# Patient Record
Sex: FEMALE | Race: WHITE | NOT HISPANIC OR LATINO
[De-identification: names, ages, dates, MRNs, and addresses within clinical notes are randomized per-mention and may not be internally consistent; named-entity substitution may affect disease eponyms.]

---

## 2017-01-04 ENCOUNTER — APPOINTMENT (OUTPATIENT)
Dept: PAIN MANAGEMENT | Facility: CLINIC | Age: 62
End: 2017-01-04

## 2017-01-04 VITALS
DIASTOLIC BLOOD PRESSURE: 77 MMHG | HEIGHT: 64 IN | HEART RATE: 78 BPM | SYSTOLIC BLOOD PRESSURE: 123 MMHG | BODY MASS INDEX: 16.9 KG/M2 | WEIGHT: 99 LBS

## 2017-01-04 DIAGNOSIS — Z12.4 ENCOUNTER FOR SCREENING FOR MALIGNANT NEOPLASM OF CERVIX: ICD-10-CM

## 2017-02-07 ENCOUNTER — APPOINTMENT (OUTPATIENT)
Dept: PAIN MANAGEMENT | Facility: CLINIC | Age: 62
End: 2017-02-07

## 2017-02-07 VITALS
HEART RATE: 92 BPM | WEIGHT: 98 LBS | SYSTOLIC BLOOD PRESSURE: 131 MMHG | DIASTOLIC BLOOD PRESSURE: 83 MMHG | HEIGHT: 64 IN | BODY MASS INDEX: 16.73 KG/M2

## 2017-02-15 ENCOUNTER — FORM ENCOUNTER (OUTPATIENT)
Age: 62
End: 2017-02-15

## 2017-02-16 ENCOUNTER — APPOINTMENT (OUTPATIENT)
Dept: MAMMOGRAPHY | Facility: IMAGING CENTER | Age: 62
End: 2017-02-16

## 2017-02-16 ENCOUNTER — APPOINTMENT (OUTPATIENT)
Dept: ULTRASOUND IMAGING | Facility: IMAGING CENTER | Age: 62
End: 2017-02-16

## 2017-02-16 ENCOUNTER — OUTPATIENT (OUTPATIENT)
Dept: OUTPATIENT SERVICES | Facility: HOSPITAL | Age: 62
LOS: 1 days | End: 2017-02-16
Payer: COMMERCIAL

## 2017-02-16 DIAGNOSIS — Z00.8 ENCOUNTER FOR OTHER GENERAL EXAMINATION: ICD-10-CM

## 2017-02-16 PROCEDURE — 76641 ULTRASOUND BREAST COMPLETE: CPT

## 2017-02-16 PROCEDURE — 77066 DX MAMMO INCL CAD BI: CPT

## 2017-02-16 PROCEDURE — G0279: CPT

## 2017-02-22 DIAGNOSIS — Z85.3 PERSONAL HISTORY OF MALIGNANT NEOPLASM OF BREAST: ICD-10-CM

## 2017-02-22 DIAGNOSIS — R92.1 MAMMOGRAPHIC CALCIFICATION FOUND ON DIAGNOSTIC IMAGING OF BREAST: ICD-10-CM

## 2017-02-22 DIAGNOSIS — R92.8 OTHER ABNORMAL AND INCONCLUSIVE FINDINGS ON DIAGNOSTIC IMAGING OF BREAST: ICD-10-CM

## 2017-02-22 DIAGNOSIS — R92.2 INCONCLUSIVE MAMMOGRAM: ICD-10-CM

## 2017-02-23 ENCOUNTER — APPOINTMENT (OUTPATIENT)
Dept: INTERNAL MEDICINE | Facility: CLINIC | Age: 62
End: 2017-02-23

## 2017-02-23 VITALS
DIASTOLIC BLOOD PRESSURE: 60 MMHG | WEIGHT: 95 LBS | TEMPERATURE: 98 F | SYSTOLIC BLOOD PRESSURE: 100 MMHG | HEART RATE: 73 BPM | HEIGHT: 64 IN | BODY MASS INDEX: 16.22 KG/M2 | OXYGEN SATURATION: 98 %

## 2017-02-24 LAB
CHOLEST SERPL-MCNC: 199 MG/DL
CHOLEST/HDLC SERPL: 3.4 RATIO
HDLC SERPL-MCNC: 58 MG/DL
LDLC SERPL CALC-MCNC: 122 MG/DL
TRIGL SERPL-MCNC: 94 MG/DL

## 2017-03-03 ENCOUNTER — NON-APPOINTMENT (OUTPATIENT)
Age: 62
End: 2017-03-03

## 2017-03-03 ENCOUNTER — APPOINTMENT (OUTPATIENT)
Dept: CARDIOLOGY | Facility: CLINIC | Age: 62
End: 2017-03-03

## 2017-03-03 ENCOUNTER — OUTPATIENT (OUTPATIENT)
Dept: OUTPATIENT SERVICES | Facility: HOSPITAL | Age: 62
LOS: 1 days | Discharge: HOME | End: 2017-03-03

## 2017-03-03 ENCOUNTER — APPOINTMENT (OUTPATIENT)
Dept: CV DIAGNOSITCS | Facility: HOSPITAL | Age: 62
End: 2017-03-03

## 2017-03-03 ENCOUNTER — OUTPATIENT (OUTPATIENT)
Dept: OUTPATIENT SERVICES | Facility: HOSPITAL | Age: 62
LOS: 1 days | End: 2017-03-03
Payer: MEDICARE

## 2017-03-03 VITALS — SYSTOLIC BLOOD PRESSURE: 118 MMHG | DIASTOLIC BLOOD PRESSURE: 81 MMHG | HEART RATE: 97 BPM | OXYGEN SATURATION: 98 %

## 2017-03-03 VITALS
BODY MASS INDEX: 16.05 KG/M2 | RESPIRATION RATE: 12 BRPM | DIASTOLIC BLOOD PRESSURE: 72 MMHG | SYSTOLIC BLOOD PRESSURE: 118 MMHG | OXYGEN SATURATION: 97 % | WEIGHT: 94 LBS | HEIGHT: 64 IN | HEART RATE: 86 BPM

## 2017-03-03 VITALS — HEART RATE: 88 BPM | OXYGEN SATURATION: 99 % | DIASTOLIC BLOOD PRESSURE: 74 MMHG | SYSTOLIC BLOOD PRESSURE: 120 MMHG

## 2017-03-03 VITALS — SYSTOLIC BLOOD PRESSURE: 123 MMHG | DIASTOLIC BLOOD PRESSURE: 72 MMHG | OXYGEN SATURATION: 97 % | HEART RATE: 88 BPM

## 2017-03-03 DIAGNOSIS — Z01.810 ENCOUNTER FOR PREPROCEDURAL CARDIOVASCULAR EXAMINATION: ICD-10-CM

## 2017-03-03 DIAGNOSIS — I42.9 CARDIOMYOPATHY, UNSPECIFIED: ICD-10-CM

## 2017-03-03 DIAGNOSIS — I34.0 NONRHEUMATIC MITRAL (VALVE) INSUFFICIENCY: ICD-10-CM

## 2017-03-03 DIAGNOSIS — D64.9 ANEMIA, UNSPECIFIED: ICD-10-CM

## 2017-03-03 PROCEDURE — 93306 TTE W/DOPPLER COMPLETE: CPT | Mod: 26

## 2017-03-07 ENCOUNTER — OUTPATIENT (OUTPATIENT)
Dept: OUTPATIENT SERVICES | Facility: HOSPITAL | Age: 62
LOS: 1 days | End: 2017-03-07

## 2017-03-07 ENCOUNTER — APPOINTMENT (OUTPATIENT)
Dept: OBGYN | Facility: CLINIC | Age: 62
End: 2017-03-07

## 2017-03-07 ENCOUNTER — RESULT REVIEW (OUTPATIENT)
Age: 62
End: 2017-03-07

## 2017-03-07 ENCOUNTER — APPOINTMENT (OUTPATIENT)
Dept: CV DIAGNOSTICS | Facility: HOSPITAL | Age: 62
End: 2017-03-07

## 2017-03-07 DIAGNOSIS — R07.89 OTHER CHEST PAIN: ICD-10-CM

## 2017-03-10 ENCOUNTER — APPOINTMENT (OUTPATIENT)
Dept: PAIN MANAGEMENT | Facility: CLINIC | Age: 62
End: 2017-03-10

## 2017-03-10 ENCOUNTER — APPOINTMENT (OUTPATIENT)
Dept: NEUROLOGY | Facility: CLINIC | Age: 62
End: 2017-03-10

## 2017-03-10 VITALS
DIASTOLIC BLOOD PRESSURE: 70 MMHG | HEIGHT: 64 IN | WEIGHT: 94 LBS | HEART RATE: 80 BPM | SYSTOLIC BLOOD PRESSURE: 104 MMHG | BODY MASS INDEX: 16.05 KG/M2

## 2017-03-10 RX ORDER — MORPHINE SULFATE 15 MG/1
15 TABLET, FILM COATED, EXTENDED RELEASE ORAL
Qty: 30 | Refills: 0 | Status: DISCONTINUED | COMMUNITY
Start: 2017-02-07 | End: 2017-03-10

## 2017-03-16 ENCOUNTER — RESULT REVIEW (OUTPATIENT)
Age: 62
End: 2017-03-16

## 2017-03-16 ENCOUNTER — FORM ENCOUNTER (OUTPATIENT)
Age: 62
End: 2017-03-16

## 2017-03-17 ENCOUNTER — OUTPATIENT (OUTPATIENT)
Dept: OUTPATIENT SERVICES | Facility: HOSPITAL | Age: 62
LOS: 1 days | End: 2017-03-17
Payer: COMMERCIAL

## 2017-03-17 ENCOUNTER — APPOINTMENT (OUTPATIENT)
Dept: ULTRASOUND IMAGING | Facility: IMAGING CENTER | Age: 62
End: 2017-03-17

## 2017-03-17 DIAGNOSIS — Z00.8 ENCOUNTER FOR OTHER GENERAL EXAMINATION: ICD-10-CM

## 2017-03-17 PROCEDURE — 88341 IMHCHEM/IMCYTCHM EA ADD ANTB: CPT

## 2017-03-17 PROCEDURE — A4648: CPT

## 2017-03-17 PROCEDURE — 77065 DX MAMMO INCL CAD UNI: CPT

## 2017-03-17 PROCEDURE — 19083 BX BREAST 1ST LESION US IMAG: CPT

## 2017-03-17 PROCEDURE — 88342 IMHCHEM/IMCYTCHM 1ST ANTB: CPT

## 2017-03-17 PROCEDURE — 88305 TISSUE EXAM BY PATHOLOGIST: CPT

## 2017-03-17 PROCEDURE — 88360 TUMOR IMMUNOHISTOCHEM/MANUAL: CPT

## 2017-03-21 LAB — SURGICAL PATHOLOGY STUDY: SIGNIFICANT CHANGE UP

## 2017-03-28 ENCOUNTER — RESULT REVIEW (OUTPATIENT)
Age: 62
End: 2017-03-28

## 2017-03-29 ENCOUNTER — APPOINTMENT (OUTPATIENT)
Dept: HEMATOLOGY ONCOLOGY | Facility: CLINIC | Age: 62
End: 2017-03-29

## 2017-03-29 VITALS
SYSTOLIC BLOOD PRESSURE: 116 MMHG | WEIGHT: 97.44 LBS | RESPIRATION RATE: 16 BRPM | TEMPERATURE: 97.6 F | HEART RATE: 88 BPM | OXYGEN SATURATION: 97 % | BODY MASS INDEX: 16.73 KG/M2 | DIASTOLIC BLOOD PRESSURE: 80 MMHG

## 2017-03-29 LAB
BASOPHILS # BLD AUTO: 0 K/UL — SIGNIFICANT CHANGE UP (ref 0–0.2)
BASOPHILS NFR BLD AUTO: 0.4 % — SIGNIFICANT CHANGE UP (ref 0–2)
EOSINOPHIL # BLD AUTO: 0.1 K/UL — SIGNIFICANT CHANGE UP (ref 0–0.5)
EOSINOPHIL NFR BLD AUTO: 1.4 % — SIGNIFICANT CHANGE UP (ref 0–6)
HCT VFR BLD CALC: 36.8 % — SIGNIFICANT CHANGE UP (ref 34.5–45)
HGB BLD-MCNC: 12.8 G/DL — SIGNIFICANT CHANGE UP (ref 11.5–15.5)
LYMPHOCYTES # BLD AUTO: 2 K/UL — SIGNIFICANT CHANGE UP (ref 1–3.3)
LYMPHOCYTES # BLD AUTO: 23.6 % — SIGNIFICANT CHANGE UP (ref 13–44)
MCHC RBC-ENTMCNC: 34.7 G/DL — SIGNIFICANT CHANGE UP (ref 32–36)
MCHC RBC-ENTMCNC: 34.8 PG — HIGH (ref 27–34)
MCV RBC AUTO: 100 FL — SIGNIFICANT CHANGE UP (ref 80–100)
MONOCYTES # BLD AUTO: 0.6 K/UL — SIGNIFICANT CHANGE UP (ref 0–0.9)
MONOCYTES NFR BLD AUTO: 7.5 % — SIGNIFICANT CHANGE UP (ref 2–14)
NEUTROPHILS # BLD AUTO: 5.6 K/UL — SIGNIFICANT CHANGE UP (ref 1.8–7.4)
NEUTROPHILS NFR BLD AUTO: 67.1 % — SIGNIFICANT CHANGE UP (ref 43–77)
PLATELET # BLD AUTO: 239 K/UL — SIGNIFICANT CHANGE UP (ref 150–400)
RBC # BLD: 3.67 M/UL — LOW (ref 3.8–5.2)
RBC # FLD: 11.5 % — SIGNIFICANT CHANGE UP (ref 10.3–14.5)
WBC # BLD: 8.3 K/UL — SIGNIFICANT CHANGE UP (ref 3.8–10.5)
WBC # FLD AUTO: 8.3 K/UL — SIGNIFICANT CHANGE UP (ref 3.8–10.5)

## 2017-04-11 ENCOUNTER — APPOINTMENT (OUTPATIENT)
Dept: PAIN MANAGEMENT | Facility: CLINIC | Age: 62
End: 2017-04-11

## 2017-04-11 VITALS
HEIGHT: 64 IN | SYSTOLIC BLOOD PRESSURE: 122 MMHG | WEIGHT: 97 LBS | HEART RATE: 86 BPM | BODY MASS INDEX: 16.56 KG/M2 | DIASTOLIC BLOOD PRESSURE: 77 MMHG

## 2017-04-20 ENCOUNTER — RX RENEWAL (OUTPATIENT)
Age: 62
End: 2017-04-20

## 2017-05-10 ENCOUNTER — APPOINTMENT (OUTPATIENT)
Dept: PAIN MANAGEMENT | Facility: CLINIC | Age: 62
End: 2017-05-10

## 2017-05-10 VITALS
WEIGHT: 97 LBS | SYSTOLIC BLOOD PRESSURE: 124 MMHG | HEART RATE: 79 BPM | HEIGHT: 64 IN | DIASTOLIC BLOOD PRESSURE: 82 MMHG | BODY MASS INDEX: 16.56 KG/M2

## 2017-05-30 ENCOUNTER — APPOINTMENT (OUTPATIENT)
Dept: NEUROLOGY | Facility: CLINIC | Age: 62
End: 2017-05-30

## 2017-06-01 ENCOUNTER — RESULT REVIEW (OUTPATIENT)
Age: 62
End: 2017-06-01

## 2017-06-01 LAB
ALBUMIN SERPL ELPH-MCNC: 4.3 G/DL
ALP BLD-CCNC: 87 U/L
ALT SERPL-CCNC: 11 U/L
ANION GAP SERPL CALC-SCNC: 22 MMOL/L
AST SERPL-CCNC: 15 U/L
BILIRUB SERPL-MCNC: 0.2 MG/DL
BUN SERPL-MCNC: 10 MG/DL
CALCIUM SERPL-MCNC: 9.5 MG/DL
CANCER AG27-29 SERPL-ACNC: 46.4 U/ML
CHLORIDE SERPL-SCNC: 102 MMOL/L
CO2 SERPL-SCNC: 20 MMOL/L
CREAT SERPL-MCNC: 0.64 MG/DL
GLUCOSE SERPL-MCNC: 94 MG/DL
LDH SERPL-CCNC: 211 U/L
POTASSIUM SERPL-SCNC: 4.7 MMOL/L
PROT SERPL-MCNC: 7.3 G/DL
SODIUM SERPL-SCNC: 144 MMOL/L

## 2017-06-05 ENCOUNTER — APPOINTMENT (OUTPATIENT)
Dept: PAIN MANAGEMENT | Facility: CLINIC | Age: 62
End: 2017-06-05

## 2017-06-05 VITALS
DIASTOLIC BLOOD PRESSURE: 94 MMHG | WEIGHT: 97 LBS | HEART RATE: 84 BPM | BODY MASS INDEX: 16.56 KG/M2 | HEIGHT: 64 IN | SYSTOLIC BLOOD PRESSURE: 143 MMHG

## 2017-06-05 RX ORDER — HYDROCODONE BITARTRATE 10 MG/1
10 CAPSULE, EXTENDED RELEASE ORAL
Qty: 30 | Refills: 0 | Status: DISCONTINUED | COMMUNITY
Start: 2017-03-10 | End: 2017-06-05

## 2017-06-12 ENCOUNTER — MEDICATION RENEWAL (OUTPATIENT)
Age: 62
End: 2017-06-12

## 2017-06-23 ENCOUNTER — APPOINTMENT (OUTPATIENT)
Dept: INTERNAL MEDICINE | Facility: CLINIC | Age: 62
End: 2017-06-23

## 2017-06-23 VITALS
HEIGHT: 64 IN | HEART RATE: 82 BPM | BODY MASS INDEX: 15.88 KG/M2 | SYSTOLIC BLOOD PRESSURE: 140 MMHG | WEIGHT: 93 LBS | DIASTOLIC BLOOD PRESSURE: 80 MMHG

## 2017-06-23 DIAGNOSIS — Z86.69 PERSONAL HISTORY OF OTHER DISEASES OF THE NERVOUS SYSTEM AND SENSE ORGANS: ICD-10-CM

## 2017-06-24 LAB — TSH SERPL-ACNC: 0.85 UIU/ML

## 2017-06-27 ENCOUNTER — APPOINTMENT (OUTPATIENT)
Dept: OBGYN | Facility: CLINIC | Age: 62
End: 2017-06-27

## 2017-06-27 VITALS
WEIGHT: 95 LBS | HEIGHT: 64 IN | SYSTOLIC BLOOD PRESSURE: 158 MMHG | DIASTOLIC BLOOD PRESSURE: 90 MMHG | BODY MASS INDEX: 16.22 KG/M2 | HEART RATE: 85 BPM

## 2017-06-27 DIAGNOSIS — Z01.419 ENCOUNTER FOR GYNECOLOGICAL EXAMINATION (GENERAL) (ROUTINE) W/OUT ABNORMAL FINDINGS: ICD-10-CM

## 2017-06-28 LAB — HPV HIGH+LOW RISK DNA PNL CVX: NEGATIVE

## 2017-07-02 LAB — CYTOLOGY CVX/VAG DOC THIN PREP: NORMAL

## 2017-07-05 ENCOUNTER — APPOINTMENT (OUTPATIENT)
Dept: PAIN MANAGEMENT | Facility: CLINIC | Age: 62
End: 2017-07-05

## 2017-07-05 VITALS
DIASTOLIC BLOOD PRESSURE: 76 MMHG | HEIGHT: 64 IN | WEIGHT: 95 LBS | HEART RATE: 82 BPM | BODY MASS INDEX: 16.22 KG/M2 | SYSTOLIC BLOOD PRESSURE: 130 MMHG

## 2017-07-07 ENCOUNTER — NON-APPOINTMENT (OUTPATIENT)
Age: 62
End: 2017-07-07

## 2017-07-07 ENCOUNTER — APPOINTMENT (OUTPATIENT)
Dept: CARDIOLOGY | Facility: CLINIC | Age: 62
End: 2017-07-07

## 2017-07-07 VITALS — OXYGEN SATURATION: 98 % | DIASTOLIC BLOOD PRESSURE: 69 MMHG | HEART RATE: 77 BPM | SYSTOLIC BLOOD PRESSURE: 121 MMHG

## 2017-07-07 VITALS — HEART RATE: 79 BPM | DIASTOLIC BLOOD PRESSURE: 74 MMHG | SYSTOLIC BLOOD PRESSURE: 124 MMHG | OXYGEN SATURATION: 100 %

## 2017-07-07 VITALS — OXYGEN SATURATION: 98 % | DIASTOLIC BLOOD PRESSURE: 76 MMHG | HEART RATE: 80 BPM | SYSTOLIC BLOOD PRESSURE: 152 MMHG

## 2017-07-07 VITALS
DIASTOLIC BLOOD PRESSURE: 66 MMHG | RESPIRATION RATE: 12 BRPM | OXYGEN SATURATION: 100 % | BODY MASS INDEX: 15.71 KG/M2 | HEIGHT: 64 IN | HEART RATE: 82 BPM | SYSTOLIC BLOOD PRESSURE: 108 MMHG | WEIGHT: 92 LBS

## 2017-07-07 VITALS — SYSTOLIC BLOOD PRESSURE: 128 MMHG | OXYGEN SATURATION: 96 % | DIASTOLIC BLOOD PRESSURE: 82 MMHG | HEART RATE: 86 BPM

## 2017-07-07 DIAGNOSIS — Z86.79 PERSONAL HISTORY OF OTHER DISEASES OF THE CIRCULATORY SYSTEM: ICD-10-CM

## 2017-07-10 ENCOUNTER — MEDICATION RENEWAL (OUTPATIENT)
Age: 62
End: 2017-07-10

## 2017-08-02 ENCOUNTER — APPOINTMENT (OUTPATIENT)
Dept: PAIN MANAGEMENT | Facility: CLINIC | Age: 62
End: 2017-08-02
Payer: COMMERCIAL

## 2017-08-02 VITALS
BODY MASS INDEX: 15.88 KG/M2 | SYSTOLIC BLOOD PRESSURE: 127 MMHG | HEART RATE: 75 BPM | HEIGHT: 64 IN | DIASTOLIC BLOOD PRESSURE: 81 MMHG | WEIGHT: 93 LBS

## 2017-08-02 PROCEDURE — 99213 OFFICE O/P EST LOW 20 MIN: CPT

## 2017-08-17 ENCOUNTER — APPOINTMENT (OUTPATIENT)
Dept: INTERNAL MEDICINE | Facility: CLINIC | Age: 62
End: 2017-08-17
Payer: MEDICARE

## 2017-08-17 VITALS
HEIGHT: 64 IN | TEMPERATURE: 97.8 F | OXYGEN SATURATION: 96 % | SYSTOLIC BLOOD PRESSURE: 90 MMHG | WEIGHT: 92 LBS | DIASTOLIC BLOOD PRESSURE: 60 MMHG | HEART RATE: 78 BPM | BODY MASS INDEX: 15.71 KG/M2

## 2017-08-17 DIAGNOSIS — Z80.0 FAMILY HISTORY OF MALIGNANT NEOPLASM OF DIGESTIVE ORGANS: ICD-10-CM

## 2017-08-17 DIAGNOSIS — Z84.89 FAMILY HISTORY OF OTHER SPECIFIED CONDITIONS: ICD-10-CM

## 2017-08-17 DIAGNOSIS — Z82.5 FAMILY HISTORY OF ASTHMA AND OTHER CHRONIC LOWER RESPIRATORY DISEASES: ICD-10-CM

## 2017-08-17 LAB
ALBUMIN SERPL ELPH-MCNC: 4 G/DL
ALP BLD-CCNC: 80 U/L
ALT SERPL-CCNC: 17 U/L
ANION GAP SERPL CALC-SCNC: 17 MMOL/L
AST SERPL-CCNC: 17 U/L
BILIRUB SERPL-MCNC: 0.4 MG/DL
BUN SERPL-MCNC: 11 MG/DL
CALCIUM SERPL-MCNC: 9.7 MG/DL
CHLORIDE SERPL-SCNC: 104 MMOL/L
CHOLEST SERPL-MCNC: 201 MG/DL
CHOLEST/HDLC SERPL: 3.4 RATIO
CO2 SERPL-SCNC: 23 MMOL/L
CREAT SERPL-MCNC: 0.75 MG/DL
GLUCOSE SERPL-MCNC: 84 MG/DL
HDLC SERPL-MCNC: 60 MG/DL
LDLC SERPL CALC-MCNC: 122 MG/DL
POTASSIUM SERPL-SCNC: 5.4 MMOL/L
PROT SERPL-MCNC: 7.4 G/DL
SODIUM SERPL-SCNC: 144 MMOL/L
TRIGL SERPL-MCNC: 95 MG/DL

## 2017-08-17 PROCEDURE — 36415 COLL VENOUS BLD VENIPUNCTURE: CPT

## 2017-08-17 PROCEDURE — 90471 IMMUNIZATION ADMIN: CPT

## 2017-08-17 PROCEDURE — 99396 PREV VISIT EST AGE 40-64: CPT | Mod: 25

## 2017-08-17 PROCEDURE — 90736 HZV VACCINE LIVE SUBQ: CPT

## 2017-08-18 LAB
25(OH)D3 SERPL-MCNC: 64.3 NG/ML
BASOPHILS # BLD AUTO: 0.04 K/UL
BASOPHILS NFR BLD AUTO: 0.5 %
EOSINOPHIL # BLD AUTO: 0.14 K/UL
EOSINOPHIL NFR BLD AUTO: 1.7 %
HBA1C MFR BLD HPLC: 5 %
HCT VFR BLD CALC: 39.8 %
HGB BLD-MCNC: 12.5 G/DL
IMM GRANULOCYTES NFR BLD AUTO: 0.1 %
LYMPHOCYTES # BLD AUTO: 2.44 K/UL
LYMPHOCYTES NFR BLD AUTO: 30 %
MAN DIFF?: NORMAL
MCHC RBC-ENTMCNC: 31.4 GM/DL
MCHC RBC-ENTMCNC: 31.6 PG
MCV RBC AUTO: 100.5 FL
MONOCYTES # BLD AUTO: 0.5 K/UL
MONOCYTES NFR BLD AUTO: 6.2 %
NEUTROPHILS # BLD AUTO: 5 K/UL
NEUTROPHILS NFR BLD AUTO: 61.5 %
PLATELET # BLD AUTO: 242 K/UL
RBC # BLD: 3.96 M/UL
RBC # FLD: 13.5 %
WBC # FLD AUTO: 8.13 K/UL

## 2017-08-29 ENCOUNTER — APPOINTMENT (OUTPATIENT)
Dept: NEUROLOGY | Facility: CLINIC | Age: 62
End: 2017-08-29
Payer: MEDICARE

## 2017-08-29 VITALS
BODY MASS INDEX: 15.71 KG/M2 | HEIGHT: 64 IN | HEART RATE: 70 BPM | SYSTOLIC BLOOD PRESSURE: 142 MMHG | DIASTOLIC BLOOD PRESSURE: 83 MMHG | WEIGHT: 92 LBS

## 2017-08-29 PROCEDURE — 99215 OFFICE O/P EST HI 40 MIN: CPT

## 2017-09-05 ENCOUNTER — APPOINTMENT (OUTPATIENT)
Dept: PAIN MANAGEMENT | Facility: CLINIC | Age: 62
End: 2017-09-05
Payer: MEDICARE

## 2017-09-05 PROCEDURE — 99213 OFFICE O/P EST LOW 20 MIN: CPT

## 2017-09-26 ENCOUNTER — APPOINTMENT (OUTPATIENT)
Dept: GASTROENTEROLOGY | Facility: CLINIC | Age: 62
End: 2017-09-26
Payer: MEDICARE

## 2017-09-26 PROCEDURE — 99215 OFFICE O/P EST HI 40 MIN: CPT

## 2017-09-26 PROCEDURE — 82274 ASSAY TEST FOR BLOOD FECAL: CPT | Mod: QW

## 2017-09-28 ENCOUNTER — OTHER (OUTPATIENT)
Age: 62
End: 2017-09-28

## 2017-09-29 ENCOUNTER — OTHER (OUTPATIENT)
Age: 62
End: 2017-09-29

## 2017-10-03 ENCOUNTER — MEDICATION RENEWAL (OUTPATIENT)
Age: 62
End: 2017-10-03

## 2017-10-12 ENCOUNTER — OUTPATIENT (OUTPATIENT)
Dept: OUTPATIENT SERVICES | Facility: HOSPITAL | Age: 62
LOS: 1 days | Discharge: HOME | End: 2017-10-12

## 2017-10-12 DIAGNOSIS — D64.9 ANEMIA, UNSPECIFIED: ICD-10-CM

## 2017-10-16 ENCOUNTER — FORM ENCOUNTER (OUTPATIENT)
Age: 62
End: 2017-10-16

## 2017-10-17 ENCOUNTER — APPOINTMENT (OUTPATIENT)
Dept: ULTRASOUND IMAGING | Facility: IMAGING CENTER | Age: 62
End: 2017-10-17
Payer: MEDICARE

## 2017-10-17 ENCOUNTER — OUTPATIENT (OUTPATIENT)
Dept: OUTPATIENT SERVICES | Facility: HOSPITAL | Age: 62
LOS: 1 days | End: 2017-10-17
Payer: COMMERCIAL

## 2017-10-17 ENCOUNTER — APPOINTMENT (OUTPATIENT)
Dept: HEMATOLOGY ONCOLOGY | Facility: CLINIC | Age: 62
End: 2017-10-17
Payer: MEDICARE

## 2017-10-17 ENCOUNTER — APPOINTMENT (OUTPATIENT)
Dept: RADIOLOGY | Facility: IMAGING CENTER | Age: 62
End: 2017-10-17
Payer: MEDICARE

## 2017-10-17 VITALS
TEMPERATURE: 97.4 F | BODY MASS INDEX: 15.7 KG/M2 | RESPIRATION RATE: 12 BRPM | WEIGHT: 91.49 LBS | OXYGEN SATURATION: 97 % | SYSTOLIC BLOOD PRESSURE: 150 MMHG | HEART RATE: 76 BPM | DIASTOLIC BLOOD PRESSURE: 86 MMHG

## 2017-10-17 DIAGNOSIS — Z00.8 ENCOUNTER FOR OTHER GENERAL EXAMINATION: ICD-10-CM

## 2017-10-17 PROCEDURE — 76642 ULTRASOUND BREAST LIMITED: CPT | Mod: 26,LT

## 2017-10-17 PROCEDURE — 99213 OFFICE O/P EST LOW 20 MIN: CPT

## 2017-10-17 PROCEDURE — 77080 DXA BONE DENSITY AXIAL: CPT | Mod: 26

## 2017-10-17 PROCEDURE — 76642 ULTRASOUND BREAST LIMITED: CPT

## 2017-10-17 PROCEDURE — 77080 DXA BONE DENSITY AXIAL: CPT

## 2017-10-24 ENCOUNTER — LABORATORY RESULT (OUTPATIENT)
Age: 62
End: 2017-10-24

## 2017-10-24 ENCOUNTER — APPOINTMENT (OUTPATIENT)
Dept: ENDOCRINOLOGY | Facility: CLINIC | Age: 62
End: 2017-10-24
Payer: MEDICARE

## 2017-10-24 VITALS
SYSTOLIC BLOOD PRESSURE: 110 MMHG | OXYGEN SATURATION: 98 % | HEART RATE: 76 BPM | BODY MASS INDEX: 15.54 KG/M2 | HEIGHT: 64 IN | DIASTOLIC BLOOD PRESSURE: 70 MMHG | WEIGHT: 91 LBS

## 2017-10-24 PROCEDURE — 99202 OFFICE O/P NEW SF 15 MIN: CPT

## 2017-10-26 LAB
CALCIUM SERPL-MCNC: 9.3 MG/DL
CALCIUM SERPL-MCNC: 9.3 MG/DL
GLIADIN IGA SER QL: 6.4 UNITS
GLIADIN IGG SER QL: <5 UNITS
GLIADIN PEPTIDE IGA SER-ACNC: NEGATIVE
GLIADIN PEPTIDE IGG SER-ACNC: NEGATIVE
PARATHYROID HORMONE INTACT: 77 PG/ML
PHOSPHATE SERPL-MCNC: 4.3 MG/DL
T3FREE SERPL-MCNC: 2.81 PG/ML
TSH SERPL-ACNC: 0.81 UIU/ML
TTG IGA SER IA-ACNC: <5 UNITS
TTG IGA SER-ACNC: NEGATIVE
TTG IGG SER IA-ACNC: <5 UNITS
TTG IGG SER IA-ACNC: NEGATIVE

## 2017-10-30 ENCOUNTER — RX RENEWAL (OUTPATIENT)
Age: 62
End: 2017-10-30

## 2017-11-07 ENCOUNTER — APPOINTMENT (OUTPATIENT)
Dept: PAIN MANAGEMENT | Facility: CLINIC | Age: 62
End: 2017-11-07
Payer: MEDICARE

## 2017-11-07 VITALS
HEART RATE: 79 BPM | SYSTOLIC BLOOD PRESSURE: 117 MMHG | BODY MASS INDEX: 15.71 KG/M2 | DIASTOLIC BLOOD PRESSURE: 77 MMHG | WEIGHT: 92 LBS | HEIGHT: 64 IN

## 2017-11-07 PROCEDURE — 99213 OFFICE O/P EST LOW 20 MIN: CPT

## 2017-11-14 ENCOUNTER — APPOINTMENT (OUTPATIENT)
Dept: SURGICAL ONCOLOGY | Facility: CLINIC | Age: 62
End: 2017-11-14
Payer: MEDICARE

## 2017-11-14 VITALS
RESPIRATION RATE: 16 BRPM | DIASTOLIC BLOOD PRESSURE: 71 MMHG | BODY MASS INDEX: 15.71 KG/M2 | SYSTOLIC BLOOD PRESSURE: 109 MMHG | HEIGHT: 64 IN | WEIGHT: 92 LBS | HEART RATE: 74 BPM | OXYGEN SATURATION: 99 %

## 2017-11-14 PROCEDURE — 99215 OFFICE O/P EST HI 40 MIN: CPT

## 2017-11-15 ENCOUNTER — APPOINTMENT (OUTPATIENT)
Dept: ENDOCRINOLOGY | Facility: CLINIC | Age: 62
End: 2017-11-15

## 2017-11-17 ENCOUNTER — APPOINTMENT (OUTPATIENT)
Dept: INTERNAL MEDICINE | Facility: CLINIC | Age: 62
End: 2017-11-17
Payer: MEDICARE

## 2017-11-17 VITALS
DIASTOLIC BLOOD PRESSURE: 70 MMHG | TEMPERATURE: 98.1 F | OXYGEN SATURATION: 98 % | SYSTOLIC BLOOD PRESSURE: 100 MMHG | HEART RATE: 88 BPM | HEIGHT: 64 IN | WEIGHT: 92 LBS | BODY MASS INDEX: 15.71 KG/M2

## 2017-11-17 PROCEDURE — G0008: CPT

## 2017-11-17 PROCEDURE — 90688 IIV4 VACCINE SPLT 0.5 ML IM: CPT

## 2017-11-17 PROCEDURE — 99213 OFFICE O/P EST LOW 20 MIN: CPT | Mod: 25

## 2017-11-27 ENCOUNTER — APPOINTMENT (OUTPATIENT)
Dept: GASTROENTEROLOGY | Facility: CLINIC | Age: 62
End: 2017-11-27
Payer: MEDICARE

## 2017-11-27 PROCEDURE — 43239 EGD BIOPSY SINGLE/MULTIPLE: CPT | Mod: 59

## 2017-11-27 PROCEDURE — 45380 COLONOSCOPY AND BIOPSY: CPT

## 2017-11-28 ENCOUNTER — APPOINTMENT (OUTPATIENT)
Dept: NEUROLOGY | Facility: CLINIC | Age: 62
End: 2017-11-28
Payer: MEDICARE

## 2017-11-28 VITALS
SYSTOLIC BLOOD PRESSURE: 124 MMHG | HEART RATE: 92 BPM | BODY MASS INDEX: 15.71 KG/M2 | DIASTOLIC BLOOD PRESSURE: 86 MMHG | WEIGHT: 92 LBS | HEIGHT: 64 IN

## 2017-11-28 PROCEDURE — 99215 OFFICE O/P EST HI 40 MIN: CPT

## 2017-12-01 ENCOUNTER — APPOINTMENT (OUTPATIENT)
Dept: PAIN MANAGEMENT | Facility: CLINIC | Age: 62
End: 2017-12-01
Payer: MEDICARE

## 2017-12-01 VITALS
WEIGHT: 92 LBS | HEART RATE: 80 BPM | BODY MASS INDEX: 15.71 KG/M2 | HEIGHT: 64 IN | DIASTOLIC BLOOD PRESSURE: 73 MMHG | SYSTOLIC BLOOD PRESSURE: 125 MMHG

## 2017-12-01 PROCEDURE — 99213 OFFICE O/P EST LOW 20 MIN: CPT

## 2017-12-07 DIAGNOSIS — Z83.79 FAMILY HISTORY OF OTHER DISEASES OF THE DIGESTIVE SYSTEM: ICD-10-CM

## 2017-12-07 DIAGNOSIS — Z92.21 PERSONAL HISTORY OF ANTINEOPLASTIC CHEMOTHERAPY: ICD-10-CM

## 2017-12-07 DIAGNOSIS — Z80.0 FAMILY HISTORY OF MALIGNANT NEOPLASM OF DIGESTIVE ORGANS: ICD-10-CM

## 2017-12-13 PROBLEM — Z92.21 HISTORY OF CHEMOTHERAPY: Status: RESOLVED | Noted: 2017-12-13 | Resolved: 2017-12-13

## 2017-12-13 PROBLEM — Z83.79 FAMILY HISTORY OF INFLAMMATORY BOWEL DISEASE: Status: ACTIVE | Noted: 2017-12-13

## 2017-12-13 PROBLEM — Z83.79 FAMILY HISTORY OF COLITIS: Status: ACTIVE | Noted: 2017-12-13

## 2017-12-13 PROBLEM — Z80.0 FAMILY HISTORY OF STOMACH CANCER: Status: ACTIVE | Noted: 2017-12-13

## 2017-12-13 PROBLEM — Z80.0 FAMILY HISTORY OF MALIGNANT NEOPLASM OF SMALL INTESTINE: Status: ACTIVE | Noted: 2017-12-13

## 2017-12-29 ENCOUNTER — RX RENEWAL (OUTPATIENT)
Age: 62
End: 2017-12-29

## 2018-01-06 ENCOUNTER — MEDICATION RENEWAL (OUTPATIENT)
Age: 63
End: 2018-01-06

## 2018-01-09 ENCOUNTER — APPOINTMENT (OUTPATIENT)
Dept: CARDIOLOGY | Facility: CLINIC | Age: 63
End: 2018-01-09
Payer: MEDICARE

## 2018-01-09 VITALS — DIASTOLIC BLOOD PRESSURE: 65 MMHG | SYSTOLIC BLOOD PRESSURE: 101 MMHG | HEART RATE: 88 BPM | OXYGEN SATURATION: 95 %

## 2018-01-09 VITALS — DIASTOLIC BLOOD PRESSURE: 73 MMHG | HEART RATE: 81 BPM | OXYGEN SATURATION: 97 % | SYSTOLIC BLOOD PRESSURE: 105 MMHG

## 2018-01-09 VITALS — DIASTOLIC BLOOD PRESSURE: 69 MMHG | SYSTOLIC BLOOD PRESSURE: 104 MMHG | OXYGEN SATURATION: 96 % | HEART RATE: 83 BPM

## 2018-01-09 VITALS
BODY MASS INDEX: 14.51 KG/M2 | DIASTOLIC BLOOD PRESSURE: 65 MMHG | WEIGHT: 85 LBS | SYSTOLIC BLOOD PRESSURE: 105 MMHG | HEART RATE: 80 BPM | OXYGEN SATURATION: 97 % | HEIGHT: 64 IN | RESPIRATION RATE: 12 BRPM

## 2018-01-09 VITALS — OXYGEN SATURATION: 95 % | DIASTOLIC BLOOD PRESSURE: 64 MMHG | HEART RATE: 90 BPM | SYSTOLIC BLOOD PRESSURE: 89 MMHG

## 2018-01-09 VITALS — OXYGEN SATURATION: 95 % | DIASTOLIC BLOOD PRESSURE: 65 MMHG | HEART RATE: 88 BPM | SYSTOLIC BLOOD PRESSURE: 96 MMHG

## 2018-01-09 VITALS — HEART RATE: 88 BPM | OXYGEN SATURATION: 95 % | DIASTOLIC BLOOD PRESSURE: 64 MMHG | SYSTOLIC BLOOD PRESSURE: 90 MMHG

## 2018-01-09 PROCEDURE — 93040 RHYTHM ECG WITH REPORT: CPT | Mod: 59

## 2018-01-09 PROCEDURE — 99215 OFFICE O/P EST HI 40 MIN: CPT

## 2018-01-09 PROCEDURE — 93000 ELECTROCARDIOGRAM COMPLETE: CPT

## 2018-01-11 ENCOUNTER — MEDICATION RENEWAL (OUTPATIENT)
Age: 63
End: 2018-01-11

## 2018-01-12 ENCOUNTER — APPOINTMENT (OUTPATIENT)
Dept: PAIN MANAGEMENT | Facility: CLINIC | Age: 63
End: 2018-01-12
Payer: MEDICARE

## 2018-01-12 VITALS
DIASTOLIC BLOOD PRESSURE: 78 MMHG | WEIGHT: 85 LBS | BODY MASS INDEX: 14.51 KG/M2 | HEIGHT: 64 IN | HEART RATE: 89 BPM | SYSTOLIC BLOOD PRESSURE: 126 MMHG

## 2018-01-12 PROCEDURE — 99213 OFFICE O/P EST LOW 20 MIN: CPT

## 2018-01-26 ENCOUNTER — APPOINTMENT (OUTPATIENT)
Dept: GASTROENTEROLOGY | Facility: CLINIC | Age: 63
End: 2018-01-26
Payer: MEDICARE

## 2018-01-26 VITALS
HEART RATE: 86 BPM | WEIGHT: 85 LBS | BODY MASS INDEX: 14.51 KG/M2 | DIASTOLIC BLOOD PRESSURE: 86 MMHG | SYSTOLIC BLOOD PRESSURE: 149 MMHG | HEIGHT: 64 IN

## 2018-01-26 PROCEDURE — 99203 OFFICE O/P NEW LOW 30 MIN: CPT

## 2018-02-05 ENCOUNTER — APPOINTMENT (OUTPATIENT)
Dept: PAIN MANAGEMENT | Facility: CLINIC | Age: 63
End: 2018-02-05

## 2018-02-07 ENCOUNTER — APPOINTMENT (OUTPATIENT)
Dept: PAIN MANAGEMENT | Facility: CLINIC | Age: 63
End: 2018-02-07
Payer: MEDICARE

## 2018-02-07 VITALS
SYSTOLIC BLOOD PRESSURE: 135 MMHG | DIASTOLIC BLOOD PRESSURE: 84 MMHG | HEIGHT: 64 IN | HEART RATE: 102 BPM | BODY MASS INDEX: 15.19 KG/M2 | WEIGHT: 89 LBS

## 2018-02-07 PROCEDURE — 99213 OFFICE O/P EST LOW 20 MIN: CPT

## 2018-02-09 ENCOUNTER — OTHER (OUTPATIENT)
Age: 63
End: 2018-02-09

## 2018-02-12 ENCOUNTER — FORM ENCOUNTER (OUTPATIENT)
Age: 63
End: 2018-02-12

## 2018-02-13 ENCOUNTER — APPOINTMENT (OUTPATIENT)
Dept: CT IMAGING | Facility: IMAGING CENTER | Age: 63
End: 2018-02-13
Payer: MEDICARE

## 2018-02-13 ENCOUNTER — OUTPATIENT (OUTPATIENT)
Dept: OUTPATIENT SERVICES | Facility: HOSPITAL | Age: 63
LOS: 1 days | End: 2018-02-13
Payer: COMMERCIAL

## 2018-02-13 DIAGNOSIS — R63.4 ABNORMAL WEIGHT LOSS: ICD-10-CM

## 2018-02-13 DIAGNOSIS — C50.919 MALIGNANT NEOPLASM OF UNSPECIFIED SITE OF UNSPECIFIED FEMALE BREAST: ICD-10-CM

## 2018-02-13 DIAGNOSIS — R19.7 DIARRHEA, UNSPECIFIED: ICD-10-CM

## 2018-02-13 DIAGNOSIS — R63.6 UNDERWEIGHT: ICD-10-CM

## 2018-02-13 PROCEDURE — 74177 CT ABD & PELVIS W/CONTRAST: CPT

## 2018-02-13 PROCEDURE — 82565 ASSAY OF CREATININE: CPT

## 2018-02-13 PROCEDURE — 74177 CT ABD & PELVIS W/CONTRAST: CPT | Mod: 26

## 2018-02-14 ENCOUNTER — MEDICATION RENEWAL (OUTPATIENT)
Age: 63
End: 2018-02-14

## 2018-02-27 ENCOUNTER — APPOINTMENT (OUTPATIENT)
Dept: NEUROLOGY | Facility: CLINIC | Age: 63
End: 2018-02-27
Payer: MEDICARE

## 2018-02-27 VITALS
SYSTOLIC BLOOD PRESSURE: 137 MMHG | HEIGHT: 64 IN | HEART RATE: 70 BPM | BODY MASS INDEX: 14.85 KG/M2 | WEIGHT: 87 LBS | DIASTOLIC BLOOD PRESSURE: 78 MMHG

## 2018-02-27 PROCEDURE — 99215 OFFICE O/P EST HI 40 MIN: CPT

## 2018-03-03 LAB — ALDOSTERONE SERUM: 3.4 NG/DL

## 2018-03-06 ENCOUNTER — MEDICATION RENEWAL (OUTPATIENT)
Age: 63
End: 2018-03-06

## 2018-03-09 ENCOUNTER — APPOINTMENT (OUTPATIENT)
Dept: INTERNAL MEDICINE | Facility: CLINIC | Age: 63
End: 2018-03-09
Payer: MEDICARE

## 2018-03-09 VITALS
SYSTOLIC BLOOD PRESSURE: 120 MMHG | BODY MASS INDEX: 14.51 KG/M2 | DIASTOLIC BLOOD PRESSURE: 60 MMHG | WEIGHT: 85 LBS | TEMPERATURE: 97.5 F | HEIGHT: 64 IN | OXYGEN SATURATION: 98 % | HEART RATE: 102 BPM

## 2018-03-09 PROCEDURE — 99213 OFFICE O/P EST LOW 20 MIN: CPT

## 2018-03-10 LAB
ALBUMIN SERPL ELPH-MCNC: 4.5 G/DL
ALP BLD-CCNC: 91 U/L
ALT SERPL-CCNC: 12 U/L
ANION GAP SERPL CALC-SCNC: 17 MMOL/L
APTT BLD: 28.3 SEC
AST SERPL-CCNC: 19 U/L
BASOPHILS # BLD AUTO: 0.04 K/UL
BASOPHILS NFR BLD AUTO: 0.4 %
BILIRUB SERPL-MCNC: 0.2 MG/DL
BUN SERPL-MCNC: 9 MG/DL
CALCIUM SERPL-MCNC: 10 MG/DL
CHLORIDE SERPL-SCNC: 103 MMOL/L
CO2 SERPL-SCNC: 24 MMOL/L
CREAT SERPL-MCNC: 0.6 MG/DL
EOSINOPHIL # BLD AUTO: 0.03 K/UL
EOSINOPHIL NFR BLD AUTO: 0.3 %
GLUCOSE SERPL-MCNC: 102 MG/DL
HCT VFR BLD CALC: 39.4 %
HGB BLD-MCNC: 13.3 G/DL
IMM GRANULOCYTES NFR BLD AUTO: 0.1 %
INR PPP: 1.03 RATIO
LYMPHOCYTES # BLD AUTO: 1.95 K/UL
LYMPHOCYTES NFR BLD AUTO: 20.5 %
MAN DIFF?: NORMAL
MCHC RBC-ENTMCNC: 33.4 PG
MCHC RBC-ENTMCNC: 33.8 GM/DL
MCV RBC AUTO: 99 FL
MONOCYTES # BLD AUTO: 0.51 K/UL
MONOCYTES NFR BLD AUTO: 5.4 %
NEUTROPHILS # BLD AUTO: 6.95 K/UL
NEUTROPHILS NFR BLD AUTO: 73.3 %
PLATELET # BLD AUTO: 234 K/UL
POTASSIUM SERPL-SCNC: 4.5 MMOL/L
PROT SERPL-MCNC: 7.6 G/DL
PT BLD: 11.6 SEC
RBC # BLD: 3.98 M/UL
RBC # FLD: 13.7 %
SODIUM SERPL-SCNC: 144 MMOL/L
WBC # FLD AUTO: 9.49 K/UL

## 2018-03-14 ENCOUNTER — APPOINTMENT (OUTPATIENT)
Dept: GASTROENTEROLOGY | Facility: CLINIC | Age: 63
End: 2018-03-14
Payer: MEDICARE

## 2018-03-14 VITALS
BODY MASS INDEX: 14.51 KG/M2 | WEIGHT: 85 LBS | HEIGHT: 64 IN | HEART RATE: 78 BPM | SYSTOLIC BLOOD PRESSURE: 150 MMHG | DIASTOLIC BLOOD PRESSURE: 93 MMHG

## 2018-03-14 DIAGNOSIS — K25.9 GASTRIC ULCER, UNSPECIFIED AS ACUTE OR CHRONIC, W/OUT HEMORRHAGE OR PERFORATION: ICD-10-CM

## 2018-03-14 DIAGNOSIS — Z86.010 PERSONAL HISTORY OF COLONIC POLYPS: ICD-10-CM

## 2018-03-14 PROCEDURE — 99214 OFFICE O/P EST MOD 30 MIN: CPT

## 2018-03-22 ENCOUNTER — APPOINTMENT (OUTPATIENT)
Dept: INTERNAL MEDICINE | Facility: CLINIC | Age: 63
End: 2018-03-22

## 2018-03-23 ENCOUNTER — MEDICATION RENEWAL (OUTPATIENT)
Age: 63
End: 2018-03-23

## 2018-04-09 ENCOUNTER — MEDICATION RENEWAL (OUTPATIENT)
Age: 63
End: 2018-04-09

## 2018-04-10 ENCOUNTER — APPOINTMENT (OUTPATIENT)
Dept: PAIN MANAGEMENT | Facility: CLINIC | Age: 63
End: 2018-04-10
Payer: MEDICARE

## 2018-04-10 VITALS
BODY MASS INDEX: 14.68 KG/M2 | HEART RATE: 103 BPM | SYSTOLIC BLOOD PRESSURE: 144 MMHG | DIASTOLIC BLOOD PRESSURE: 93 MMHG | WEIGHT: 86 LBS | HEIGHT: 64 IN

## 2018-04-10 DIAGNOSIS — M54.6 PAIN IN THORACIC SPINE: ICD-10-CM

## 2018-04-10 PROCEDURE — 99213 OFFICE O/P EST LOW 20 MIN: CPT

## 2018-04-12 ENCOUNTER — OUTPATIENT (OUTPATIENT)
Dept: OUTPATIENT SERVICES | Facility: HOSPITAL | Age: 63
LOS: 1 days | Discharge: HOME | End: 2018-04-12

## 2018-04-12 ENCOUNTER — APPOINTMENT (OUTPATIENT)
Dept: MRI IMAGING | Facility: IMAGING CENTER | Age: 63
End: 2018-04-12

## 2018-04-12 DIAGNOSIS — D64.9 ANEMIA, UNSPECIFIED: ICD-10-CM

## 2018-04-12 PROBLEM — M54.6 THORACIC BACK PAIN: Status: ACTIVE | Noted: 2017-05-11

## 2018-04-17 ENCOUNTER — APPOINTMENT (OUTPATIENT)
Dept: HEMATOLOGY ONCOLOGY | Facility: CLINIC | Age: 63
End: 2018-04-17

## 2018-04-18 ENCOUNTER — APPOINTMENT (OUTPATIENT)
Dept: GASTROENTEROLOGY | Facility: CLINIC | Age: 63
End: 2018-04-18
Payer: MEDICARE

## 2018-04-18 PROCEDURE — 91110 GI TRC IMG INTRAL ESOPH-ILE: CPT

## 2018-04-23 ENCOUNTER — RX RENEWAL (OUTPATIENT)
Age: 63
End: 2018-04-23

## 2018-04-25 ENCOUNTER — APPOINTMENT (OUTPATIENT)
Dept: GASTROENTEROLOGY | Facility: CLINIC | Age: 63
End: 2018-04-25
Payer: MEDICARE

## 2018-04-25 ENCOUNTER — APPOINTMENT (OUTPATIENT)
Dept: INTERNAL MEDICINE | Facility: CLINIC | Age: 63
End: 2018-04-25
Payer: MEDICARE

## 2018-04-25 ENCOUNTER — RX RENEWAL (OUTPATIENT)
Age: 63
End: 2018-04-25

## 2018-04-25 VITALS
RESPIRATION RATE: 14 BRPM | WEIGHT: 89 LBS | SYSTOLIC BLOOD PRESSURE: 140 MMHG | HEIGHT: 64 IN | OXYGEN SATURATION: 97 % | BODY MASS INDEX: 15.19 KG/M2 | TEMPERATURE: 98.3 F | DIASTOLIC BLOOD PRESSURE: 82 MMHG | HEART RATE: 84 BPM

## 2018-04-25 DIAGNOSIS — R19.7 DIARRHEA, UNSPECIFIED: ICD-10-CM

## 2018-04-25 PROCEDURE — 99214 OFFICE O/P EST MOD 30 MIN: CPT

## 2018-05-07 ENCOUNTER — MEDICATION RENEWAL (OUTPATIENT)
Age: 63
End: 2018-05-07

## 2018-05-15 ENCOUNTER — APPOINTMENT (OUTPATIENT)
Dept: NEUROLOGY | Facility: CLINIC | Age: 63
End: 2018-05-15
Payer: MEDICARE

## 2018-05-15 VITALS
HEART RATE: 100 BPM | SYSTOLIC BLOOD PRESSURE: 135 MMHG | HEIGHT: 64 IN | WEIGHT: 82 LBS | BODY MASS INDEX: 14 KG/M2 | DIASTOLIC BLOOD PRESSURE: 90 MMHG

## 2018-05-15 PROCEDURE — 99215 OFFICE O/P EST HI 40 MIN: CPT

## 2018-05-23 ENCOUNTER — FORM ENCOUNTER (OUTPATIENT)
Age: 63
End: 2018-05-23

## 2018-05-24 ENCOUNTER — APPOINTMENT (OUTPATIENT)
Dept: MAMMOGRAPHY | Facility: IMAGING CENTER | Age: 63
End: 2018-05-24
Payer: MEDICARE

## 2018-05-24 ENCOUNTER — OUTPATIENT (OUTPATIENT)
Dept: OUTPATIENT SERVICES | Facility: HOSPITAL | Age: 63
LOS: 1 days | End: 2018-05-24
Payer: COMMERCIAL

## 2018-05-24 ENCOUNTER — APPOINTMENT (OUTPATIENT)
Dept: ULTRASOUND IMAGING | Facility: IMAGING CENTER | Age: 63
End: 2018-05-24
Payer: MEDICARE

## 2018-05-24 DIAGNOSIS — Z08 ENCOUNTER FOR FOLLOW-UP EXAMINATION AFTER COMPLETED TREATMENT FOR MALIGNANT NEOPLASM: ICD-10-CM

## 2018-05-24 DIAGNOSIS — Z85.3 PERSONAL HISTORY OF MALIGNANT NEOPLASM OF BREAST: ICD-10-CM

## 2018-05-24 PROCEDURE — 77063 BREAST TOMOSYNTHESIS BI: CPT

## 2018-05-24 PROCEDURE — 77063 BREAST TOMOSYNTHESIS BI: CPT | Mod: 26

## 2018-05-24 PROCEDURE — 76641 ULTRASOUND BREAST COMPLETE: CPT | Mod: 26,50

## 2018-05-24 PROCEDURE — 77067 SCR MAMMO BI INCL CAD: CPT

## 2018-05-24 PROCEDURE — 77067 SCR MAMMO BI INCL CAD: CPT | Mod: 26

## 2018-05-24 PROCEDURE — 76641 ULTRASOUND BREAST COMPLETE: CPT

## 2018-05-25 ENCOUNTER — MEDICATION RENEWAL (OUTPATIENT)
Age: 63
End: 2018-05-25

## 2018-06-01 ENCOUNTER — APPOINTMENT (OUTPATIENT)
Dept: ENDOCRINOLOGY | Facility: CLINIC | Age: 63
End: 2018-06-01

## 2018-06-01 ENCOUNTER — RX RENEWAL (OUTPATIENT)
Age: 63
End: 2018-06-01

## 2018-06-02 ENCOUNTER — APPOINTMENT (OUTPATIENT)
Dept: INTERNAL MEDICINE | Facility: CLINIC | Age: 63
End: 2018-06-02
Payer: MEDICARE

## 2018-06-02 VITALS
BODY MASS INDEX: 14.17 KG/M2 | HEART RATE: 112 BPM | TEMPERATURE: 98 F | HEIGHT: 64 IN | OXYGEN SATURATION: 98 % | DIASTOLIC BLOOD PRESSURE: 88 MMHG | WEIGHT: 83 LBS | SYSTOLIC BLOOD PRESSURE: 114 MMHG

## 2018-06-02 DIAGNOSIS — R91.1 SOLITARY PULMONARY NODULE: ICD-10-CM

## 2018-06-02 PROCEDURE — 99213 OFFICE O/P EST LOW 20 MIN: CPT

## 2018-06-02 NOTE — ASSESSMENT
[FreeTextEntry1] : adv to get the following w/u \par \par - f/u GI after stopping the PPI /pancreatic enzymes\par - CT scan chest \par - endo f/u for adrenal nodule \par \par f/u in 3 week \par - nutritionist

## 2018-06-02 NOTE — HISTORY OF PRESENT ILLNESS
[FreeTextEntry1] : f/u \par ct to lose weight / etiology unclear \par saw GI - has had colonoscopy / EGD / capsule endoscopy - incomplete - no cause found \par she is off of the PPI and pancreatic enzymes and goes back to GI to f/u in 2 weeks\par she has had adjustment of her seizure meds recently - control sub-optimal \par she also has an adrenal nodule - following with endo this month\par \par per notes , pt had PA for the CT scan  chest but she did not go for it bc she left [facility] for her son's graduation \par taking Prolia for the OP

## 2018-06-02 NOTE — PHYSICAL EXAM
[No Acute Distress] : no acute distress [Normal Outer Ear/Nose] : the outer ears and nose were normal in appearance [Normal Oropharynx] : the oropharynx was normal [No JVD] : no jugular venous distention [Supple] : supple [No Respiratory Distress] : no respiratory distress  [Clear to Auscultation] : lungs were clear to auscultation bilaterally [No Accessory Muscle Use] : no accessory muscle use [Normal Rate] : normal rate  [Regular Rhythm] : with a regular rhythm [Soft] : abdomen soft [Non Tender] : non-tender [No HSM] : no HSM [Normal Bowel Sounds] : normal bowel sounds

## 2018-06-05 ENCOUNTER — APPOINTMENT (OUTPATIENT)
Dept: PAIN MANAGEMENT | Facility: CLINIC | Age: 63
End: 2018-06-05
Payer: MEDICARE

## 2018-06-05 VITALS
BODY MASS INDEX: 14.17 KG/M2 | HEIGHT: 64 IN | SYSTOLIC BLOOD PRESSURE: 147 MMHG | WEIGHT: 83 LBS | DIASTOLIC BLOOD PRESSURE: 96 MMHG | HEART RATE: 89 BPM

## 2018-06-05 PROCEDURE — 99213 OFFICE O/P EST LOW 20 MIN: CPT

## 2018-06-11 ENCOUNTER — FORM ENCOUNTER (OUTPATIENT)
Age: 63
End: 2018-06-11

## 2018-06-12 ENCOUNTER — APPOINTMENT (OUTPATIENT)
Dept: CT IMAGING | Facility: IMAGING CENTER | Age: 63
End: 2018-06-12
Payer: MEDICARE

## 2018-06-12 ENCOUNTER — OUTPATIENT (OUTPATIENT)
Dept: OUTPATIENT SERVICES | Facility: HOSPITAL | Age: 63
LOS: 1 days | End: 2018-06-12
Payer: COMMERCIAL

## 2018-06-12 DIAGNOSIS — R91.1 SOLITARY PULMONARY NODULE: ICD-10-CM

## 2018-06-12 PROCEDURE — 71250 CT THORAX DX C-: CPT

## 2018-06-12 PROCEDURE — 71250 CT THORAX DX C-: CPT | Mod: 26

## 2018-06-13 ENCOUNTER — APPOINTMENT (OUTPATIENT)
Dept: ENDOCRINOLOGY | Facility: CLINIC | Age: 63
End: 2018-06-13

## 2018-06-19 ENCOUNTER — OUTPATIENT (OUTPATIENT)
Dept: OUTPATIENT SERVICES | Facility: HOSPITAL | Age: 63
LOS: 1 days | Discharge: HOME | End: 2018-06-19

## 2018-06-19 DIAGNOSIS — D64.9 ANEMIA, UNSPECIFIED: ICD-10-CM

## 2018-06-21 ENCOUNTER — APPOINTMENT (OUTPATIENT)
Dept: HEMATOLOGY ONCOLOGY | Facility: CLINIC | Age: 63
End: 2018-06-21
Payer: MEDICARE

## 2018-06-21 ENCOUNTER — RESULT REVIEW (OUTPATIENT)
Age: 63
End: 2018-06-21

## 2018-06-21 VITALS
DIASTOLIC BLOOD PRESSURE: 83 MMHG | TEMPERATURE: 99 F | HEART RATE: 98 BPM | RESPIRATION RATE: 16 BRPM | OXYGEN SATURATION: 99 % | BODY MASS INDEX: 14 KG/M2 | WEIGHT: 81.57 LBS | SYSTOLIC BLOOD PRESSURE: 129 MMHG

## 2018-06-21 LAB
BASOPHILS # BLD AUTO: 0.1 K/UL — SIGNIFICANT CHANGE UP (ref 0–0.2)
BASOPHILS NFR BLD AUTO: 0.7 % — SIGNIFICANT CHANGE UP (ref 0–2)
EOSINOPHIL # BLD AUTO: 0.2 K/UL — SIGNIFICANT CHANGE UP (ref 0–0.5)
EOSINOPHIL NFR BLD AUTO: 2.3 % — SIGNIFICANT CHANGE UP (ref 0–6)
HCT VFR BLD CALC: 38.2 % — SIGNIFICANT CHANGE UP (ref 34.5–45)
HGB BLD-MCNC: 13.3 G/DL — SIGNIFICANT CHANGE UP (ref 11.5–15.5)
LYMPHOCYTES # BLD AUTO: 2.1 K/UL — SIGNIFICANT CHANGE UP (ref 1–3.3)
LYMPHOCYTES # BLD AUTO: 27.5 % — SIGNIFICANT CHANGE UP (ref 13–44)
MCHC RBC-ENTMCNC: 34.8 G/DL — SIGNIFICANT CHANGE UP (ref 32–36)
MCHC RBC-ENTMCNC: 34.8 PG — HIGH (ref 27–34)
MCV RBC AUTO: 100 FL — SIGNIFICANT CHANGE UP (ref 80–100)
MONOCYTES # BLD AUTO: 0.4 K/UL — SIGNIFICANT CHANGE UP (ref 0–0.9)
MONOCYTES NFR BLD AUTO: 5.5 % — SIGNIFICANT CHANGE UP (ref 2–14)
NEUTROPHILS # BLD AUTO: 4.8 K/UL — SIGNIFICANT CHANGE UP (ref 1.8–7.4)
NEUTROPHILS NFR BLD AUTO: 64 % — SIGNIFICANT CHANGE UP (ref 43–77)
PLATELET # BLD AUTO: 213 K/UL — SIGNIFICANT CHANGE UP (ref 150–400)
RBC # BLD: 3.82 M/UL — SIGNIFICANT CHANGE UP (ref 3.8–5.2)
RBC # FLD: 12.9 % — SIGNIFICANT CHANGE UP (ref 10.3–14.5)
WBC # BLD: 7.5 K/UL — SIGNIFICANT CHANGE UP (ref 3.8–10.5)
WBC # FLD AUTO: 7.5 K/UL — SIGNIFICANT CHANGE UP (ref 3.8–10.5)

## 2018-06-21 PROCEDURE — 99213 OFFICE O/P EST LOW 20 MIN: CPT

## 2018-06-24 LAB
ALBUMIN SERPL ELPH-MCNC: 4.3 G/DL
ALP BLD-CCNC: 97 U/L
ALT SERPL-CCNC: 13 U/L
ANION GAP SERPL CALC-SCNC: 14 MMOL/L
AST SERPL-CCNC: 18 U/L
BILIRUB SERPL-MCNC: 0.3 MG/DL
BUN SERPL-MCNC: 14 MG/DL
CALCIUM SERPL-MCNC: 9.5 MG/DL
CHLORIDE SERPL-SCNC: 104 MMOL/L
CO2 SERPL-SCNC: 25 MMOL/L
CREAT SERPL-MCNC: 0.62 MG/DL
GLUCOSE SERPL-MCNC: 138 MG/DL
LDH SERPL-CCNC: 240 U/L
POTASSIUM SERPL-SCNC: 4.2 MMOL/L
PROT SERPL-MCNC: 7.5 G/DL
SODIUM SERPL-SCNC: 143 MMOL/L

## 2018-06-25 ENCOUNTER — MEDICATION RENEWAL (OUTPATIENT)
Age: 63
End: 2018-06-25

## 2018-06-29 ENCOUNTER — MEDICATION RENEWAL (OUTPATIENT)
Age: 63
End: 2018-06-29

## 2018-07-03 ENCOUNTER — APPOINTMENT (OUTPATIENT)
Dept: OBGYN | Facility: CLINIC | Age: 63
End: 2018-07-03

## 2018-07-11 ENCOUNTER — NON-APPOINTMENT (OUTPATIENT)
Age: 63
End: 2018-07-11

## 2018-07-11 ENCOUNTER — APPOINTMENT (OUTPATIENT)
Dept: CARDIOLOGY | Facility: CLINIC | Age: 63
End: 2018-07-11
Payer: MEDICARE

## 2018-07-11 VITALS — SYSTOLIC BLOOD PRESSURE: 110 MMHG | DIASTOLIC BLOOD PRESSURE: 65 MMHG | OXYGEN SATURATION: 100 % | HEART RATE: 71 BPM

## 2018-07-11 VITALS — DIASTOLIC BLOOD PRESSURE: 62 MMHG | HEART RATE: 76 BPM | OXYGEN SATURATION: 99 % | SYSTOLIC BLOOD PRESSURE: 114 MMHG

## 2018-07-11 VITALS
WEIGHT: 81 LBS | DIASTOLIC BLOOD PRESSURE: 75 MMHG | HEART RATE: 76 BPM | OXYGEN SATURATION: 99 % | HEIGHT: 64 IN | RESPIRATION RATE: 12 BRPM | SYSTOLIC BLOOD PRESSURE: 126 MMHG | BODY MASS INDEX: 13.83 KG/M2

## 2018-07-11 VITALS — OXYGEN SATURATION: 97 % | DIASTOLIC BLOOD PRESSURE: 74 MMHG | HEART RATE: 83 BPM | SYSTOLIC BLOOD PRESSURE: 126 MMHG

## 2018-07-11 VITALS — HEART RATE: 82 BPM | SYSTOLIC BLOOD PRESSURE: 120 MMHG | DIASTOLIC BLOOD PRESSURE: 76 MMHG | OXYGEN SATURATION: 97 %

## 2018-07-11 PROCEDURE — 93000 ELECTROCARDIOGRAM COMPLETE: CPT

## 2018-07-11 PROCEDURE — 99215 OFFICE O/P EST HI 40 MIN: CPT

## 2018-07-11 PROCEDURE — 93040 RHYTHM ECG WITH REPORT: CPT | Mod: 59

## 2018-07-19 ENCOUNTER — OTHER (OUTPATIENT)
Age: 63
End: 2018-07-19

## 2018-07-21 ENCOUNTER — OTHER (OUTPATIENT)
Age: 63
End: 2018-07-21

## 2018-07-26 ENCOUNTER — APPOINTMENT (OUTPATIENT)
Dept: ENDOCRINOLOGY | Facility: CLINIC | Age: 63
End: 2018-07-26
Payer: MEDICARE

## 2018-07-26 ENCOUNTER — APPOINTMENT (OUTPATIENT)
Dept: ENDOCRINOLOGY | Facility: CLINIC | Age: 63
End: 2018-07-26

## 2018-07-26 VITALS
HEIGHT: 64 IN | TEMPERATURE: 98.7 F | DIASTOLIC BLOOD PRESSURE: 86 MMHG | HEART RATE: 87 BPM | BODY MASS INDEX: 14.68 KG/M2 | SYSTOLIC BLOOD PRESSURE: 134 MMHG | WEIGHT: 86 LBS

## 2018-07-26 DIAGNOSIS — E73.9 LACTOSE INTOLERANCE, UNSPECIFIED: ICD-10-CM

## 2018-07-26 PROCEDURE — 99215 OFFICE O/P EST HI 40 MIN: CPT

## 2018-07-26 RX ORDER — OMEPRAZOLE 40 MG/1
40 CAPSULE, DELAYED RELEASE ORAL
Qty: 30 | Refills: 1 | Status: DISCONTINUED | COMMUNITY
Start: 2017-12-01 | End: 2018-07-26

## 2018-07-31 ENCOUNTER — APPOINTMENT (OUTPATIENT)
Dept: CV DIAGNOSITCS | Facility: HOSPITAL | Age: 63
End: 2018-07-31

## 2018-08-01 ENCOUNTER — MEDICATION RENEWAL (OUTPATIENT)
Age: 63
End: 2018-08-01

## 2018-08-09 ENCOUNTER — OUTPATIENT (OUTPATIENT)
Dept: OUTPATIENT SERVICES | Facility: HOSPITAL | Age: 63
LOS: 1 days | End: 2018-08-09

## 2018-08-09 ENCOUNTER — APPOINTMENT (OUTPATIENT)
Dept: CV DIAGNOSITCS | Facility: HOSPITAL | Age: 63
End: 2018-08-09
Payer: MEDICARE

## 2018-08-09 DIAGNOSIS — I34.0 NONRHEUMATIC MITRAL (VALVE) INSUFFICIENCY: ICD-10-CM

## 2018-08-09 PROCEDURE — 93306 TTE W/DOPPLER COMPLETE: CPT | Mod: 26

## 2018-08-10 ENCOUNTER — RESULT REVIEW (OUTPATIENT)
Age: 63
End: 2018-08-10

## 2018-08-10 ENCOUNTER — RESULT CHARGE (OUTPATIENT)
Age: 63
End: 2018-08-10

## 2018-08-13 ENCOUNTER — RESULT REVIEW (OUTPATIENT)
Age: 63
End: 2018-08-13

## 2018-08-13 ENCOUNTER — APPOINTMENT (OUTPATIENT)
Dept: PAIN MANAGEMENT | Facility: CLINIC | Age: 63
End: 2018-08-13
Payer: MEDICARE

## 2018-08-13 VITALS
DIASTOLIC BLOOD PRESSURE: 79 MMHG | WEIGHT: 87 LBS | SYSTOLIC BLOOD PRESSURE: 125 MMHG | HEART RATE: 79 BPM | BODY MASS INDEX: 14.85 KG/M2 | HEIGHT: 64 IN

## 2018-08-13 LAB
24R-OH-CALCIDIOL SERPL-MCNC: 55.6 PG/ML
25(OH)D3 SERPL-MCNC: 19.9 NG/ML
ALBUMIN SERPL ELPH-MCNC: 4.5 G/DL
ALDOSTERONE SERUM: 3.4 NG/DL
ALP BLD-CCNC: 110 U/L
ALT SERPL-CCNC: 10 U/L
ANION GAP SERPL CALC-SCNC: 16 MMOL/L
AST SERPL-CCNC: 19 U/L
BILIRUB SERPL-MCNC: 0.2 MG/DL
BUN SERPL-MCNC: 19 MG/DL
CALCIUM SERPL-MCNC: 9.7 MG/DL
CALCIUM SERPL-MCNC: 9.7 MG/DL
CHLORIDE SERPL-SCNC: 104 MMOL/L
CO2 SERPL-SCNC: 24 MMOL/L
CORTICOSTEROID BIND GLOBULIN: 3 MG/DL
CORTIS SERPL-MCNC: 15.1 UG/DL
CREAT SERPL-MCNC: 0.64 MG/DL
GLUCOSE SERPL-MCNC: 102 MG/DL
MAGNESIUM SERPL-MCNC: 2.1 MG/DL
METANEPHRINE, PL: <10 PG/ML
NORMETANEPHRINE, PL: 49 PG/ML
PARATHYROID HORMONE INTACT: 46 PG/ML
PHOSPHATE SERPL-MCNC: 3.2 MG/DL
POTASSIUM SERPL-SCNC: 4 MMOL/L
PROT SERPL-MCNC: 7.6 G/DL
SODIUM SERPL-SCNC: 144 MMOL/L
T4 FREE SERPL-MCNC: 0.9 NG/DL
THYROGLOB AB SERPL-ACNC: <20 IU/ML
THYROPEROXIDASE AB SERPL IA-ACNC: 23.6 IU/ML
TSH SERPL-ACNC: 1.22 UIU/ML

## 2018-08-13 PROCEDURE — 99213 OFFICE O/P EST LOW 20 MIN: CPT

## 2018-08-16 ENCOUNTER — MEDICATION RENEWAL (OUTPATIENT)
Age: 63
End: 2018-08-16

## 2018-08-20 ENCOUNTER — APPOINTMENT (OUTPATIENT)
Dept: NEUROLOGY | Facility: CLINIC | Age: 63
End: 2018-08-20
Payer: MEDICARE

## 2018-08-20 VITALS
DIASTOLIC BLOOD PRESSURE: 73 MMHG | HEIGHT: 64 IN | SYSTOLIC BLOOD PRESSURE: 115 MMHG | HEART RATE: 78 BPM | BODY MASS INDEX: 14.85 KG/M2 | WEIGHT: 87 LBS

## 2018-08-20 VITALS
HEIGHT: 64 IN | BODY MASS INDEX: 14.51 KG/M2 | SYSTOLIC BLOOD PRESSURE: 115 MMHG | DIASTOLIC BLOOD PRESSURE: 73 MMHG | WEIGHT: 85 LBS | HEART RATE: 74 BPM

## 2018-08-20 PROCEDURE — 99215 OFFICE O/P EST HI 40 MIN: CPT

## 2018-08-27 ENCOUNTER — OTHER (OUTPATIENT)
Age: 63
End: 2018-08-27

## 2018-09-11 ENCOUNTER — APPOINTMENT (OUTPATIENT)
Dept: ENDOCRINOLOGY | Facility: CLINIC | Age: 63
End: 2018-09-11

## 2018-09-18 ENCOUNTER — RX RENEWAL (OUTPATIENT)
Age: 63
End: 2018-09-18

## 2018-09-20 ENCOUNTER — APPOINTMENT (OUTPATIENT)
Dept: INTERNAL MEDICINE | Facility: CLINIC | Age: 63
End: 2018-09-20
Payer: MEDICARE

## 2018-09-20 VITALS
HEART RATE: 68 BPM | TEMPERATURE: 97.9 F | OXYGEN SATURATION: 99 % | SYSTOLIC BLOOD PRESSURE: 90 MMHG | HEIGHT: 63 IN | BODY MASS INDEX: 15.41 KG/M2 | DIASTOLIC BLOOD PRESSURE: 60 MMHG | WEIGHT: 87 LBS

## 2018-09-20 DIAGNOSIS — Z92.21 PERSONAL HISTORY OF ANTINEOPLASTIC CHEMOTHERAPY: ICD-10-CM

## 2018-09-20 DIAGNOSIS — R63.4 ABNORMAL WEIGHT LOSS: ICD-10-CM

## 2018-09-20 DIAGNOSIS — Z83.79 FAMILY HISTORY OF OTHER DISEASES OF THE DIGESTIVE SYSTEM: ICD-10-CM

## 2018-09-20 DIAGNOSIS — Z80.9 FAMILY HISTORY OF MALIGNANT NEOPLASM, UNSPECIFIED: ICD-10-CM

## 2018-09-20 PROCEDURE — 99396 PREV VISIT EST AGE 40-64: CPT | Mod: 25

## 2018-09-20 PROCEDURE — 90688 IIV4 VACCINE SPLT 0.5 ML IM: CPT

## 2018-09-20 PROCEDURE — 36415 COLL VENOUS BLD VENIPUNCTURE: CPT

## 2018-09-20 PROCEDURE — G0008: CPT

## 2018-09-20 NOTE — REVIEW OF SYSTEMS
[Recent Change In Weight] : ~T recent weight change [Joint Pain] : joint pain [Fainting] : fainting [Anxiety] : anxiety [Negative] : Integumentary

## 2018-09-20 NOTE — PHYSICAL EXAM
[No Acute Distress] : no acute distress [Well Nourished] : well nourished [Well Developed] : well developed [Well-Appearing] : well-appearing [EOMI] : extraocular movements intact [Normal Outer Ear/Nose] : the outer ears and nose were normal in appearance [Normal Oropharynx] : the oropharynx was normal [No JVD] : no jugular venous distention [Supple] : supple [No Lymphadenopathy] : no lymphadenopathy [Thyroid Normal, No Nodules] : the thyroid was normal and there were no nodules present [No Respiratory Distress] : no respiratory distress  [Clear to Auscultation] : lungs were clear to auscultation bilaterally [No Accessory Muscle Use] : no accessory muscle use [Normal Rate] : normal rate  [Regular Rhythm] : with a regular rhythm [Normal S1, S2] : normal S1 and S2 [No Varicosities] : no varicosities [No Edema] : there was no peripheral edema [No Extremity Clubbing/Cyanosis] : no extremity clubbing/cyanosis [Soft] : abdomen soft [Non Tender] : non-tender [Non-distended] : non-distended [No HSM] : no HSM [Normal Bowel Sounds] : normal bowel sounds [No CVA Tenderness] : no CVA  tenderness [No Spinal Tenderness] : no spinal tenderness [No Joint Swelling] : no joint swelling [Grossly Normal Strength/Tone] : grossly normal strength/tone [No Rash] : no rash [No Skin Lesions] : no skin lesions [Normal Gait] : normal gait [Coordination Grossly Intact] : coordination grossly intact [Speech Grossly Normal] : speech grossly normal [Memory Grossly Normal] : memory grossly normal [Normal Mood] : the mood was normal [Normal Insight/Judgement] : insight and judgment were intact

## 2018-09-20 NOTE — ASSESSMENT
[FreeTextEntry1] : # CPE \par \par  - ct to lose weight / adv to contact nutritionist at [facility]/ consider referral to marijuana clinic - will discuss with pain - management \par - check lipid and A1C \par - vit nl \par - UTD mammo / colonoscopy\par - OP- BMD last yr, has not started tx bc she is starting dental work \par - flu shot / UTD with other vaccine\par - not driving

## 2018-09-20 NOTE — HEALTH RISK ASSESSMENT
[Fair] :  ~his/her~ mood as fair [] : No [Two or more falls in past year] : Patient reported two or more falls in the past year [1] : 2) Feeling down, depressed, or hopeless for several days (1) [Patient reported mammogram was normal] : Patient reported mammogram was normal [Patient reported bone density results were abnormal] : Patient reported bone density results were abnormal [Patient reported colonoscopy was normal] : Patient reported colonoscopy was normal [Change in mental status noted] : No change in mental status noted [Financial] : financial [With Family] : lives with family [Unemployed] : unemployed [] :  [Sexually Active] : sexually active [Feels Safe at Home] : Feels safe at home [Fully functional (bathing, dressing, toileting, transferring, walking, feeding)] : Fully functional (bathing, dressing, toileting, transferring, walking, feeding) [Reports changes in hearing] : Reports changes in hearing [Reports changes in vision] : Reports changes in vision [Reports changes in dental health] : Reports changes in dental health [Smoke Detector] : smoke detector [Carbon Monoxide Detector] : carbon monoxide detector [Safety elements used in home] : safety elements used in home [Seat Belt] :  uses seat belt [MammogramDate] : 2017 [BoneDensityDate] : 2017 [BoneDensityComments] : OSTEOPOROSIS [ColonoscopyDate] : 2017 [de-identified] : Needs help with transport / laundry , cooking - b/c seizure

## 2018-09-21 LAB
CHOLEST SERPL-MCNC: 205 MG/DL
CHOLEST/HDLC SERPL: 2.6 RATIO
HBA1C MFR BLD HPLC: 5.1 %
HDLC SERPL-MCNC: 79 MG/DL
LDLC SERPL CALC-MCNC: 104 MG/DL
TRIGL SERPL-MCNC: 109 MG/DL

## 2018-09-24 ENCOUNTER — RX RENEWAL (OUTPATIENT)
Age: 63
End: 2018-09-24

## 2018-09-27 ENCOUNTER — MEDICATION RENEWAL (OUTPATIENT)
Age: 63
End: 2018-09-27

## 2018-10-09 ENCOUNTER — APPOINTMENT (OUTPATIENT)
Dept: PAIN MANAGEMENT | Facility: CLINIC | Age: 63
End: 2018-10-09
Payer: MEDICARE

## 2018-10-09 VITALS
HEIGHT: 63 IN | WEIGHT: 87 LBS | BODY MASS INDEX: 15.41 KG/M2 | HEART RATE: 80 BPM | DIASTOLIC BLOOD PRESSURE: 81 MMHG | SYSTOLIC BLOOD PRESSURE: 139 MMHG

## 2018-10-09 PROCEDURE — 99213 OFFICE O/P EST LOW 20 MIN: CPT

## 2018-10-11 ENCOUNTER — CHART COPY (OUTPATIENT)
Age: 63
End: 2018-10-11

## 2018-10-19 ENCOUNTER — MEDICATION RENEWAL (OUTPATIENT)
Age: 63
End: 2018-10-19

## 2018-10-23 ENCOUNTER — MEDICATION RENEWAL (OUTPATIENT)
Age: 63
End: 2018-10-23

## 2018-10-24 RX ORDER — DENOSUMAB 60 MG/ML
60 INJECTION SUBCUTANEOUS
Qty: 1 | Refills: 1 | Status: DISCONTINUED | COMMUNITY
Start: 2018-05-17 | End: 2018-10-24

## 2018-11-13 ENCOUNTER — APPOINTMENT (OUTPATIENT)
Dept: SURGICAL ONCOLOGY | Facility: CLINIC | Age: 63
End: 2018-11-13
Payer: MEDICARE

## 2018-11-13 VITALS
SYSTOLIC BLOOD PRESSURE: 120 MMHG | WEIGHT: 88 LBS | HEART RATE: 81 BPM | HEIGHT: 63 IN | RESPIRATION RATE: 14 BRPM | DIASTOLIC BLOOD PRESSURE: 68 MMHG | BODY MASS INDEX: 15.59 KG/M2

## 2018-11-13 PROCEDURE — 99214 OFFICE O/P EST MOD 30 MIN: CPT

## 2018-11-15 ENCOUNTER — MEDICATION RENEWAL (OUTPATIENT)
Age: 63
End: 2018-11-15

## 2018-12-03 ENCOUNTER — APPOINTMENT (OUTPATIENT)
Dept: PAIN MANAGEMENT | Facility: CLINIC | Age: 63
End: 2018-12-03

## 2018-12-10 ENCOUNTER — APPOINTMENT (OUTPATIENT)
Dept: PAIN MANAGEMENT | Facility: CLINIC | Age: 63
End: 2018-12-10
Payer: MEDICARE

## 2018-12-10 VITALS
DIASTOLIC BLOOD PRESSURE: 85 MMHG | WEIGHT: 89 LBS | BODY MASS INDEX: 15.77 KG/M2 | HEART RATE: 90 BPM | HEIGHT: 63 IN | SYSTOLIC BLOOD PRESSURE: 144 MMHG

## 2018-12-10 PROCEDURE — 99213 OFFICE O/P EST LOW 20 MIN: CPT

## 2018-12-11 ENCOUNTER — OUTPATIENT (OUTPATIENT)
Dept: OUTPATIENT SERVICES | Facility: HOSPITAL | Age: 63
LOS: 1 days | Discharge: HOME | End: 2018-12-11

## 2018-12-11 DIAGNOSIS — D64.9 ANEMIA, UNSPECIFIED: ICD-10-CM

## 2018-12-14 ENCOUNTER — APPOINTMENT (OUTPATIENT)
Dept: NEUROLOGY | Facility: CLINIC | Age: 63
End: 2018-12-14
Payer: MEDICARE

## 2018-12-14 VITALS
BODY MASS INDEX: 15.77 KG/M2 | SYSTOLIC BLOOD PRESSURE: 125 MMHG | HEIGHT: 63 IN | DIASTOLIC BLOOD PRESSURE: 77 MMHG | HEART RATE: 96 BPM | WEIGHT: 89 LBS

## 2018-12-14 PROCEDURE — 99215 OFFICE O/P EST HI 40 MIN: CPT

## 2018-12-17 ENCOUNTER — RESULT REVIEW (OUTPATIENT)
Age: 63
End: 2018-12-17

## 2018-12-17 ENCOUNTER — APPOINTMENT (OUTPATIENT)
Dept: HEMATOLOGY ONCOLOGY | Facility: CLINIC | Age: 63
End: 2018-12-17
Payer: MEDICARE

## 2018-12-17 VITALS
TEMPERATURE: 98.6 F | HEART RATE: 89 BPM | SYSTOLIC BLOOD PRESSURE: 133 MMHG | WEIGHT: 88.18 LBS | RESPIRATION RATE: 16 BRPM | DIASTOLIC BLOOD PRESSURE: 86 MMHG | BODY MASS INDEX: 15.62 KG/M2 | OXYGEN SATURATION: 93 %

## 2018-12-17 DIAGNOSIS — Z85.3 PERSONAL HISTORY OF MALIGNANT NEOPLASM OF BREAST: ICD-10-CM

## 2018-12-17 LAB
BASOPHILS # BLD AUTO: 0.1 K/UL — SIGNIFICANT CHANGE UP (ref 0–0.2)
BASOPHILS NFR BLD AUTO: 0.9 % — SIGNIFICANT CHANGE UP (ref 0–2)
EOSINOPHIL # BLD AUTO: 0.1 K/UL — SIGNIFICANT CHANGE UP (ref 0–0.5)
EOSINOPHIL NFR BLD AUTO: 1.7 % — SIGNIFICANT CHANGE UP (ref 0–6)
HCT VFR BLD CALC: 40 % — SIGNIFICANT CHANGE UP (ref 34.5–45)
HGB BLD-MCNC: 13.3 G/DL — SIGNIFICANT CHANGE UP (ref 11.5–15.5)
LYMPHOCYTES # BLD AUTO: 2.4 K/UL — SIGNIFICANT CHANGE UP (ref 1–3.3)
LYMPHOCYTES # BLD AUTO: 29.1 % — SIGNIFICANT CHANGE UP (ref 13–44)
MCHC RBC-ENTMCNC: 32.8 PG — SIGNIFICANT CHANGE UP (ref 27–34)
MCHC RBC-ENTMCNC: 33.3 G/DL — SIGNIFICANT CHANGE UP (ref 32–36)
MCV RBC AUTO: 98.5 FL — SIGNIFICANT CHANGE UP (ref 80–100)
MONOCYTES # BLD AUTO: 0.7 K/UL — SIGNIFICANT CHANGE UP (ref 0–0.9)
MONOCYTES NFR BLD AUTO: 8.5 % — SIGNIFICANT CHANGE UP (ref 2–14)
NEUTROPHILS # BLD AUTO: 4.9 K/UL — SIGNIFICANT CHANGE UP (ref 1.8–7.4)
NEUTROPHILS NFR BLD AUTO: 59.8 % — SIGNIFICANT CHANGE UP (ref 43–77)
PLATELET # BLD AUTO: 233 K/UL — SIGNIFICANT CHANGE UP (ref 150–400)
RBC # BLD: 4.06 M/UL — SIGNIFICANT CHANGE UP (ref 3.8–5.2)
RBC # FLD: 11.9 % — SIGNIFICANT CHANGE UP (ref 10.3–14.5)
WBC # BLD: 8.1 K/UL — SIGNIFICANT CHANGE UP (ref 3.8–10.5)
WBC # FLD AUTO: 8.1 K/UL — SIGNIFICANT CHANGE UP (ref 3.8–10.5)

## 2018-12-17 PROCEDURE — 99213 OFFICE O/P EST LOW 20 MIN: CPT

## 2018-12-17 NOTE — ASSESSMENT
[FreeTextEntry1] : \par 61 yo WF diagnosed with Stage 1 breast Ca in 1998. treated with CMF, Lumpectomy and RT. Was started on tamoxifen in 1999- 2004 then femara in 10/04-11/09. Tumor markers have been borderline high but stable. \par \par No new breast events\par \par RTO 6 months

## 2018-12-17 NOTE — HISTORY OF PRESENT ILLNESS
[de-identified] : \par 61 yo WF diagnosed with Stage 1 breast Ca in 1998. treated with CMF, Lumpectomy and RT. Was started on tamoxifen in 1999- 2004 then femara in 10/04-11/09. Tumor markers have been borderline high but stable.  [de-identified] : Bilateral mammography and ultrasonography 5/18 was normal\par Continues to see other physicians-notes read\par Seizure disorder being actively managed\par saw Dr Saima CARRANZA\par \par Still smoking 4 cigarettes/day

## 2018-12-18 LAB
ALBUMIN SERPL ELPH-MCNC: 4.3 G/DL
ALP BLD-CCNC: 99 U/L
ALT SERPL-CCNC: 11 U/L
ANION GAP SERPL CALC-SCNC: 14 MMOL/L
AST SERPL-CCNC: 15 U/L
BILIRUB SERPL-MCNC: <0.2 MG/DL
BUN SERPL-MCNC: 13 MG/DL
CALCIUM SERPL-MCNC: 9.7 MG/DL
CHLORIDE SERPL-SCNC: 104 MMOL/L
CO2 SERPL-SCNC: 25 MMOL/L
CREAT SERPL-MCNC: 0.7 MG/DL
GLUCOSE SERPL-MCNC: 85 MG/DL
LDH SERPL-CCNC: 244 U/L
POTASSIUM SERPL-SCNC: 5.2 MMOL/L
PROT SERPL-MCNC: 7.4 G/DL
SODIUM SERPL-SCNC: 143 MMOL/L

## 2018-12-24 ENCOUNTER — APPOINTMENT (OUTPATIENT)
Dept: NEUROLOGY | Facility: CLINIC | Age: 63
End: 2018-12-24

## 2019-01-04 LAB
ALBUMIN SERPL ELPH-MCNC: 4.1 G/DL
ALP BLD-CCNC: 104 U/L
ALT SERPL-CCNC: 11 U/L
ANION GAP SERPL CALC-SCNC: 17 MMOL/L
AST SERPL-CCNC: 17 U/L
BASOPHILS # BLD AUTO: 0.04 K/UL
BASOPHILS NFR BLD AUTO: 0.6 %
BILIRUB SERPL-MCNC: <0.2 MG/DL
BUN SERPL-MCNC: 16 MG/DL
CALCIUM SERPL-MCNC: 9.3 MG/DL
CARBAMAZEPINE SERPL-MCNC: 7.5 UG/ML
CHLORIDE SERPL-SCNC: 107 MMOL/L
CO2 SERPL-SCNC: 21 MMOL/L
CREAT SERPL-MCNC: 0.76 MG/DL
EOSINOPHIL # BLD AUTO: 0.17 K/UL
EOSINOPHIL NFR BLD AUTO: 2.7 %
GLUCOSE SERPL-MCNC: 150 MG/DL
HCT VFR BLD CALC: 39.3 %
HGB BLD-MCNC: 12.7 G/DL
IMM GRANULOCYTES NFR BLD AUTO: 0.2 %
LYMPHOCYTES # BLD AUTO: 2.11 K/UL
LYMPHOCYTES NFR BLD AUTO: 33.2 %
MAN DIFF?: NORMAL
MCHC RBC-ENTMCNC: 31.9 PG
MCHC RBC-ENTMCNC: 32.3 GM/DL
MCV RBC AUTO: 98.7 FL
MONOCYTES # BLD AUTO: 0.36 K/UL
MONOCYTES NFR BLD AUTO: 5.7 %
NEUTROPHILS # BLD AUTO: 3.67 K/UL
NEUTROPHILS NFR BLD AUTO: 57.6 %
PLATELET # BLD AUTO: 223 K/UL
POTASSIUM SERPL-SCNC: 4.8 MMOL/L
PROT SERPL-MCNC: 7 G/DL
RBC # BLD: 3.98 M/UL
RBC # FLD: 14.2 %
SODIUM SERPL-SCNC: 145 MMOL/L
WBC # FLD AUTO: 6.36 K/UL

## 2019-01-09 ENCOUNTER — APPOINTMENT (OUTPATIENT)
Dept: PAIN MANAGEMENT | Facility: CLINIC | Age: 64
End: 2019-01-09
Payer: MEDICARE

## 2019-01-09 ENCOUNTER — MEDICATION RENEWAL (OUTPATIENT)
Age: 64
End: 2019-01-09

## 2019-01-09 VITALS
SYSTOLIC BLOOD PRESSURE: 142 MMHG | WEIGHT: 89 LBS | DIASTOLIC BLOOD PRESSURE: 84 MMHG | HEART RATE: 109 BPM | BODY MASS INDEX: 15.77 KG/M2 | HEIGHT: 63 IN

## 2019-01-09 PROCEDURE — 99213 OFFICE O/P EST LOW 20 MIN: CPT

## 2019-01-09 NOTE — REASON FOR VISIT
[Follow-Up: _____] : a [unfilled] follow-up visit [FreeTextEntry1] : Chronic isolated lower back pain and headaches.

## 2019-01-09 NOTE — ASSESSMENT
[Opioids] : Patient was explained in detail about pain control by using opioids. Patient has signed and fully understands our guidelines for medication and drug screening.  Patient understands the side effects of opioids, including, but not limited to, drug tolerance, dependence, potential for addiction. This class of drugs is habit-forming and ELIJAH regulated. The sedative effects of opioids can be potentiated by taking alcohol or any sleeping pills, along with opioids. The decision to drive is patient’s responsibility, as opioids can affect his/her driving ability and ability to concentrate. The long-term place is not clear, however, patient understands that once the pain control optimizes, the goal will be to wean off the opioids. All the issues regarding opioid treatment have been addressed satisfactorily.

## 2019-01-09 NOTE — DISCUSSION/SUMMARY
[FreeTextEntry1] : Exam nonfocal. \par Her condition remains stable but active.\par \par We discussed how she is going to start the medical marrijuanna tapering up her dosage as needed.  She is not to stop the hydrocodone abruptly.  She is to wean down by 1 tablet every 1-2 weeks.  She expressed understanding of this.  \par \par She will be continued on her current treatment regimen for now, tapering down her hydrocodone as discussed above.  Istop checked. No signs of toxicity noted.  Will continue to monitor. \par  Risks of long term opioid use reviewed with the patient. \par \par Discussed with the patient smoking cessation. \par \par Last UDS 10/9/15- consistent.  \par \par She appears to be using medications reasonably and responsibly.  She denies side effects to medications-bm regular.  Her opiate agreement was reviewed and we discussed locked box for medications. \par \par She is to follow up with her epilepsy specialist-Dr. Hernandez, gastro, and oncologist.  \par \par She will RTO in one months time or sooner if clinically indicated.  [Opioids] : Patient was explained in detail about pain control by using opioids. Patient has signed and fully understands our guidelines for medication and drug screening.  Patient understands the side effects of opioids, including, but not limited to, drug tolerance, dependence, potential for addiction. This class of drugs is habit-forming and ELIJAH regulated. The sedative effects of opioids can be potentiated by taking alcohol or any sleeping pills, along with opioids. The decision to drive is patient’s responsibility, as opioids can affect his/her driving ability and ability to concentrate. The long-term place is not clear, however, patient understands that once the pain control optimizes, the goal will be to wean off the opioids. All the issues regarding opioid treatment have been addressed satisfactorily.

## 2019-01-09 NOTE — HISTORY OF PRESENT ILLNESS
[FreeTextEntry1] : She continues to be stable on her current pain medications.  She did get the medical marrijuanna (calm formulation) but she hasn't started on it yet due to her GI virus (diarrhea).  She requests guidance on the medical marrijuanna and opioid taper.    \par \par Her headaches have been fairly stable- no increase intensity or frequency. \par \par She walks daily for exercise.  She does not drive-public transportation.  She took access a ride here today.\par \par VAS=5-7/10 with medications this past month.   VAS=9-10/10 without medications. \par \par She follows with the epilepsy MDs for her seizures.  \par \par She denies depression and suicidal ideations upon full questioning.  \par \par Patient encourage to quit smoking again today. \par \par No other medical complaints.

## 2019-01-09 NOTE — PHYSICAL EXAM
[General Appearance - Alert] : alert [Person] : oriented to person [Place] : oriented to place [Motor Tone] : muscle tone was normal in all four extremities [Motor Strength] : muscle strength was normal in all four extremities [Abnormal Walk] : normal gait [Balance] : balance was intact [Sclera] : the sclera and conjunctiva were normal [Neck Appearance] : the appearance of the neck was normal [Arterial Pulses Carotid] : carotid pulses were normal with no bruits [Bowel Sounds] : normal bowel sounds [No CVA Tenderness] : no ~M costovertebral angle tenderness [Musculoskeletal - Swelling] : no joint swelling seen [Skin Color & Pigmentation] : normal skin color and pigmentation [Skin Turgor] : normal skin turgor [] : no rash

## 2019-01-16 ENCOUNTER — APPOINTMENT (OUTPATIENT)
Dept: CARDIOLOGY | Facility: CLINIC | Age: 64
End: 2019-01-16
Payer: MEDICARE

## 2019-01-16 ENCOUNTER — NON-APPOINTMENT (OUTPATIENT)
Age: 64
End: 2019-01-16

## 2019-01-16 VITALS
HEIGHT: 63 IN | SYSTOLIC BLOOD PRESSURE: 109 MMHG | WEIGHT: 87 LBS | HEART RATE: 82 BPM | DIASTOLIC BLOOD PRESSURE: 65 MMHG | OXYGEN SATURATION: 97 % | RESPIRATION RATE: 16 BRPM | BODY MASS INDEX: 15.41 KG/M2

## 2019-01-16 VITALS — SYSTOLIC BLOOD PRESSURE: 114 MMHG | OXYGEN SATURATION: 97 % | DIASTOLIC BLOOD PRESSURE: 72 MMHG | HEART RATE: 87 BPM

## 2019-01-16 VITALS — OXYGEN SATURATION: 98 % | SYSTOLIC BLOOD PRESSURE: 116 MMHG | HEART RATE: 85 BPM | DIASTOLIC BLOOD PRESSURE: 64 MMHG

## 2019-01-16 VITALS — SYSTOLIC BLOOD PRESSURE: 115 MMHG | OXYGEN SATURATION: 96 % | HEART RATE: 86 BPM | DIASTOLIC BLOOD PRESSURE: 73 MMHG

## 2019-01-16 DIAGNOSIS — R94.39 ABNORMAL RESULT OF OTHER CARDIOVASCULAR FUNCTION STUDY: ICD-10-CM

## 2019-01-16 PROCEDURE — 93040 RHYTHM ECG WITH REPORT: CPT | Mod: 59

## 2019-01-16 PROCEDURE — 99215 OFFICE O/P EST HI 40 MIN: CPT

## 2019-01-16 PROCEDURE — 93000 ELECTROCARDIOGRAM COMPLETE: CPT

## 2019-01-16 NOTE — REVIEW OF SYSTEMS
[Headache] : headache [Recent Weight Loss (___ Lbs)] : recent [unfilled] ~Ulb weight loss [Negative] : Heme/Lymph

## 2019-01-17 NOTE — ADDENDUM
[FreeTextEntry1] : I spent 60 minutes face to face time with the patient, from 10:00 to 11:00 on 1/16/19. Thirty minutes were devoted to patient counseling with emphasis on smoking cessation (see Narrative section). Prior to this visit, I reviewed office records of Mariola Blanc, Gastroenterology and Neuro / Headache/Pain management service. Labs done 6/21/18 and 3/9/18 were reviewed. I personally reviewed images of TTE done 8/9/18

## 2019-01-17 NOTE — DISCUSSION/SUMMARY
[FreeTextEntry1] : \par SMOKING CESSATION:\par We all know the health risks of smoking, and most of us know that kicking the habit is the single biggest improvement to health a smoker can make. But that doesn’t make it any easier to kick the habit. Whether you’re a teen smoker or a lifetime pack-a-day smoker, quitting can be tough.\par To increase your chances of success, you need to be motivated, have social support, an understanding of what to expect, and a personal game plan. It is possible to learn how to replace your smoking habits, manage your cravings, and join the millions of people who have kicked the habit for good.\par Smoking tobacco is both a psychological habit and a physical addiction. The act of smoking is ingrained as a daily ritual and, at the same time, the nicotine from cigarettes provides a temporary, and addictive, high. Eliminating that regular fix of nicotine will cause your body to experience physical withdrawal symptoms and cravings. To successfully quit smoking, you’ll need to address both the habit and the addiction by changing your behavior and dealing with nicotine withdrawal symptoms.\par Relieving unpleasant and overwhelming feelings without cigarettes\par Managing unpleasant feelings such as stress, depression, loneliness, fear, and anxiety are some of the most common reasons why adults smoke. When you have a bad day, it can seem like your cigarettes are your only friend. Smoking can temporarily make feelings such as sadness, stress, anxiety, depression, and boredom evaporate into thin air. As much comfort as cigarettes provide, though, it’s important to remember that there are healthier (and more effective) ways to keep unpleasant feelings in check. These may include exercising, meditating, using sensory relaxation strategies, and practicing simple breathing exercises. \par For many people, an important aspect of quitting smoking is to find alternate ways to handle these difficult feelings without smoking. Even when cigarettes are no longer a part of your life, the painful and unpleasant feelings that may have prompted you to smoke in the past will still remain. So, it’s worth spending some time thinking about the different ways you intend to deal with stressful situations and the daily irritations that would normally have you reaching for a cigarette.\par Tailoring a personal game plan to your specific needs and desires can be a big help. List the reasons why you want to quit and then keep copies of the list in the places where you’d normally keep your cigarettes, such as in your jacket, purse, or car. Your reasons for quitting smoking might include:\par I will feel healthier and have more energy, whiter teeth and fresher breath.\par I will lower my risk for cancer, heart attacks, strokes, early death, cataracts, and skin wrinkling.\par I will make myself and my partner, friends, and family proud of me.\par I will no longer expose my children and others to the dangers of my second-hand smoke.\par I will have a healthier baby (If you or your partner is pregnant).\par I will have more money to spend.\par I won't have to worry: "When will I get to smoke next?"\par Questions to ask yourself\par To successfully detach from smoking, you will need to identify and address your smoking habits, the true nature of your dependency, and the techniques that work for you. These types of questions can help:\par Do you feel the need to smoke at every meal?\par Are you more of a social smoker?\par Is it a very bad addiction (more than a pack a day)? Or would a simple nicotine patch do the job?\par Is your cigarette smoking linked to other addictions, such as alcohol or gambling?\par Are you open to hypnotherapy and/or acupuncture?\par Are you someone who is open to talking about your addiction with a therapist or counselor?\par Are you interested in getting into a fitness program?\par Take the time to think of what kind of smoker you are, which moments of your life call for a cigarette, and why. This will help you to identify which tips, techniques or therapies may be most beneficial for you. \par Start your stop smoking plan with START\par S = Set a quit date.\par T = Tell family, friends, and co-workers that you plan to quit.\par A = Anticipate and plan for the challenges you'll face while quitting.\par R = Remove cigarettes and other tobacco products from your home, car, and work.\par T = Talk to your doctor about getting help to quit.\par \par After quitting, you may feel dizzy, restless, or even have strong headaches because you’re lacking the immediate release of sugar that comes from nicotine. You may also have a bigger appetite. These sugar-related cravings should only last a few days until your body adjusts so keep your sugar levels a bit higher than usual on those days by drinking plenty of juice (unless you’re a diabetic). It will help prevent the craving symptoms and help your body re-adjust back to normal.\par Tips for managing other cigarette cravings\par Cravings associated with meals\par For some smokers, ending a meal means lighting up, and the prospect of giving that up may appear daunting. TIP: replace that moment after a meal with something such as a piece of fruit, a (healthy) dessert, a square of chocolate, or a stick of gum.\par \par Alcohol and cigarettes\par Many people have a habit of smoking when they have an alcoholic drink. TIP: try non-alcoholic drinks, or try drinking only in bars, restaurant-bars, or friends’ houses where smoking inside is prohibited. Or try snacking on nuts and chips, or chewing on a straw or cocktail stick.\par \par Cravings associated with social smoking\par When friends, family, and co-workers smoke around you, it is doubly difficult to quit or avoid relapse. TIP: Your social circles need to know that you are changing your habits so talk about your decision to quit. Let them know they won’t be able to smoke when you’re in the car with them or taking a coffee break together. \par \par In your workplace, don’t take all your coffee breaks with smokers only, do something else instead, or find non-smokers to have your breaks with.\par Additional tips to deal with cravings and withdrawal symptoms\par Stay active: Keep yourself distracted and occupied, go for walks.\par Keep your hands/fingers busy: Squeeze balls, pencils, or paper clips are good substitutes to satisfy that need for tactile stimulation.\par Keep your mind busy: Read a book or magazine, listen to some music you love.\par Find an oral substitute: Keep other things around to pop in your mouth when you’re craving a cigarette.\par Good choices include mints, hard candy, carrot or celery sticks, gum, and sunflower seeds.\par Drink lots of water: Flushing toxins from your body minimizes withdrawal symptoms and helps cravings pass faster.\par Look for new ways to relax and to cope with depression or anxiety: There are a lot of ways to improve your mood without smoking. \par \par Finding the resources and support to quit smoking\par There are many different methods that have successfully helped people to quit smoking, including:\par Quitting smoking cold turkey.\par Systematically decreasing the number of cigarettes you smoke.\par Reducing your intake of nicotine gradually over time.\par Using nicotine replacement therapy or non-nicotine medications to reduce withdrawal symptoms.\par Utilizing nicotine support groups.\par Trying hypnosis, acupuncture, or counseling using cognitive behavioral techniques.\par You may be successful with the first method you try. More likely, you’ll have to try a number of different methods or a combination of treatments to find the ones that work best for you.\par \par Medication therapy\par Smoking cessation medications can ease withdrawal symptoms and reduce cravings, and are most effective when used as part of a comprehensive stop smoking program monitored by your physician. Talk to your doctor about your options and whether an anti-smoking medication is right for you. U.S. Food and Drug Administration (FDA) approved options are: \par \par Nicotine Replacement Therapy\par Nicotine replacement therapy involves "replacing" cigarettes with other nicotine substitutes, such as nicotine gum or a nicotine patch. It works by delivering small and steady doses of nicotine into the body to relieve some of the withdrawal symptoms without the tars and poisonous gases found in cigarettes. This type of treatment helps smokers focus on breaking their psychological addiction and makes it easier to concentrate on learning new behaviors and coping skills.\par \par Non-Nicotine Medication\par These medications help you stop smoking by reducing cravings and withdrawal symptoms without the use of nicotine. Medications such as bupropion (Zyban) and varenicline (Chantix) are intended for short-term use only.\par \par Non-medication therapies\par There are several things you can do to stop smoking that don’t involve nicotine replacement therapy or prescription medications:\par Hypnosis\par A popular option that has produced good results. Forget anything you may have seen from stage hypnotists, hypnosis works by getting you into a deeply relaxed state where you are open to suggestions that strengthen your resolve to quit smoking and increase your negative feelings toward cigarettes. Ask your doctor to recommend a qualified smoking cessation hypnotherapist in your area or refer to the American Society of Clinical Hypnosis (ASCH) for guidelines on selecting a qualified professional.\par Acupuncture\par One of the oldest known medical techniques, acupuncture is believed to work by triggering the release of endorphins (natural pain relievers) that allow the body to relax. As a smoking cessation aid, acupuncture can be helpful in managing smoking withdrawal symptoms. Ask your doctor for a referral or search for a local practitioner at the American Association of Acupuncture and Cash Medicine (AAAOM).\par Behavioral Therapy\par Nicotine addiction is related to the habitual behaviors (the “rituals”) involved in smoking. Behavior therapy focuses on learning new coping skills and breaking those habits. The American Lung Association offers a free online smoking cessation program that focuses on behavioral change. To find a local behavioral therapist, check with your doctor or search at the Association for Behavioral and Cognitive Therapies (ABCT).\par Motivational Therapies\par Self-help books and websites can provide a number of ways to motivate yourself to quit smoking. One well known example is calculating the monetary savings. Some people have been able to find the motivation to quit just by calculating how much money they will save after they quit. It may be enough to pay for a summer vacation.\par \par \par \par [facility]\par [address]\[place] [address]\par ([phone]_ [phone]\par [email or web address]/find-care/locations/[facility]acco-[facility]\par \par \par WEIGHT GOALS:\par Category				BMI range - kg/m2	BMI Prime\par Very severely underweight		less than 15		less than 0.60	\par Severely underweight			from 15.0 to 16.0		from 0.60 to 0.64	\par Underweight				from 16.0 to 18.5		from 0.64 to 0.74	\par Normal (healthy weight)			from 18.5 to 25		from 0.74 to 1.0	\par Overweight				from 25 to 30		from 1.0 to 1.2	\par Obese Class I (Moderately obese)		from 30 to 35		from 1.2 to 1.4	\par Obese Class II (Severely obese)		from 35 to 40		from 1.4 to 1.6	\par Obese Class III (Very severely obese)	over 40	over 1.6				\par \par Your current weight is 87 lbs (85 lbs on 7/11/18, 81 lbs on 6/21/18; 85 lbs on 1/9/18). Given current weight and height 5'4", your calculated body mass index (BMI) is 15.4 kg/sqm. Normal BMI is 18.5-25 kg/sqm. Thus current weight is in the severely underweight category. Abdominal waist circumference is measured at the level of the umbilicus and is thus not a pants waist measurement. Your current abdominal waist circumference is 27.5. Normal abdominal waist circumference is < 32 inches for women and < 37 inches for men.
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
## 2019-01-17 NOTE — REASON FOR VISIT
[Follow-Up - Clinic] : a clinic follow-up of [Cardiomyopathy] : cardiomyopathy [Hyperlipidemia] : hyperlipidemia [Mitral Regurgitation] : mitral regurgitation [FreeTextEntry1] : isolated elevated BP

## 2019-02-06 ENCOUNTER — MEDICATION RENEWAL (OUTPATIENT)
Age: 64
End: 2019-02-06

## 2019-02-06 ENCOUNTER — APPOINTMENT (OUTPATIENT)
Dept: PAIN MANAGEMENT | Facility: CLINIC | Age: 64
End: 2019-02-06
Payer: MEDICARE

## 2019-02-06 VITALS
BODY MASS INDEX: 15.95 KG/M2 | WEIGHT: 90 LBS | DIASTOLIC BLOOD PRESSURE: 82 MMHG | HEART RATE: 77 BPM | HEIGHT: 63 IN | SYSTOLIC BLOOD PRESSURE: 124 MMHG

## 2019-02-06 PROCEDURE — 99213 OFFICE O/P EST LOW 20 MIN: CPT

## 2019-02-07 NOTE — DISCUSSION/SUMMARY
[Opioids] : Patient was explained in detail about pain control by using opioids. Patient has signed and fully understands our guidelines for medication and drug screening.  Patient understands the side effects of opioids, including, but not limited to, drug tolerance, dependence, potential for addiction. This class of drugs is habit-forming and ELIJAH regulated. The sedative effects of opioids can be potentiated by taking alcohol or any sleeping pills, along with opioids. The decision to drive is patient’s responsibility, as opioids can affect his/her driving ability and ability to concentrate. The long-term place is not clear, however, patient understands that once the pain control optimizes, the goal will be to wean off the opioids. All the issues regarding opioid treatment have been addressed satisfactorily.  [FreeTextEntry1] : Exam nonfocal. \par Her condition remains stable but active.\par \par We discussed how she is going to start the medical marrijuanna tapering up her dosage as needed.  She will start once things calm down with her sick friend.  She expressed understanding of this.  \par \par  Istop checked # [id number].  No signs of toxicity noted.  Will continue to monitor. Her rxns were renewed.  Risks of long term opioid use reviewed with the patient. \par \par Discussed with the patient smoking cessation. \par \par UDS sent today\par \par She appears to be using medications reasonably and responsibly.  She denies side effects to medications-bm regular.  Her opiate agreement was reviewed and we discussed locked box for medications. \par \par She is to follow up with her epilepsy specialist-Dr. Hernandez, gastro, and oncologist.  \par \par She will RTO in one months time or sooner if clinically indicated.

## 2019-02-07 NOTE — HISTORY OF PRESENT ILLNESS
[FreeTextEntry1] : She returned today for a follow up ov.  She reports that she did get the medical marijuanna however she hasn't been using it because she has been helping her sick friend and is afraid to start something new.  She continues on hydrocodone with some relief, both improvement in pain and activity levels. We discussed today that she will not start to decrease hydrocodone until she notices benefit from the medical marijuanna.    \par \par Her headaches have been fairly stable- no increase intensity or frequency. \par \par She walks daily for exercise.  She does not drive.  She takes public transportation.  She took access a ride here today.\par \par VAS=5-6/10 with medications this past month.   VAS=9-10/10 without medications. \par \par She follows with the epilepsy MDs for her seizures.  She needs her klonopin rxn refilled today. I will send the eplepsy NP a message for the renewal. \par \par She denies depression and suicidal ideations upon full questioning.  \par \par Patient encourage to quit smoking again today. \par \par No other medical complaints.

## 2019-03-06 ENCOUNTER — MEDICATION RENEWAL (OUTPATIENT)
Age: 64
End: 2019-03-06

## 2019-03-21 ENCOUNTER — APPOINTMENT (OUTPATIENT)
Dept: INTERNAL MEDICINE | Facility: CLINIC | Age: 64
End: 2019-03-21
Payer: MEDICARE

## 2019-03-21 VITALS
DIASTOLIC BLOOD PRESSURE: 80 MMHG | BODY MASS INDEX: 16.3 KG/M2 | OXYGEN SATURATION: 97 % | HEART RATE: 73 BPM | WEIGHT: 92 LBS | SYSTOLIC BLOOD PRESSURE: 150 MMHG | HEIGHT: 63 IN

## 2019-03-21 PROCEDURE — 99214 OFFICE O/P EST MOD 30 MIN: CPT

## 2019-03-21 NOTE — HISTORY OF PRESENT ILLNESS
[FreeTextEntry1] : pt is here for f/u  [de-identified] : 1) low body weight \par - somewhat improved \par - reports that she has been eating well\par - GI eval CT chest - negative \par \par 2) Seizures \par - not controlled\par - has had sx / last seizure was last week \par - not driving \par - follows w/ neuro\par \par 3) OP \par - not getting Prolia shot bc she is getting dental work\par \par 4) pain \par - ch migraines / back pain \par - on narcotics / follows pain management \par - she is being switched to medical marijuana\par \par 5) h/o breast cancer \par - s/p sx , chemo , RT and hormonal tx \par - needs mammo in 5 /19 \par

## 2019-03-21 NOTE — REVIEW OF SYSTEMS
[Back Pain] : back pain [Headache] : headache [Negative] : Respiratory [de-identified] : seizures

## 2019-03-21 NOTE — PHYSICAL EXAM
[No Acute Distress] : no acute distress [PERRL] : pupils equal round and reactive to light [Normal Sclera/Conjunctiva] : normal sclera/conjunctiva [Normal Outer Ear/Nose] : the outer ears and nose were normal in appearance [Normal Oropharynx] : the oropharynx was normal [No JVD] : no jugular venous distention [Supple] : supple [No Lymphadenopathy] : no lymphadenopathy [No Respiratory Distress] : no respiratory distress  [Clear to Auscultation] : lungs were clear to auscultation bilaterally [No Accessory Muscle Use] : no accessory muscle use [Normal Rate] : normal rate  [Regular Rhythm] : with a regular rhythm [Normal S1, S2] : normal S1 and S2 [Soft] : abdomen soft [Non Tender] : non-tender [No HSM] : no HSM [Normal Bowel Sounds] : normal bowel sounds [Normal Supraclavicular Nodes] : no supraclavicular lymphadenopathy [Normal Posterior Cervical Nodes] : no posterior cervical lymphadenopathy [Normal Anterior Cervical Nodes] : no anterior cervical lymphadenopathy

## 2019-03-26 ENCOUNTER — OTHER (OUTPATIENT)
Age: 64
End: 2019-03-26

## 2019-03-26 ENCOUNTER — APPOINTMENT (OUTPATIENT)
Dept: NEUROLOGY | Facility: CLINIC | Age: 64
End: 2019-03-26
Payer: MEDICARE

## 2019-03-26 VITALS
HEART RATE: 103 BPM | SYSTOLIC BLOOD PRESSURE: 134 MMHG | DIASTOLIC BLOOD PRESSURE: 83 MMHG | BODY MASS INDEX: 16.3 KG/M2 | HEIGHT: 63 IN | WEIGHT: 92 LBS

## 2019-03-26 PROCEDURE — 99215 OFFICE O/P EST HI 40 MIN: CPT

## 2019-03-26 NOTE — REVIEW OF SYSTEMS
[Feeling Poorly] : feeling poorly [Feeling Tired] : feeling tired [Confused or Disoriented] : confusion [Decr. Concentrating Ability] : decreased concentrating ability [Seizures] : convulsions [Negative] : Heme/Lymph [Difficulty with Language] : no ~M difficulty with language [Changed Thought Patterns] : no change in thought patterns [Dizziness] : no dizziness [Fainting] : no fainting [Lightheadedness] : no lightheadedness [Vertigo] : no vertigo

## 2019-03-26 NOTE — LETTER BODY
[To Whom it May Concern:] : To Whom it May Concern: [FreeTextEntry1] : Misty Tello is a patient in my practice with a history of epilepsy.  She can undergo dental procedure and get dentures. [FreeTextEntry3] : Claude Hernandez MD
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
## 2019-03-26 NOTE — PHYSICAL EXAM
[General Appearance - Alert] : alert [General Appearance - In No Acute Distress] : in no acute distress [Person] : oriented to person [Place] : oriented to place [Time] : oriented to time [Short Term Intact] : short term memory intact [Remote Intact] : remote memory intact [Registration Intact] : recent registration memory intact [Span Intact] : the attention span was normal [Concentration Intact] : normal concentrating ability [Visual Intact] : visual attention was ~T not ~L decreased [Naming Objects] : no difficulty naming common objects [Repeating Phrases] : no difficulty repeating a phrase [Writing A Sentence] : no difficulty writing a sentence [Fluency] : fluency intact [Comprehension] : comprehension intact [Reading] : reading intact [Current Events] : adequate knowledge of current events [Past History] : adequate knowledge of personal past history [Vocabulary] : adequate range of vocabulary [Cranial Nerves Optic (II)] : visual acuity intact bilaterally,  visual fields full to confrontation, pupils equal round and reactive to light [Cranial Nerves Oculomotor (III)] : extraocular motion intact [Cranial Nerves Trigeminal (V)] : facial sensation intact symmetrically [Cranial Nerves Facial (VII)] : face symmetrical [Cranial Nerves Vestibulocochlear (VIII)] : hearing was intact bilaterally [Cranial Nerves Glossopharyngeal (IX)] : tongue and palate midline [Cranial Nerves Accessory (XI - Cranial And Spinal)] : head turning and shoulder shrug symmetric [Cranial Nerves Hypoglossal (XII)] : there was no tongue deviation with protrusion [Motor Strength] : muscle strength was normal in all four extremities [No Muscle Atrophy] : normal bulk in all four extremities [Sensation Tactile Decrease] : light touch was intact [Sensation Pain / Temperature Decrease] : pain and temperature was intact [Sensation Vibration Decrease] : vibration was intact [Proprioception] : proprioception was intact [Balance] : balance was intact [2+] : Ankle jerk left 2+ [Sclera] : the sclera and conjunctiva were normal [PERRL With Normal Accommodation] : pupils were equal in size, round, reactive to light, with normal accommodation [Extraocular Movements] : extraocular movements were intact [Neck Appearance] : the appearance of the neck was normal [Arterial Pulses Carotid] : carotid pulses were normal with no bruits [Full Pulse] : the pedal pulses are present [Edema] : there was no peripheral edema [Bowel Sounds] : normal bowel sounds [No CVA Tenderness] : no ~M costovertebral angle tenderness [Abnormal Walk] : normal gait [Nail Clubbing] : no clubbing  or cyanosis of the fingernails [Musculoskeletal - Swelling] : no joint swelling seen [Motor Tone] : muscle strength and tone were normal [Skin Color & Pigmentation] : normal skin color and pigmentation [Skin Turgor] : normal skin turgor [] : no rash [Past-pointing] : there was no past-pointing [Tremor] : no tremor present [Plantar Reflex Right Only] : normal on the right [Plantar Reflex Left Only] : normal on the left [FreeTextEntry1] : Increased pain left arm with limited ROM

## 2019-03-26 NOTE — DISCUSSION/SUMMARY
[Medically Refractory (seizure within the last year)] : Medically Refractory (seizure within the last year) [Complex Partial] : complex partial [Symptomatic] : symptomatic [Risks Associated with Driving/[place] Law] : As per my usual protocol, the patient was advised in regards to risks and driving privileges associated with the [place] Guidelines.  [Safety Recommendations] : The patient was advised in regards to the risk of seizures and general seizure safety recommendations including not to be bathing alone, climbing to high places and operating heavy machinery. [Compliance with Medications] : The importance of compliance with medications was reinforced. [Bone Health Education] : Patient was educated in regards to bone health and an increased risk of osteoporosis in patients with epilepsy. [Sleep Hygiene/Sleep Disruption Risks] : Sleep hygiene and the risks of sleep disruption were discussed. [Surgical Options/Risks/Benefits] : Surgical options/risks/benefits for intractable epilepsy were discussed. [Risk of Death] : Risk of death associated with seizures / SUDEP was discussed. [Opioids] : Patient was explained in detail about pain control by using opioids. Patient has signed and fully understands our guidelines for medication and drug screening.  Patient understands the side effects of opioids, including, but not limited to, drug tolerance, dependence, potential for addiction. This class of drugs is habit-forming and ELIJAH regulated. The sedative effects of opioids can be potentiated by taking alcohol or any sleeping pills, along with opioids. The decision to drive is patient’s responsibility, as opioids can affect his/her driving ability and ability to concentrate. The long-term place is not clear, however, patient understands that once the pain control optimizes, the goal will be to wean off the opioids. All the issues regarding opioid treatment have been addressed satisfactorily.  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  We discussed the possibility of epilepsy sx in detail.  She had neuropsych testing which showed diffuse cerebral dysfunction, right greater than left.\par \par -We discussed her medications at great length once again.  I explained the potential ARDs of CBZ and Onfi. We decided to continue Onfi at 40mg bid (maximum dose).  We will  continue her on CBZ 200mg tid.  Onfi maximum dose in adults is 80mg daily.  However, at this point, not approved by insurance for Onfi.  I called the pharmacy last visit since clobazam is now generic, but would still be $300/month which she can't afford.\par \par We will substitute Klonopin 0.5mg in AM, 0.5mg in the afternoon and 1mg in PM until approved.  She knows to only take Onfi or Klonopin.  Once Onfi cheaper will put her back on Onfi and stop Klonopin.  We gave resources for Onfi support program and [employer] today.\par \par We also had a discussion once again about VNS and how it has been shown to decrease seizures by 50% in 50% of patients.  She discussed VNS in greater detail with neurosurgery.  However, she is still unsure at this time. Also, tough with history of cancer if needed MRIs.\par \par Patient also with osteoporosis.  Discussed risks of CBZ in osteoporosis, but risk of stopping medication high in increasing her seizures.  She knows risk of fracture at this time and is being treated by her PMD. \par \par Patient seen for 40 min face to face with >50% of the time in discussion and coordination of care.

## 2019-04-03 ENCOUNTER — APPOINTMENT (OUTPATIENT)
Dept: PAIN MANAGEMENT | Facility: CLINIC | Age: 64
End: 2019-04-03
Payer: MEDICARE

## 2019-04-03 VITALS
BODY MASS INDEX: 16.3 KG/M2 | DIASTOLIC BLOOD PRESSURE: 87 MMHG | SYSTOLIC BLOOD PRESSURE: 140 MMHG | HEIGHT: 63 IN | HEART RATE: 81 BPM | WEIGHT: 92 LBS

## 2019-04-03 PROCEDURE — 99213 OFFICE O/P EST LOW 20 MIN: CPT

## 2019-04-03 NOTE — HISTORY REVIEWED
[History reviewed] : History reviewed. [Medications and Allergies reviewed] : Medications and allergies reviewed. 195.9

## 2019-04-08 NOTE — HISTORY OF PRESENT ILLNESS
[FreeTextEntry1] : The patient returned today for treatment of her chronic pain.  She continues to do reasonably well on her current pain medication regimen.  She states that she tried the medical marijuanna a couple of times and it did help some.  She however has not been consistent.  She has not tapered down her hydrocodone. VAS=5-6/10 with medications this past month. \par \par Her headaches have been fairly stable- no increase intensity or frequency. \par \par She walks daily for exercise.  She does not drive-public transportation.  She reports that she gets out frequently with her friends and goes shopping.  \par \par She follows with the epilepsy MDs for her seizures.  They are going to get her restarted on onfi. Per her report her seizures were better controlled with the onfi.  \par \par She denies depression and suicidal ideations upon full questioning.  \par \par Patient encouraged to quit smoking again today. \par \par No other medical complaints.

## 2019-04-08 NOTE — DISCUSSION/SUMMARY
[Opioids] : Patient was explained in detail about pain control by using opioids. Patient has signed and fully understands our guidelines for medication and drug screening.  Patient understands the side effects of opioids, including, but not limited to, drug tolerance, dependence, potential for addiction. This class of drugs is habit-forming and ELIJAH regulated. The sedative effects of opioids can be potentiated by taking alcohol or any sleeping pills, along with opioids. The decision to drive is patient’s responsibility, as opioids can affect his/her driving ability and ability to concentrate. The long-term place is not clear, however, patient understands that once the pain control optimizes, the goal will be to wean off the opioids. All the issues regarding opioid treatment have been addressed satisfactorily.  [FreeTextEntry1] : Exam nonfocal. \par Her condition remains stable but active.\par \par We discussed her taking the medical marijuanna more frequently, at least at night (around dinner). She was reminded not to stop the hydrocodone abruptly.  She is to wean down by 1 tablet every 1-2 weeks if relief from medical marijuanna.  She expressed understanding of this.  \par \par Istop checked, #[id number]. No signs of toxicity noted.  Will continue to monitor. Her medications were renewed.  The risks of long term opioid use reviewed with the patient, as well as alternative treatment options.  \par \par Discussed with the patient smoking cessation again today. \par \par She appears to be using medications reasonably and responsibly.  She denies side effects to medications-bm regular.  Her opiate agreement was reviewed and we discussed locked box for medications. \par \par She is to follow up with her epilepsy specialist-Dr. Hernandez, gastro, and oncologist.  \par \par She will RTO in one-two months time or sooner if clinically indicated.

## 2019-04-16 ENCOUNTER — MEDICATION RENEWAL (OUTPATIENT)
Age: 64
End: 2019-04-16

## 2019-04-24 ENCOUNTER — MEDICATION RENEWAL (OUTPATIENT)
Age: 64
End: 2019-04-24

## 2019-04-24 ENCOUNTER — APPOINTMENT (OUTPATIENT)
Dept: NEUROLOGY | Facility: CLINIC | Age: 64
End: 2019-04-24
Payer: MEDICARE

## 2019-04-24 VITALS
SYSTOLIC BLOOD PRESSURE: 127 MMHG | WEIGHT: 95 LBS | BODY MASS INDEX: 16.83 KG/M2 | HEART RATE: 70 BPM | HEIGHT: 63 IN | DIASTOLIC BLOOD PRESSURE: 77 MMHG

## 2019-04-24 PROCEDURE — 99214 OFFICE O/P EST MOD 30 MIN: CPT

## 2019-04-25 NOTE — DISCUSSION/SUMMARY
[Medically Refractory (seizure within the last year)] : Medically Refractory (seizure within the last year) [Complex Partial] : complex partial [Symptomatic] : symptomatic [Risks Associated with Driving/[place] Law] : As per my usual protocol, the patient was advised in regards to risks and driving privileges associated with the [place] Guidelines.  [Safety Recommendations] : The patient was advised in regards to the risk of seizures and general seizure safety recommendations including not to be bathing alone, climbing to high places and operating heavy machinery. [Bone Health Education] : Patient was educated in regards to bone health and an increased risk of osteoporosis in patients with epilepsy. [Compliance with Medications] : The importance of compliance with medications was reinforced. [Sleep Hygiene/Sleep Disruption Risks] : Sleep hygiene and the risks of sleep disruption were discussed. [Surgical Options/Risks/Benefits] : Surgical options/risks/benefits for intractable epilepsy were discussed. [Risk of Death] : Risk of death associated with seizures / SUDEP was discussed. [Opioids] : Patient was explained in detail about pain control by using opioids. Patient has signed and fully understands our guidelines for medication and drug screening.  Patient understands the side effects of opioids, including, but not limited to, drug tolerance, dependence, potential for addiction. This class of drugs is habit-forming and [place] regulated. The sedative effects of opioids can be potentiated by taking alcohol or any sleeping pills, along with opioids. The decision to drive is patient’s responsibility, as opioids can affect his/her driving ability and ability to concentrate. The long-term place is not clear, however, patient understands that once the pain control optimizes, the goal will be to wean off the opioids. All the issues regarding opioid treatment have been addressed satisfactorily.  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  We discussed the possibility of epilepsy sx in detail.  She had neuropsych testing which showed diffuse cerebral dysfunction, right greater than left.\par \par Plan:\par -Advised to restart  Klonopin 0.5mg in AM, 0.5mg in the afternoon and 1mg in PM until approved.  She  knows to only take Onfi or Klonopin.  Once Onfi cheaper will put her back on Onfi and stop Klonopin.  We  gave resources for Onfi support program and [employer] today.\par -Patient also with osteoporosis.  Discussed risks of CBZ in osteoporosis, but risk of stopping medication high in increasing her seizures.  She knows risk of fracture at this time and is being treated by her PMD. \par - encouraged 3 servings Ca+ in diet and low weight bearing exercises\par - reviewed seizure triggers\par -follow up in 3-4 months with Dr Hernandez\par \par .

## 2019-04-25 NOTE — PHYSICAL EXAM
[General Appearance - In No Acute Distress] : in no acute distress [General Appearance - Alert] : alert [Person] : oriented to person [Place] : oriented to place [Short Term Intact] : short term memory intact [Time] : oriented to time [Registration Intact] : recent registration memory intact [Remote Intact] : remote memory intact [Span Intact] : the attention span was normal [Concentration Intact] : normal concentrating ability [Visual Intact] : visual attention was ~T not ~L decreased [Repeating Phrases] : no difficulty repeating a phrase [Naming Objects] : no difficulty naming common objects [Writing A Sentence] : no difficulty writing a sentence [Fluency] : fluency intact [Comprehension] : comprehension intact [Reading] : reading intact [Current Events] : adequate knowledge of current events [Vocabulary] : adequate range of vocabulary [Past History] : adequate knowledge of personal past history [Cranial Nerves Optic (II)] : visual acuity intact bilaterally,  visual fields full to confrontation, pupils equal round and reactive to light [Cranial Nerves Oculomotor (III)] : extraocular motion intact [Cranial Nerves Facial (VII)] : face symmetrical [Cranial Nerves Trigeminal (V)] : facial sensation intact symmetrically [Cranial Nerves Glossopharyngeal (IX)] : tongue and palate midline [Cranial Nerves Vestibulocochlear (VIII)] : hearing was intact bilaterally [Cranial Nerves Accessory (XI - Cranial And Spinal)] : head turning and shoulder shrug symmetric [Cranial Nerves Hypoglossal (XII)] : there was no tongue deviation with protrusion [Motor Strength] : muscle strength was normal in all four extremities [No Muscle Atrophy] : normal bulk in all four extremities [Sensation Pain / Temperature Decrease] : pain and temperature was intact [Sensation Tactile Decrease] : light touch was intact [Proprioception] : proprioception was intact [Sensation Vibration Decrease] : vibration was intact [Balance] : balance was intact [2+] : Ankle jerk left 2+ [Sclera] : the sclera and conjunctiva were normal [PERRL With Normal Accommodation] : pupils were equal in size, round, reactive to light, with normal accommodation [Neck Appearance] : the appearance of the neck was normal [Extraocular Movements] : extraocular movements were intact [Arterial Pulses Carotid] : carotid pulses were normal with no bruits [Full Pulse] : the pedal pulses are present [Bowel Sounds] : normal bowel sounds [Edema] : there was no peripheral edema [Abnormal Walk] : normal gait [No CVA Tenderness] : no ~M costovertebral angle tenderness [Nail Clubbing] : no clubbing  or cyanosis of the fingernails [Motor Tone] : muscle strength and tone were normal [Musculoskeletal - Swelling] : no joint swelling seen [Skin Color & Pigmentation] : normal skin color and pigmentation [Skin Turgor] : normal skin turgor [] : no rash [Past-pointing] : there was no past-pointing [Tremor] : no tremor present [Plantar Reflex Right Only] : normal on the right [Plantar Reflex Left Only] : normal on the left [FreeTextEntry1] : Increased pain left arm with limited ROM

## 2019-04-25 NOTE — REVIEW OF SYSTEMS
[Feeling Poorly] : feeling poorly [Feeling Tired] : feeling tired [Confused or Disoriented] : confusion [Decr. Concentrating Ability] : decreased concentrating ability [Seizures] : convulsions [Negative] : Heme/Lymph [Changed Thought Patterns] : no change in thought patterns [Difficulty with Language] : no ~M difficulty with language [Dizziness] : no dizziness [Fainting] : no fainting [Lightheadedness] : no lightheadedness [Vertigo] : no vertigo
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
## 2019-05-01 ENCOUNTER — FORM ENCOUNTER (OUTPATIENT)
Age: 64
End: 2019-05-01

## 2019-05-01 ENCOUNTER — APPOINTMENT (OUTPATIENT)
Dept: PAIN MANAGEMENT | Facility: CLINIC | Age: 64
End: 2019-05-01
Payer: MEDICARE

## 2019-05-01 VITALS
HEART RATE: 69 BPM | WEIGHT: 95 LBS | HEIGHT: 55 IN | SYSTOLIC BLOOD PRESSURE: 122 MMHG | DIASTOLIC BLOOD PRESSURE: 68 MMHG | BODY MASS INDEX: 21.98 KG/M2

## 2019-05-01 PROCEDURE — 99213 OFFICE O/P EST LOW 20 MIN: CPT

## 2019-05-01 NOTE — PHYSICAL EXAM
[General Appearance - Alert] : alert [Person] : oriented to person [Motor Strength] : muscle strength was normal in all four extremities [Place] : oriented to place [Motor Tone] : muscle tone was normal in all four extremities [Balance] : balance was intact [Abnormal Walk] : normal gait [Sclera] : the sclera and conjunctiva were normal [Neck Appearance] : the appearance of the neck was normal [Bowel Sounds] : normal bowel sounds [Arterial Pulses Carotid] : carotid pulses were normal with no bruits [Musculoskeletal - Swelling] : no joint swelling seen [No CVA Tenderness] : no ~M costovertebral angle tenderness [Skin Turgor] : normal skin turgor [Skin Color & Pigmentation] : normal skin color and pigmentation [] : no rash

## 2019-05-02 ENCOUNTER — OUTPATIENT (OUTPATIENT)
Dept: OUTPATIENT SERVICES | Facility: HOSPITAL | Age: 64
LOS: 1 days | End: 2019-05-02
Payer: COMMERCIAL

## 2019-05-02 ENCOUNTER — APPOINTMENT (OUTPATIENT)
Dept: MRI IMAGING | Facility: IMAGING CENTER | Age: 64
End: 2019-05-02
Payer: MEDICARE

## 2019-05-02 DIAGNOSIS — E27.9 DISORDER OF ADRENAL GLAND, UNSPECIFIED: ICD-10-CM

## 2019-05-02 PROCEDURE — 74183 MRI ABD W/O CNTR FLWD CNTR: CPT | Mod: 26

## 2019-05-02 PROCEDURE — A9585: CPT

## 2019-05-02 PROCEDURE — 74183 MRI ABD W/O CNTR FLWD CNTR: CPT

## 2019-05-02 NOTE — HISTORY OF PRESENT ILLNESS
[FreeTextEntry1] : The patient returned today for a follow up ov.  She took medical transportation to her appt.  She has been fairly stable and doing well on her current treatment regimen.  She continues to report both decrease pain and increase functioning.  VAS=5/10 with medications.  She also uses medical marijuanna for additional relief.  \par \par Her headaches have been fairly stable- no increase intensity or frequency. \par \par She walks daily for exercise.  She reports that she gets out frequently with her friends and goes shopping.  She does not drive.   \par \par She follows with the epilepsy MDs for her seizures.  They are trying to obtain onfi patient assistance. Per her report her seizures were better controlled with the onfi.  \par \par She denies depression and suicidal ideations upon full questioning.  \par \par No other medical complaints.

## 2019-05-02 NOTE — DISCUSSION/SUMMARY
[Opioids] : Patient was explained in detail about pain control by using opioids. Patient has signed and fully understands our guidelines for medication and drug screening.  Patient understands the side effects of opioids, including, but not limited to, drug tolerance, dependence, potential for addiction. This class of drugs is habit-forming and [place] regulated. The sedative effects of opioids can be potentiated by taking alcohol or any sleeping pills, along with opioids. The decision to drive is patient’s responsibility, as opioids can affect his/her driving ability and ability to concentrate. The long-term place is not clear, however, patient understands that once the pain control optimizes, the goal will be to wean off the opioids. All the issues regarding opioid treatment have been addressed satisfactorily.  [FreeTextEntry1] : Exam nonfocal. \par Her condition remains stable but active.\par \par Istop checked (#[id number]). No signs of toxicity noted.  Will continue to monitor. Her medications were renewed.  The risks of long term opioid use reviewed with the patient, as well as alternative treatment options.  \par \par Discussed with the patient smoking cessation again today. \par \par She appears to be using medications reasonably and responsibly.  She denies side effects to medications-bm regular.  Her opiate agreement was reviewed and we discussed locked box for medications. \par \par She is to follow up with her epilepsy specialist-Dr. Hernandez, gastro, and oncologist.  \par \par She will RTO in one-two months time or sooner if clinically indicated.

## 2019-05-09 ENCOUNTER — RX CHANGE (OUTPATIENT)
Age: 64
End: 2019-05-09

## 2019-05-10 ENCOUNTER — OTHER (OUTPATIENT)
Age: 64
End: 2019-05-10

## 2019-05-21 ENCOUNTER — APPOINTMENT (OUTPATIENT)
Dept: NEUROLOGY | Facility: CLINIC | Age: 64
End: 2019-05-21
Payer: MEDICARE

## 2019-05-21 VITALS
HEIGHT: 63 IN | DIASTOLIC BLOOD PRESSURE: 82 MMHG | SYSTOLIC BLOOD PRESSURE: 122 MMHG | WEIGHT: 94 LBS | HEART RATE: 82 BPM | BODY MASS INDEX: 16.66 KG/M2

## 2019-05-21 PROCEDURE — 99215 OFFICE O/P EST HI 40 MIN: CPT

## 2019-05-21 RX ORDER — CLOBAZAM 20 MG/1
20 TABLET ORAL
Qty: 120 | Refills: 0 | Status: DISCONTINUED | COMMUNITY
Start: 2019-04-24 | End: 2019-05-21
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
## 2019-05-21 NOTE — PHYSICAL EXAM
[General Appearance - Alert] : alert [General Appearance - In No Acute Distress] : in no acute distress [Person] : oriented to person [Place] : oriented to place [Time] : oriented to time [Short Term Intact] : short term memory intact [Remote Intact] : remote memory intact [Registration Intact] : recent registration memory intact [Span Intact] : the attention span was normal [Concentration Intact] : normal concentrating ability [Visual Intact] : visual attention was ~T not ~L decreased [Naming Objects] : no difficulty naming common objects [Repeating Phrases] : no difficulty repeating a phrase [Writing A Sentence] : no difficulty writing a sentence [Fluency] : fluency intact [Comprehension] : comprehension intact [Reading] : reading intact [Current Events] : adequate knowledge of current events [Past History] : adequate knowledge of personal past history [Vocabulary] : adequate range of vocabulary [Cranial Nerves Optic (II)] : visual acuity intact bilaterally,  visual fields full to confrontation, pupils equal round and reactive to light [Cranial Nerves Oculomotor (III)] : extraocular motion intact [Cranial Nerves Trigeminal (V)] : facial sensation intact symmetrically [Cranial Nerves Facial (VII)] : face symmetrical [Cranial Nerves Vestibulocochlear (VIII)] : hearing was intact bilaterally [Cranial Nerves Glossopharyngeal (IX)] : tongue and palate midline [Cranial Nerves Accessory (XI - Cranial And Spinal)] : head turning and shoulder shrug symmetric [Cranial Nerves Hypoglossal (XII)] : there was no tongue deviation with protrusion [Motor Strength] : muscle strength was normal in all four extremities [No Muscle Atrophy] : normal bulk in all four extremities [Sensation Tactile Decrease] : light touch was intact [Sensation Pain / Temperature Decrease] : pain and temperature was intact [Sensation Vibration Decrease] : vibration was intact [Proprioception] : proprioception was intact [Balance] : balance was intact [Past-pointing] : there was no past-pointing [Tremor] : no tremor present [2+] : Ankle jerk left 2+ [Plantar Reflex Right Only] : normal on the right [Plantar Reflex Left Only] : normal on the left [Sclera] : the sclera and conjunctiva were normal [PERRL With Normal Accommodation] : pupils were equal in size, round, reactive to light, with normal accommodation [Extraocular Movements] : extraocular movements were intact [Neck Appearance] : the appearance of the neck was normal [Arterial Pulses Carotid] : carotid pulses were normal with no bruits [Full Pulse] : the pedal pulses are present [Edema] : there was no peripheral edema [Bowel Sounds] : normal bowel sounds [No CVA Tenderness] : no ~M costovertebral angle tenderness [Abnormal Walk] : normal gait [Nail Clubbing] : no clubbing  or cyanosis of the fingernails [Musculoskeletal - Swelling] : no joint swelling seen [Motor Tone] : muscle strength and tone were normal [FreeTextEntry1] : Increased pain left arm with limited ROM [Skin Color & Pigmentation] : normal skin color and pigmentation [Skin Turgor] : normal skin turgor [] : no rash

## 2019-05-21 NOTE — REVIEW OF SYSTEMS
[Feeling Poorly] : feeling poorly [Feeling Tired] : feeling tired [Confused or Disoriented] : confusion [Decr. Concentrating Ability] : decreased concentrating ability [Difficulty with Language] : no ~M difficulty with language [Changed Thought Patterns] : no change in thought patterns [Seizures] : convulsions [Dizziness] : no dizziness [Fainting] : no fainting [Lightheadedness] : no lightheadedness [Vertigo] : no vertigo [Negative] : Heme/Lymph

## 2019-05-21 NOTE — DISCUSSION/SUMMARY
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  We discussed the possibility of epilepsy sx in detail.  She had neuropsych testing which showed diffuse cerebral dysfunction, right greater than left.\par \par Plan:\par -Tried [facility] to get Onfi from [facility] for ~100/mo.  Will take Onfi 20mg bid with Klonopin 0.5mg bid.  If able to get approval would change her back to Onfi 40mg bid.  We wrote down the dosing for her today.\par -Patient also with osteoporosis.  Discussed risks of CBZ in osteoporosis, but risk of stopping medication high in increasing her seizures.  She knows risk of fracture at this time and is being treated by her PMD. \par - encouraged 3 servings Ca+ in diet and low weight bearing exercises\par - reviewed seizure triggers\par -follow up in 2-3 months.\par \par . [Medically Refractory (seizure within the last year)] : Medically Refractory (seizure within the last year) [Complex Partial] : complex partial [Symptomatic] : symptomatic [Risks Associated with Driving/[place] Law] : As per my usual protocol, the patient was advised in regards to risks and driving privileges associated with the [place].  [Safety Recommendations] : The patient was advised in regards to the risk of seizures and general seizure safety recommendations including not to be bathing alone, climbing to high places and operating heavy machinery. [Compliance with Medications] : The importance of compliance with medications was reinforced. [Bone Health Education] : Patient was educated in regards to bone health and an increased risk of osteoporosis in patients with epilepsy. [Sleep Hygiene/Sleep Disruption Risks] : Sleep hygiene and the risks of sleep disruption were discussed. [Surgical Options/Risks/Benefits] : Surgical options/risks/benefits for intractable epilepsy were discussed. [Risk of Death] : Risk of death associated with seizures / SUDEP was discussed. [Opioids] : Patient was explained in detail about pain control by using opioids. Patient has signed and fully understands our guidelines for medication and drug screening.  Patient understands the side effects of opioids, including, but not limited to, drug tolerance, dependence, potential for addiction. This class of drugs is habit-forming and [place] regulated. The sedative effects of opioids can be potentiated by taking alcohol or any sleeping pills, along with opioids. The decision to drive is patient’s responsibility, as opioids can affect his/her driving ability and ability to concentrate. The long-term place is not clear, however, patient understands that once the pain control optimizes, the goal will be to wean off the opioids. All the issues regarding opioid treatment have been addressed satisfactorily.

## 2019-05-29 ENCOUNTER — APPOINTMENT (OUTPATIENT)
Dept: PAIN MANAGEMENT | Facility: CLINIC | Age: 64
End: 2019-05-29

## 2019-05-29 ENCOUNTER — MESSAGE (OUTPATIENT)
Age: 64
End: 2019-05-29

## 2019-05-29 ENCOUNTER — MEDICATION RENEWAL (OUTPATIENT)
Age: 64
End: 2019-05-29

## 2019-06-03 ENCOUNTER — FORM ENCOUNTER (OUTPATIENT)
Age: 64
End: 2019-06-03

## 2019-06-04 ENCOUNTER — APPOINTMENT (OUTPATIENT)
Dept: MAMMOGRAPHY | Facility: IMAGING CENTER | Age: 64
End: 2019-06-04
Payer: MEDICARE

## 2019-06-04 ENCOUNTER — OUTPATIENT (OUTPATIENT)
Dept: OUTPATIENT SERVICES | Facility: HOSPITAL | Age: 64
LOS: 1 days | End: 2019-06-04
Payer: COMMERCIAL

## 2019-06-04 ENCOUNTER — APPOINTMENT (OUTPATIENT)
Dept: ULTRASOUND IMAGING | Facility: IMAGING CENTER | Age: 64
End: 2019-06-04
Payer: MEDICARE

## 2019-06-04 DIAGNOSIS — E27.9 DISORDER OF ADRENAL GLAND, UNSPECIFIED: ICD-10-CM

## 2019-06-04 PROCEDURE — 77063 BREAST TOMOSYNTHESIS BI: CPT | Mod: 26

## 2019-06-04 PROCEDURE — 76641 ULTRASOUND BREAST COMPLETE: CPT | Mod: 26,50

## 2019-06-04 PROCEDURE — 77067 SCR MAMMO BI INCL CAD: CPT

## 2019-06-04 PROCEDURE — 77067 SCR MAMMO BI INCL CAD: CPT | Mod: 26

## 2019-06-04 PROCEDURE — 76641 ULTRASOUND BREAST COMPLETE: CPT

## 2019-06-04 PROCEDURE — 77063 BREAST TOMOSYNTHESIS BI: CPT

## 2019-06-08 ENCOUNTER — EMERGENCY (EMERGENCY)
Facility: HOSPITAL | Age: 64
LOS: 1 days | Discharge: HOME | End: 2019-06-08
Attending: EMERGENCY MEDICINE | Admitting: EMERGENCY MEDICINE
Payer: MEDICARE

## 2019-06-08 ENCOUNTER — OTHER (OUTPATIENT)
Age: 64
End: 2019-06-08

## 2019-06-08 VITALS
TEMPERATURE: 99 F | OXYGEN SATURATION: 95 % | HEART RATE: 84 BPM | SYSTOLIC BLOOD PRESSURE: 114 MMHG | RESPIRATION RATE: 16 BRPM | DIASTOLIC BLOOD PRESSURE: 77 MMHG

## 2019-06-08 VITALS
DIASTOLIC BLOOD PRESSURE: 56 MMHG | HEART RATE: 72 BPM | SYSTOLIC BLOOD PRESSURE: 139 MMHG | RESPIRATION RATE: 16 BRPM | OXYGEN SATURATION: 99 %

## 2019-06-08 LAB
ALBUMIN SERPL ELPH-MCNC: 4 G/DL — SIGNIFICANT CHANGE UP (ref 3.3–5)
ALP SERPL-CCNC: 81 U/L — SIGNIFICANT CHANGE UP (ref 40–120)
ALT FLD-CCNC: 12 U/L — SIGNIFICANT CHANGE UP (ref 4–33)
ANION GAP SERPL CALC-SCNC: 12 MMO/L — SIGNIFICANT CHANGE UP (ref 7–14)
AST SERPL-CCNC: 20 U/L — SIGNIFICANT CHANGE UP (ref 4–32)
BASE EXCESS BLDV CALC-SCNC: 2.6 MMOL/L — SIGNIFICANT CHANGE UP
BASOPHILS # BLD AUTO: 0.05 K/UL — SIGNIFICANT CHANGE UP (ref 0–0.2)
BASOPHILS NFR BLD AUTO: 0.5 % — SIGNIFICANT CHANGE UP (ref 0–2)
BILIRUB SERPL-MCNC: < 0.2 MG/DL — LOW (ref 0.2–1.2)
BLOOD GAS VENOUS - CREATININE: 0.64 MG/DL — SIGNIFICANT CHANGE UP (ref 0.5–1.3)
BLOOD GAS VENOUS - FIO2: 21 — SIGNIFICANT CHANGE UP
BUN SERPL-MCNC: 17 MG/DL — SIGNIFICANT CHANGE UP (ref 7–23)
CALCIUM SERPL-MCNC: 9.4 MG/DL — SIGNIFICANT CHANGE UP (ref 8.4–10.5)
CARBAMAZEPINE SERPL-MCNC: 11.3 UG/ML — SIGNIFICANT CHANGE UP (ref 4–12)
CHLORIDE BLDV-SCNC: 103 MMOL/L — SIGNIFICANT CHANGE UP (ref 96–108)
CHLORIDE SERPL-SCNC: 100 MMOL/L — SIGNIFICANT CHANGE UP (ref 98–107)
CO2 SERPL-SCNC: 26 MMOL/L — SIGNIFICANT CHANGE UP (ref 22–31)
CREAT SERPL-MCNC: 0.65 MG/DL — SIGNIFICANT CHANGE UP (ref 0.5–1.3)
EOSINOPHIL # BLD AUTO: 0.19 K/UL — SIGNIFICANT CHANGE UP (ref 0–0.5)
EOSINOPHIL NFR BLD AUTO: 1.9 % — SIGNIFICANT CHANGE UP (ref 0–6)
GAS PNL BLDV: 135 MMOL/L — LOW (ref 136–146)
GLUCOSE BLDV-MCNC: 113 MG/DL — HIGH (ref 70–99)
GLUCOSE SERPL-MCNC: 114 MG/DL — HIGH (ref 70–99)
HCO3 BLDV-SCNC: 24 MMOL/L — SIGNIFICANT CHANGE UP (ref 20–27)
HCT VFR BLD CALC: 36.1 % — SIGNIFICANT CHANGE UP (ref 34.5–45)
HCT VFR BLDV CALC: 37.5 % — SIGNIFICANT CHANGE UP (ref 34.5–45)
HGB BLD-MCNC: 11.6 G/DL — SIGNIFICANT CHANGE UP (ref 11.5–15.5)
HGB BLDV-MCNC: 12.2 G/DL — SIGNIFICANT CHANGE UP (ref 11.5–15.5)
IMM GRANULOCYTES NFR BLD AUTO: 0.5 % — SIGNIFICANT CHANGE UP (ref 0–1.5)
LACTATE BLDV-MCNC: 3.1 MMOL/L — HIGH (ref 0.5–2)
LYMPHOCYTES # BLD AUTO: 1.39 K/UL — SIGNIFICANT CHANGE UP (ref 1–3.3)
LYMPHOCYTES # BLD AUTO: 13.7 % — SIGNIFICANT CHANGE UP (ref 13–44)
MAGNESIUM SERPL-MCNC: 2 MG/DL — SIGNIFICANT CHANGE UP (ref 1.6–2.6)
MCHC RBC-ENTMCNC: 31.4 PG — SIGNIFICANT CHANGE UP (ref 27–34)
MCHC RBC-ENTMCNC: 32.1 % — SIGNIFICANT CHANGE UP (ref 32–36)
MCV RBC AUTO: 97.6 FL — SIGNIFICANT CHANGE UP (ref 80–100)
MONOCYTES # BLD AUTO: 0.73 K/UL — SIGNIFICANT CHANGE UP (ref 0–0.9)
MONOCYTES NFR BLD AUTO: 7.2 % — SIGNIFICANT CHANGE UP (ref 2–14)
NEUTROPHILS # BLD AUTO: 7.74 K/UL — HIGH (ref 1.8–7.4)
NEUTROPHILS NFR BLD AUTO: 76.2 % — SIGNIFICANT CHANGE UP (ref 43–77)
NRBC # FLD: 0 K/UL — SIGNIFICANT CHANGE UP (ref 0–0)
PCO2 BLDV: 64 MMHG — HIGH (ref 41–51)
PH BLDV: 7.28 PH — LOW (ref 7.32–7.43)
PHOSPHATE SERPL-MCNC: 3.1 MG/DL — SIGNIFICANT CHANGE UP (ref 2.5–4.5)
PLATELET # BLD AUTO: 210 K/UL — SIGNIFICANT CHANGE UP (ref 150–400)
PMV BLD: 9.4 FL — SIGNIFICANT CHANGE UP (ref 7–13)
PO2 BLDV: 23 MMHG — LOW (ref 35–40)
POTASSIUM BLDV-SCNC: 5.8 MMOL/L — HIGH (ref 3.4–4.5)
POTASSIUM SERPL-MCNC: 4.7 MMOL/L — SIGNIFICANT CHANGE UP (ref 3.5–5.3)
POTASSIUM SERPL-SCNC: 4.7 MMOL/L — SIGNIFICANT CHANGE UP (ref 3.5–5.3)
PROT SERPL-MCNC: 7 G/DL — SIGNIFICANT CHANGE UP (ref 6–8.3)
RBC # BLD: 3.7 M/UL — LOW (ref 3.8–5.2)
RBC # FLD: 13.2 % — SIGNIFICANT CHANGE UP (ref 10.3–14.5)
SAO2 % BLDV: 28.6 % — LOW (ref 60–85)
SODIUM SERPL-SCNC: 138 MMOL/L — SIGNIFICANT CHANGE UP (ref 135–145)
WBC # BLD: 10.15 K/UL — SIGNIFICANT CHANGE UP (ref 3.8–10.5)
WBC # FLD AUTO: 10.15 K/UL — SIGNIFICANT CHANGE UP (ref 3.8–10.5)

## 2019-06-08 PROCEDURE — 99285 EMERGENCY DEPT VISIT HI MDM: CPT | Mod: GC

## 2019-06-08 PROCEDURE — 73700 CT LOWER EXTREMITY W/O DYE: CPT | Mod: 26,LT

## 2019-06-08 PROCEDURE — 76376 3D RENDER W/INTRP POSTPROCES: CPT | Mod: 26

## 2019-06-08 PROCEDURE — 73620 X-RAY EXAM OF FOOT: CPT | Mod: 26,LT

## 2019-06-08 PROCEDURE — 70450 CT HEAD/BRAIN W/O DYE: CPT | Mod: 26

## 2019-06-08 PROCEDURE — 73650 X-RAY EXAM OF HEEL: CPT | Mod: 26,LT

## 2019-06-08 PROCEDURE — 73610 X-RAY EXAM OF ANKLE: CPT | Mod: 26,LT

## 2019-06-08 RX ORDER — MORPHINE SULFATE 50 MG/1
4 CAPSULE, EXTENDED RELEASE ORAL ONCE
Refills: 0 | Status: DISCONTINUED | OUTPATIENT
Start: 2019-06-08 | End: 2019-06-08

## 2019-06-08 RX ADMIN — MORPHINE SULFATE 4 MILLIGRAM(S): 50 CAPSULE, EXTENDED RELEASE ORAL at 18:59
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
## 2019-06-08 NOTE — ED PROVIDER NOTE - ATTENDING CONTRIBUTION TO CARE
I have personally performed a face to face diagnostic evaluation on this patient.  I have reviewed the resident note and agree with the history, exam, and plan of care, except as noted.  History and Exam by me shows  yo female with epilepsy presents with seizure today, now with foot pain, vitals normal, alert and oriented, foot tenderness swelling, xray with calculus fracture, podiatry and neuro consult

## 2019-06-08 NOTE — CONSULT NOTE ADULT - ASSESSMENT
63F with Left calcaneal fracture  -pt seen and evaluated in the ED   -Xray reivewed; CT scan ordered for further calcaneal fracture workup   -Neurovascular status of foot intact, low concern for any compartment syndrome   -Posterior splint applied   -pt to be NON weight bearing to left lower extremity   -instructed pt to elevate extremity to help manage swelling   -discussed with pt that may need surgery; will be followed up outpt   -Follow up with Dr Adams within 1 week. Call [phone] to schedule appointment   -discussed with attending
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
## 2019-06-08 NOTE — ED ADULT TRIAGE NOTE - CHIEF COMPLAINT QUOTE
states awoke on bathroom floor,c/o pain l ft. pt states does not recall falling to floor. ems arrival,pt in wheelchair,oriented x3. pt states   I think I had a seizure" states last seizure 1 wk ago fs 100
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
## 2019-06-08 NOTE — ED PROVIDER NOTE - CLINICAL SUMMARY MEDICAL DECISION MAKING FREE TEXT BOX
63 Y F with complex medical hx, seizure hx likely 2/2 brain mass on multiple meds had breakthrough seizure today with injury to ankle and unwitnessed fall. Will get CTH, drug levels, basic labs, XR of ankle. Will ask neuro to see pt since she follows with house neurology, dispo pending recs.

## 2019-06-08 NOTE — ED PROVIDER NOTE - PROGRESS NOTE DETAILS
Nely Cabrera M.D. Resident: Pt received at signout from Dr. Nixon, evaluated by podiatry, will DC home with podiatry and neurology follow up

## 2019-06-08 NOTE — ED PROVIDER NOTE - NSFOLLOWUPINSTRUCTIONS_ED_ALL_ED_FT
You were seen in the Emergency Department for a seizure and a calcaneal fracture.  1) Advance activity as tolerated.    2) Continue all previously prescribed medications as directed.    3) Follow up with Dr Adams (podiatry) within 1 week. Call [phone] to schedule appointment. Follow up with neurology within 1 week.  Address: [address] [place] [address]  Phone: [phone]  Dr. Claude Hernandez (neurology)  4) Return to the Emergency Department for worsening or persistent symptoms, and/or ANY NEW OR CONCERNING SYMPTOMS. If you have issues obtaining follow up, please call: [phone] to obtain a doctor or specialist who takes your insurance in your area. You were seen in the Emergency Department for a seizure and a calcaneal fracture.  1) Advance activity as tolerated.  Do not bear weight on your left foot. Do not drive, swim, or operate any heavy machinery until cleared by your neurologist.  2) Continue all previously prescribed medications as directed.    3) Follow up with Dr Adams (podiatry) within 1 week. Call [phone] to schedule appointment. Follow up with neurology within 1 week.  Address: [address] [place] [address]  Phone: [phone]  Dr. Claude Hernandez (neurology)  4) Return to the Emergency Department for worsening or persistent symptoms, and/or ANY NEW OR CONCERNING SYMPTOMS. If you have issues obtaining follow up, please call: [phone] to obtain a doctor or specialist who takes your insurance in your area.

## 2019-06-10 ENCOUNTER — OUTPATIENT (OUTPATIENT)
Dept: OUTPATIENT SERVICES | Facility: HOSPITAL | Age: 64
LOS: 1 days | Discharge: HOME | End: 2019-06-10

## 2019-06-10 DIAGNOSIS — D64.9 ANEMIA, UNSPECIFIED: ICD-10-CM

## 2019-06-11 ENCOUNTER — APPOINTMENT (OUTPATIENT)
Dept: HEMATOLOGY ONCOLOGY | Facility: CLINIC | Age: 64
End: 2019-06-11

## 2019-06-11 LAB — ZONISAMIDE SERPL-MCNC: <1 MCG/ML — LOW (ref 10–40)

## 2019-06-12 ENCOUNTER — FORM ENCOUNTER (OUTPATIENT)
Age: 64
End: 2019-06-12

## 2019-06-12 ENCOUNTER — APPOINTMENT (OUTPATIENT)
Dept: PAIN MANAGEMENT | Facility: CLINIC | Age: 64
End: 2019-06-12

## 2019-06-13 ENCOUNTER — OUTPATIENT (OUTPATIENT)
Dept: OUTPATIENT SERVICES | Facility: HOSPITAL | Age: 64
LOS: 1 days | End: 2019-06-13
Payer: COMMERCIAL

## 2019-06-13 ENCOUNTER — APPOINTMENT (OUTPATIENT)
Dept: ULTRASOUND IMAGING | Facility: IMAGING CENTER | Age: 64
End: 2019-06-13
Payer: MEDICARE

## 2019-06-13 ENCOUNTER — EMERGENCY (EMERGENCY)
Facility: HOSPITAL | Age: 64
LOS: 1 days | Discharge: HOME | End: 2019-06-13
Attending: EMERGENCY MEDICINE | Admitting: EMERGENCY MEDICINE
Payer: MEDICARE

## 2019-06-13 ENCOUNTER — RESULT REVIEW (OUTPATIENT)
Age: 64
End: 2019-06-13

## 2019-06-13 VITALS
HEART RATE: 88 BPM | OXYGEN SATURATION: 100 % | SYSTOLIC BLOOD PRESSURE: 130 MMHG | TEMPERATURE: 98 F | DIASTOLIC BLOOD PRESSURE: 85 MMHG | RESPIRATION RATE: 146 BRPM

## 2019-06-13 VITALS
TEMPERATURE: 98 F | SYSTOLIC BLOOD PRESSURE: 120 MMHG | DIASTOLIC BLOOD PRESSURE: 76 MMHG | RESPIRATION RATE: 16 BRPM | OXYGEN SATURATION: 100 % | HEART RATE: 84 BPM

## 2019-06-13 DIAGNOSIS — Z00.8 ENCOUNTER FOR OTHER GENERAL EXAMINATION: ICD-10-CM

## 2019-06-13 PROCEDURE — A4648: CPT

## 2019-06-13 PROCEDURE — 77065 DX MAMMO INCL CAD UNI: CPT | Mod: 26,RT

## 2019-06-13 PROCEDURE — 19083 BX BREAST 1ST LESION US IMAG: CPT

## 2019-06-13 PROCEDURE — 19083 BX BREAST 1ST LESION US IMAG: CPT | Mod: RT

## 2019-06-13 PROCEDURE — 88305 TISSUE EXAM BY PATHOLOGIST: CPT

## 2019-06-13 PROCEDURE — 88305 TISSUE EXAM BY PATHOLOGIST: CPT | Mod: 26

## 2019-06-13 PROCEDURE — 77065 DX MAMMO INCL CAD UNI: CPT

## 2019-06-13 PROCEDURE — 99284 EMERGENCY DEPT VISIT MOD MDM: CPT

## 2019-06-13 RX ORDER — TETANUS TOXOID, REDUCED DIPHTHERIA TOXOID AND ACELLULAR PERTUSSIS VACCINE, ADSORBED 5; 2.5; 8; 8; 2.5 [IU]/.5ML; [IU]/.5ML; UG/.5ML; UG/.5ML; UG/.5ML
0.5 SUSPENSION INTRAMUSCULAR ONCE
Refills: 0 | Status: COMPLETED | OUTPATIENT
Start: 2019-06-13 | End: 2019-06-13

## 2019-06-13 RX ADMIN — TETANUS TOXOID, REDUCED DIPHTHERIA TOXOID AND ACELLULAR PERTUSSIS VACCINE, ADSORBED 0.5 MILLILITER(S): 5; 2.5; 8; 8; 2.5 SUSPENSION INTRAMUSCULAR at 16:37

## 2019-06-13 NOTE — ED PROVIDER NOTE - NSFOLLOWUPINSTRUCTIONS_ED_ALL_ED_FT
Advance activity as tolerated.  Continue all previously prescribed medications as directed unless otherwise instructed.  Ambulate with crutches as shown to you in the emergency department.  Follow up with your primary care physician and podiatry clinic (call [phone]) in 48-72 hours- bring copies of your results.  Return to the ER for worsening or persistent symptoms, and/or ANY NEW OR CONCERNING SYMPTOMS. If you have issues obtaining follow up, please call: [phone] to obtain a doctor or specialist who takes your insurance in your area.  You may call [phone] to make an appointment with the internal medicine clinic.

## 2019-06-13 NOTE — ED PROVIDER NOTE - OBJECTIVE STATEMENT
Pt is a 64 y/o F PMHx seizure d/o, left calcaneal fracture p/w bleeding from left foot.  Pt states she sustained calcaneal fracture of left foot, for which she had splint placed by podiatry.  Since then she has noted 5/10 aching pain at plantar aspect of left foot.  Today, pt noticed cast was wet with blood, prompting visit to ED. Pt denies any recent injuries, numbness, weakness, use of blood thinners, or any other specific complaints.
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
## 2019-06-13 NOTE — ED PROVIDER NOTE - CARE PLAN
Principal Discharge DX:	Fracture of calcaneus  Assessment and plan of treatment:	Advance activity as tolerated.  Continue all previously prescribed medications as directed unless otherwise instructed.  Ambulate with crutches as shown to you in the emergency department.  Follow up with your primary care physician and podiatry clinic (call [phone]) in 48-72 hours- bring copies of your results.  Return to the ER for worsening or persistent symptoms, and/or ANY NEW OR CONCERNING SYMPTOMS. If you have issues obtaining follow up, please call: [phone] to obtain a doctor or specialist who takes your insurance in your area.  You may call [phone] to make an appointment with the internal medicine clinic.

## 2019-06-13 NOTE — ED PROVIDER NOTE - PSH
Brain Surgery - 30 yrs ago    Breast Cancer - Left lumpectomy x 2    Breast Cancer- Chemotherapy & Radiation therapy     Section x 3    History of Appendectomy

## 2019-06-13 NOTE — ED ADULT NURSE NOTE - NSIMPLEMENTINTERV_GEN_ALL_ED
Implemented All Fall with Harm Risk Interventions:  [place] to call system. Call bell, personal items and telephone within reach. Instruct patient to call for assistance. Room bathroom lighting operational. Non-slip footwear when patient is off stretcher. Physically safe environment: no spills, clutter or unnecessary equipment. Stretcher in lowest position, wheels locked, appropriate side rails in place. Provide visual cue, wrist band, yellow gown, etc. Monitor gait and stability. Monitor for mental status changes and reorient to person, place, and time. Review medications for side effects contributing to fall risk. Reinforce activity limits and safety measures with patient and family. Provide visual clues: red socks.

## 2019-06-13 NOTE — ED PROVIDER NOTE - PLAN OF CARE
Advance activity as tolerated.  Continue all previously prescribed medications as directed unless otherwise instructed.  Ambulate with crutches as shown to you in the emergency department.  Follow up with your primary care physician and podiatry clinic (call [phone]) in 48-72 hours- bring copies of your results.  Return to the ER for worsening or persistent symptoms, and/or ANY NEW OR CONCERNING SYMPTOMS. If you have issues obtaining follow up, please call: [phone] to obtain a doctor or specialist who takes your insurance in your area.  You may call [phone] to make an appointment with the internal medicine clinic.

## 2019-06-13 NOTE — ED PROVIDER NOTE - CLINICAL SUMMARY MEDICAL DECISION MAKING FREE TEXT BOX
Pt is a 62 y/o F PMHx seizure d/o, left calcaneal fracture p/w bleeding from left foot.  -- bloody bullae -- podiatry consult

## 2019-06-13 NOTE — ED PROVIDER NOTE - ATTENDING CONTRIBUTION TO CARE
63F presents for hematoma to left ankle s/p soft cast placed last week for calcaneal fracture. Hematomas to both sides of ankle, slight bleeding. Neurovascularly intact. NO signs of infection. Podiatry team at bedside.    Pateint stable, follow podiatry recs.

## 2019-06-13 NOTE — CONSULT NOTE ADULT - ASSESSMENT
63F with acute, comminuted, and minimally depressed left calcaneal fracture  -Pt seen and evaluated in the ED   -CT showing minimal joint depression   -Neurovascular status of foot intact, low concern for compartment syndrome   -Pt was unable to follow up outpatient due to insurance issues  -No pod surg intervention at this time due to poor skin quality, edema, and fracture blisters  -Will follow patient in clinic and possibly book for surgery in 1 week  -AO splint applied to L lower extremity  -Pt to be strictly non-WB to the L lower extremity in wheelchair  -Instructed pt to elevate extremity to help manage swelling   -Discussed with pt that may need surgery once the soft tissue quality improves and swelling subsides; pt expressed verbal understanding  -Follow up at [facility] clinic on Wednesday 6/19/19. Call [phone] to make an appointment  -Discussed with attending

## 2019-06-13 NOTE — ED PROVIDER NOTE - LOWER EXTREMITY EXAM, LEFT
+bruising, generalized tenderness, ruptured bullae at lateral malleolar region w/o active drainage, 3 cm x 3 cm bloody bulla at medial malleolar region

## 2019-06-13 NOTE — ED ADULT NURSE NOTE - OBJECTIVE STATEMENT
received pt to rm 10 c/o lft foot pain secondary to recent wound development from soft cast, pt was seen here last Friday after having seizure, pt injure here lft foot during episode, placed in soft cast which subsequently developed medial and lateral blisters under soft cast that popped and oozing blood, podiatry at bedside, no line/labs ordered at this time, VSS, pt states she does not require pain medicated yet, will monitor.
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
## 2019-06-13 NOTE — ED PROVIDER NOTE - PROGRESS NOTE DETAILS
MARA SOUZA:  Pt seen by podiatry who placed new splint and recommends outpatient follow up at [facility].  Pt given crutches; pt able to exhibit proper crutch use.  pt medically stable for discharge. MARA SOUZA:  Pt seen by podiatry who placed new splint and recommends outpatient follow up at [facility].  Pt given crutches; pt unable to exhibit proper crutch use.  Pt has her own wheelchair and agrees not to ambulate and remain nonweight bearing on left lower extremity.  Pt is medically stable for discharge.
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
## 2019-06-14 LAB — SURGICAL PATHOLOGY STUDY: SIGNIFICANT CHANGE UP

## 2019-06-14 NOTE — ED POST DISCHARGE NOTE - REASON FOR FOLLOW-UP
Other Results of patients zonisamide level showing sub-therapuetic at <1.0. Reviewing chart appears patient seen on 6/8/19 for seizure, noted in chart to be currently taking zonegran. Call placed to patient via # [phone], no answer VM left with callback number. Was able to get in tough with  Gabo via his # and [facility]. Also placed call to neurologist Dr. Hernandez office #[phone] spoke with  and asked that message be left for MD stating patient medication level returned as subtherapeutic and Admin PA callback number was given.  states she will relay message to MD. Admin PA to continue to follow. Results of patients zonisamide level showing sub-therapuetic at <1.0. Reviewing chart appears patient seen on 6/8/19 for seizure, noted in chart that patient currently taking zonegran. Call placed to patient to verify that she is indeed taking medication and to advise her to call her neurologist to discuss results. Call placed via # [phone], no answer VM left with callback number. Was able to get in touch with  Gabo via his # and advised that his wife is to [facility] to follow up results. Also placed call to neurologist Dr. Hernandez office #[phone] spoke with  and asked that message be left for MD stating patient medication level returned as subtherapeutic and Admin PA callback number was given.  states she will relay message to MD. Admin PA to continue to follow.

## 2019-06-21 ENCOUNTER — APPOINTMENT (OUTPATIENT)
Dept: INTERNAL MEDICINE | Facility: CLINIC | Age: 64
End: 2019-06-21
Payer: MEDICARE

## 2019-06-21 VITALS
DIASTOLIC BLOOD PRESSURE: 66 MMHG | SYSTOLIC BLOOD PRESSURE: 108 MMHG | WEIGHT: 100 LBS | TEMPERATURE: 98 F | HEIGHT: 63 IN | OXYGEN SATURATION: 98 % | HEART RATE: 96 BPM | BODY MASS INDEX: 17.72 KG/M2

## 2019-06-21 PROCEDURE — 99214 OFFICE O/P EST MOD 30 MIN: CPT

## 2019-06-21 NOTE — PHYSICAL EXAM
[No Acute Distress] : no acute distress [Well Nourished] : well nourished [Well Developed] : well developed [No JVD] : no jugular venous distention [Supple] : supple [No Lymphadenopathy] : no lymphadenopathy [No Respiratory Distress] : no respiratory distress  [Clear to Auscultation] : lungs were clear to auscultation bilaterally [No Accessory Muscle Use] : no accessory muscle use [Regular Rhythm] : with a regular rhythm [Normal Rate] : normal rate  [Normal S1, S2] : normal S1 and S2 [No Murmur] : no murmur heard [Soft] : abdomen soft [Non Tender] : non-tender [Normal Bowel Sounds] : normal bowel sounds [Normal Supraclavicular Nodes] : no supraclavicular lymphadenopathy [Normal Anterior Cervical Nodes] : no anterior cervical lymphadenopathy [Normal Posterior Cervical Nodes] : no posterior cervical lymphadenopathy [No Rash] : no rash

## 2019-06-21 NOTE — HISTORY OF PRESENT ILLNESS
[FreeTextEntry1] : f/u \par pt had seizure when she was getting mammo\par fractured L heel \par is in cast for 6 weeks- following w/ Dr Masters \par \par breast cancer \par s/p SX / chemo / RT \par was treated w/ tamoxifen and Femara in the past \par following with new onc \par the latest breast bx was benign per pt hx \par \par non-ischemic CMP \par stable\par \par OP \par - following with endo\par - on no mds bc ongoing dental sx \par

## 2019-06-21 NOTE — REVIEW OF SYSTEMS
[Fatigue] : fatigue [Negative] : Integumentary [FreeTextEntry9] : L foot pain  [de-identified] : seizure - poorly controlled

## 2019-06-28 ENCOUNTER — APPOINTMENT (OUTPATIENT)
Dept: HEMATOLOGY ONCOLOGY | Facility: CLINIC | Age: 64
End: 2019-06-28
Payer: MEDICARE

## 2019-06-28 VITALS
BODY MASS INDEX: 17.8 KG/M2 | HEART RATE: 62 BPM | OXYGEN SATURATION: 98 % | WEIGHT: 104.28 LBS | SYSTOLIC BLOOD PRESSURE: 120 MMHG | TEMPERATURE: 97.4 F | HEIGHT: 64.37 IN | RESPIRATION RATE: 16 BRPM | DIASTOLIC BLOOD PRESSURE: 74 MMHG

## 2019-06-28 PROCEDURE — 99215 OFFICE O/P EST HI 40 MIN: CPT

## 2019-07-01 NOTE — HISTORY OF PRESENT ILLNESS
[de-identified] : Stage 1 breast Ca in 1998. treated with CMF, Lumpectomy and RT\par Was started on tamoxifen in 1999- 2004 then femara in 10/04-11/09.\par  [de-identified] : Bilateral mammography and ultrasonography 6/4/19 birads 4A - R 10:00 [id number] hypoechoic mass. 6/14/19: R 10:00 FN biopsied, fibrocystic change\par Saw Dr Saima CARRAZNA 11/2018\par Dr. Hernandez - seizure management. She had a seizure at mammogram - left heel fracture, in boot, seeing Dr. Moyer orthopedics for follow up soon\par 10/17 DEXA - osteoporosis, follows with Dr. Greco endocrinology, has had dental work recently\par 6/12/18 CT chest stable pulmonary nodules, smoking\par PMD Dr. Blanc - MRI Abd for adenoma monitoring, follows eveyr 3 months\par Dr. Enriquez pain management - on stable regimen for headaches due to prior brain surgery and seizures\par GI Dr. Rios 11/2017 Cscope with polyp next 5 years, EGD 4/2018 gastritis

## 2019-07-01 NOTE — ASSESSMENT
[FreeTextEntry1] : 64yo woman diagnosed with Stage 1 breast Ca in 1998. treated with CMF, Lumpectomy and RT. Was started on tamoxifen in 1999- 2004 then femara in 10/04-11/09. On surveillance and TERE.\par \par -Follow up Dr. Landaverde, continue surveillance imaging as recommended\par -Advised follow up with endocrinology, to consider bone therapies soumya in the setting of osteoporosis and fracture\par -RTO 6 months, sooner if issues or concerns

## 2019-07-01 NOTE — PHYSICAL EXAM
[Normal] : affect appropriate [de-identified] : in wheelchair today [de-identified] : no masses or adenopathy palpable b/l [de-identified] : L foot in boot/wrapped

## 2019-07-02 ENCOUNTER — MEDICATION RENEWAL (OUTPATIENT)
Age: 64
End: 2019-07-02

## 2019-07-02 ENCOUNTER — APPOINTMENT (OUTPATIENT)
Dept: ORTHOPEDIC SURGERY | Facility: CLINIC | Age: 64
End: 2019-07-02
Payer: MEDICARE

## 2019-07-02 DIAGNOSIS — Z78.9 OTHER SPECIFIED HEALTH STATUS: ICD-10-CM

## 2019-07-02 DIAGNOSIS — Z82.62 FAMILY HISTORY OF OSTEOPOROSIS: ICD-10-CM

## 2019-07-02 PROCEDURE — 73630 X-RAY EXAM OF FOOT: CPT | Mod: LT

## 2019-07-02 PROCEDURE — 99203 OFFICE O/P NEW LOW 30 MIN: CPT

## 2019-07-03 ENCOUNTER — MEDICATION RENEWAL (OUTPATIENT)
Age: 64
End: 2019-07-03

## 2019-07-11 ENCOUNTER — APPOINTMENT (OUTPATIENT)
Dept: PAIN MANAGEMENT | Facility: CLINIC | Age: 64
End: 2019-07-11
Payer: MEDICARE

## 2019-07-11 VITALS
BODY MASS INDEX: 17.75 KG/M2 | HEART RATE: 67 BPM | SYSTOLIC BLOOD PRESSURE: 114 MMHG | HEIGHT: 64 IN | DIASTOLIC BLOOD PRESSURE: 71 MMHG | WEIGHT: 104 LBS

## 2019-07-11 PROCEDURE — 99213 OFFICE O/P EST LOW 20 MIN: CPT

## 2019-07-11 NOTE — REASON FOR VISIT
[Follow-Up: _____] : a [unfilled] follow-up visit [Other: _____] : [unfilled] [FreeTextEntry1] : Chronic isolated lower back pain and headaches.

## 2019-07-11 NOTE — HISTORY OF PRESENT ILLNESS
[FreeTextEntry1] : Misty returned today for a follow up visit, accompanied by her daughter. Her chronic pain is controlled with her current medication regimen.  She continues to report both decrease pain and increase functioning.  VAS=4-5/10 with medications.  She also uses medical marijuanna for additional relief; however, she has not used it consistently.  Her headaches have been fairly stable- no increase intensity or frequency. \par \par She had a seizure several weeks ago while in the restroom.  During her seizure she fractured her left calcaneous.  She has been seen by orthopedist.  She is in a boot.  She is to see her pcp tomorrow and be started with PT.    \par \par She follows with the epilepsy MDs for her seizures. \par \par She denies depression and suicidal ideations.  \par \par No other medical complaints.

## 2019-07-11 NOTE — DISCUSSION/SUMMARY
[FreeTextEntry1] :  reviewed.  No signs of toxicity noted.  Will continue to monitor. Her medications were renewed, without changes.  The risks of long term opioid use reviewed with the patient, as well as alternative treatment options.  \par \par She appears to be using medications reasonably and responsibly.  She denies side effects to medications-bm regular.  Her opiate agreement was reviewed and we discussed locked box for medications. \par \par She is to follow up with her epilepsy specialist-Dr. Hernandez, gastro, orthopedist, pcp, and oncologist.  \par \par She will RTO in one-two months time or sooner if clinically indicated. \par \par Dr. Loaiza on site.  Billed incident to the service.  [Opioids] : Patient was explained in detail about pain control by using opioids. Patient has signed and fully understands our guidelines for medication and drug screening.  Patient understands the side effects of opioids, including, but not limited to, drug tolerance, dependence, potential for addiction. This class of drugs is habit-forming and ELIJAH regulated. The sedative effects of opioids can be potentiated by taking alcohol or any sleeping pills, along with opioids. The decision to drive is patient’s responsibility, as opioids can affect his/her driving ability and ability to concentrate. The long-term place is not clear, however, patient understands that once the pain control optimizes, the goal will be to wean off the opioids. All the issues regarding opioid treatment have been addressed satisfactorily.

## 2019-07-11 NOTE — PHYSICAL EXAM
[General Appearance - Alert] : alert [Place] : oriented to place [Person] : oriented to person [Motor Tone] : muscle tone was normal in all four extremities [Abnormal Walk] : normal gait [Motor Strength] : muscle strength was normal in all four extremities [Sclera] : the sclera and conjunctiva were normal [Balance] : balance was intact [Neck Appearance] : the appearance of the neck was normal [Arterial Pulses Carotid] : carotid pulses were normal with no bruits [Musculoskeletal - Swelling] : no joint swelling seen [Bowel Sounds] : normal bowel sounds [No CVA Tenderness] : no ~M costovertebral angle tenderness [Skin Turgor] : normal skin turgor [Skin Color & Pigmentation] : normal skin color and pigmentation [] : no rash

## 2019-07-12 ENCOUNTER — APPOINTMENT (OUTPATIENT)
Dept: INTERNAL MEDICINE | Facility: CLINIC | Age: 64
End: 2019-07-12
Payer: MEDICARE

## 2019-07-12 VITALS
BODY MASS INDEX: 17.75 KG/M2 | DIASTOLIC BLOOD PRESSURE: 70 MMHG | WEIGHT: 104 LBS | OXYGEN SATURATION: 98 % | TEMPERATURE: 97.8 F | HEART RATE: 73 BPM | SYSTOLIC BLOOD PRESSURE: 110 MMHG | HEIGHT: 64 IN

## 2019-07-12 DIAGNOSIS — Z87.828 PERSONAL HISTORY OF OTHER (HEALED) PHYSICAL INJURY AND TRAUMA: ICD-10-CM

## 2019-07-12 PROCEDURE — 99213 OFFICE O/P EST LOW 20 MIN: CPT
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
## 2019-07-12 NOTE — PHYSICAL EXAM
[No Acute Distress] : no acute distress [Well-Appearing] : well-appearing [de-identified] : edema through left ankle and foot; heeling wounds on inner and outer ankle, no surrounding erythema or induration, no exudate/discharge

## 2019-07-12 NOTE — HISTORY OF PRESENT ILLNESS
[FreeTextEntry8] : \par Seizure in bathroom.  Broke left heel, was evaluated in ER.  Soft boot placed.  \par H/o left breast ca, diagnosed 22 y.o. ago.  S/p chemo, radiation.  Recent right breast biopsy and at the time had bleeding from left foot (patient reports had blood blisters on inside and outside of ankle), went back to ER, placed in hard cast.  Saw ortho 2 weeks later.  Hard boot placed.  Per patient hard boot to be continued for 3 months, patient to remain off of foot and advised to follow up for PT.   \par \par Patient continues to have lots of pain in the heel with weight bearing.  Continues to follow with pain management.  Takes prescribed marijuana at night and if wakes up at night, hydrocodone for headaches.  Takes boot off for showering and at night.  No fever, no chills.  Patient has been doing her own wound care - washes with water, then alcohol spray, then applies bacitracin and Silvadene, then applies gauze, then ace wrap, twice daily.  \par

## 2019-07-12 NOTE — REVIEW OF SYSTEMS
[Fever] : no fever [Chills] : no chills [Night Sweats] : no night sweats [FreeTextEntry9] : left heel pain  [de-identified] : left foot wound

## 2019-07-12 NOTE — PHYSICAL EXAM
[de-identified] : \par The following radiographs were ordered and read by me during this patients visit. I reviewed each radiograph in detail with the patient and discussed the findings as highlighted below. \par \par 3 views of the left foot were obtained today that show impacted displaced calcaneal fracture. Boeller angle ~30, gissane ~120.\par \par CT left foot dated 6.8.19 shows evidence of acute comminuted depressed calcaneal fracture [de-identified] : Oriented to time, place, person\par Mood: Normal\par Affect: Normal\par Appearance: Healthy, well appearing, no acute distress.\par Gait: Antalgic\par Assistive Devices: Scooter\par \par Left foot exam\par \par Skin: Resolving blisters with black eschar to heal\par Inspection: Mild swelling, no effusion\par Pulses: 2+ DP/PT pulses\par ROM: 10 degrees of dorsiflexion, 15 degrees of plantarflexion, limited subtalar motion. \par Tenderness: Tender to the heel \par Stability: Stable \par Strength: Intact TA/GS/EHL\par Neuro: In tact to light touch throughout

## 2019-07-12 NOTE — HISTORY OF PRESENT ILLNESS
[de-identified] : 63 year old female history of breast cancer and seizures presents today with left foot injury sustained on 6/8/19. Patient had a grand mal seizure and injured her foot during that episode. She was evaluated at [facility] and found to have a transverse fracture through the posterior aspect of the calcaneus. Presents in a splint since injury.  Reports draining wounds, and no followup until this time.\par \par The patient's past medical history, past surgical history, medications and allergies were reviewed by me today with the patient and documented accordingly. In addition, the patient's family and social history, which were noncontributory to this visit, were reviewed also.

## 2019-07-12 NOTE — DISCUSSION/SUMMARY
[de-identified] : 63-year-old female with left calcaneal fracture\par \par Patient presents with a displaced depressed calcaneal fracture with minimal change to Gissane/Boeller angles. Evidence of subtalar involvement on CT imaging. Patient has good functional range of motion at this time despite lack of followup. Patient has had some complications with fracture blisters, that appeared to have resolved, but presents with black eschar over the heel.\par \par Recommendation is for continued conservative nonoperative management left calcaneus. Recommend continued nonweightbearing for a total of 3 months. We'll place patient into a Cam Walker boot, and will require appropriate wound care as prescribed.\par \par Recommend local pain control. Nonweightbearing.\par \par Followup one month for repeat imaging\par

## 2019-07-16 ENCOUNTER — APPOINTMENT (OUTPATIENT)
Dept: NEUROLOGY | Facility: CLINIC | Age: 64
End: 2019-07-16
Payer: MEDICARE

## 2019-07-16 VITALS
HEIGHT: 64 IN | SYSTOLIC BLOOD PRESSURE: 123 MMHG | BODY MASS INDEX: 17.75 KG/M2 | DIASTOLIC BLOOD PRESSURE: 76 MMHG | HEART RATE: 71 BPM | WEIGHT: 104 LBS

## 2019-07-16 PROCEDURE — 99215 OFFICE O/P EST HI 40 MIN: CPT
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
## 2019-07-16 NOTE — DISCUSSION/SUMMARY
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  We discussed the possibility of epilepsy sx in detail.  She had neuropsych testing which showed diffuse cerebral dysfunction, right greater than left.\par \par Plan:\par -Tried GoodRx to get Onfi from [facility] at 40mg bid.  Will go to 20/40 Onfi for one week with Kln 0.5 bid, and then 40/40 Onfi with Kln 0.5 qhs and then one week later off Kln.  Risk of seizure discussed.\par -Patient also with osteoporosis.  Discussed risks of CBZ in osteoporosis, but risk of stopping medication high in increasing her seizures.  She knows risk of fracture at this time and is being treated by her PMD. \par - encouraged 3 servings Ca+ in diet and low weight bearing exercises\par - reviewed seizure triggers\par -follow up in 2-3 months.\par \par . [Medically Refractory (seizure within the last year)] : Medically Refractory (seizure within the last year) [Complex Partial] : complex partial [Symptomatic] : symptomatic [Risks Associated with Driving/[place] Law] : As per my usual protocol, the patient was advised in regards to risks and driving privileges associated with the [place] Guidelines.  [Safety Recommendations] : The patient was advised in regards to the risk of seizures and general seizure safety recommendations including not to be bathing alone, climbing to high places and operating heavy machinery. [Compliance with Medications] : The importance of compliance with medications was reinforced. [Bone Health Education] : Patient was educated in regards to bone health and an increased risk of osteoporosis in patients with epilepsy. [Sleep Hygiene/Sleep Disruption Risks] : Sleep hygiene and the risks of sleep disruption were discussed. [Surgical Options/Risks/Benefits] : Surgical options/risks/benefits for intractable epilepsy were discussed. [Risk of Death] : Risk of death associated with seizures / SUDEP was discussed. [Opioids] : Patient was explained in detail about pain control by using opioids. Patient has signed and fully understands our guidelines for medication and drug screening.  Patient understands the side effects of opioids, including, but not limited to, drug tolerance, dependence, potential for addiction. This class of drugs is habit-forming and ELIJAH regulated. The sedative effects of opioids can be potentiated by taking alcohol or any sleeping pills, along with opioids. The decision to drive is patient’s responsibility, as opioids can affect his/her driving ability and ability to concentrate. The long-term place is not clear, however, patient understands that once the pain control optimizes, the goal will be to wean off the opioids. All the issues regarding opioid treatment have been addressed satisfactorily.

## 2019-07-25 ENCOUNTER — APPOINTMENT (OUTPATIENT)
Dept: ENDOCRINOLOGY | Facility: CLINIC | Age: 64
End: 2019-07-25

## 2019-07-26 ENCOUNTER — APPOINTMENT (OUTPATIENT)
Dept: WOUND CARE | Facility: CLINIC | Age: 64
End: 2019-07-26
Payer: MEDICARE

## 2019-07-26 PROCEDURE — 99202 OFFICE O/P NEW SF 15 MIN: CPT
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
## 2019-07-31 ENCOUNTER — APPOINTMENT (OUTPATIENT)
Dept: CARDIOLOGY | Facility: CLINIC | Age: 64
End: 2019-07-31
Payer: MEDICARE

## 2019-07-31 ENCOUNTER — NON-APPOINTMENT (OUTPATIENT)
Age: 64
End: 2019-07-31

## 2019-07-31 VITALS
SYSTOLIC BLOOD PRESSURE: 130 MMHG | OXYGEN SATURATION: 99 % | DIASTOLIC BLOOD PRESSURE: 69 MMHG | RESPIRATION RATE: 16 BRPM | HEART RATE: 59 BPM | WEIGHT: 105 LBS | HEIGHT: 64 IN | BODY MASS INDEX: 17.93 KG/M2

## 2019-07-31 VITALS — HEART RATE: 66 BPM | SYSTOLIC BLOOD PRESSURE: 136 MMHG | DIASTOLIC BLOOD PRESSURE: 70 MMHG | OXYGEN SATURATION: 99 %

## 2019-07-31 VITALS — SYSTOLIC BLOOD PRESSURE: 120 MMHG | HEART RATE: 63 BPM | DIASTOLIC BLOOD PRESSURE: 68 MMHG | OXYGEN SATURATION: 99 %

## 2019-07-31 VITALS — DIASTOLIC BLOOD PRESSURE: 67 MMHG | SYSTOLIC BLOOD PRESSURE: 127 MMHG | OXYGEN SATURATION: 99 % | HEART RATE: 59 BPM

## 2019-07-31 PROCEDURE — 93040 RHYTHM ECG WITH REPORT: CPT | Mod: 59

## 2019-07-31 PROCEDURE — 99215 OFFICE O/P EST HI 40 MIN: CPT

## 2019-07-31 PROCEDURE — 93000 ELECTROCARDIOGRAM COMPLETE: CPT
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
## 2019-07-31 NOTE — ADDENDUM
[FreeTextEntry1] : I spent 60 minutes face to face time with the patient, from 10:00 to 11:00 on 7/31/19. Thirty minutes were devoted to patient counseling with emphasis on maintenance smoking cessation (see Narrative section). Prior to this visit, I reviewed office records of Mariola Blanc, Gastroenterology and Neuro / Headache/Pain management service.

## 2019-07-31 NOTE — DISCUSSION/SUMMARY
[FreeTextEntry1] : \par SMOKING CESSATION:\par We all know the health risks of smoking, and most of us know that kicking the habit is the single biggest improvement to health a smoker can make. But that doesn’t make it any easier to kick the habit. Whether you’re a teen smoker or a lifetime pack-a-day smoker, quitting can be tough.\par To increase your chances of success, you need to be motivated, have social support, an understanding of what to expect, and a personal game plan. It is possible to learn how to replace your smoking habits, manage your cravings, and join the millions of people who have kicked the habit for good.\par Smoking tobacco is both a psychological habit and a physical addiction. The act of smoking is ingrained as a daily ritual and, at the same time, the nicotine from cigarettes provides a temporary, and addictive, high. Eliminating that regular fix of nicotine will cause your body to experience physical withdrawal symptoms and cravings. To successfully quit smoking, you’ll need to address both the habit and the addiction by changing your behavior and dealing with nicotine withdrawal symptoms.\par Relieving unpleasant and overwhelming feelings without cigarettes\par Managing unpleasant feelings such as stress, depression, loneliness, fear, and anxiety are some of the most common reasons why adults smoke. When you have a bad day, it can seem like your cigarettes are your only friend. Smoking can temporarily make feelings such as sadness, stress, anxiety, depression, and boredom evaporate into thin air. As much comfort as cigarettes provide, though, it’s important to remember that there are healthier (and more effective) ways to keep unpleasant feelings in check. These may include exercising, meditating, using sensory relaxation strategies, and practicing simple breathing exercises. \par For many people, an important aspect of quitting smoking is to find alternate ways to handle these difficult feelings without smoking. Even when cigarettes are no longer a part of your life, the painful and unpleasant feelings that may have prompted you to smoke in the past will still remain. So, it’s worth spending some time thinking about the different ways you intend to deal with stressful situations and the daily irritations that would normally have you reaching for a cigarette.\par Tailoring a personal game plan to your specific needs and desires can be a big help. List the reasons why you want to quit and then keep copies of the list in the places where you’d normally keep your cigarettes, such as in your jacket, purse, or car. Your reasons for quitting smoking might include:\par I will feel healthier and have more energy, whiter teeth and fresher breath.\par I will lower my risk for cancer, heart attacks, strokes, early death, cataracts, and skin wrinkling.\par I will make myself and my partner, friends, and family proud of me.\par I will no longer expose my children and others to the dangers of my second-hand smoke.\par I will have a healthier baby (If you or your partner is pregnant).\par I will have more money to spend.\par I won't have to worry: "When will I get to smoke next?"\par Questions to ask yourself\par To successfully detach from smoking, you will need to identify and address your smoking habits, the true nature of your dependency, and the techniques that work for you. These types of questions can help:\par Do you feel the need to smoke at every meal?\par Are you more of a social smoker?\par Is it a very bad addiction (more than a pack a day)? Or would a simple nicotine patch do the job?\par Is your cigarette smoking linked to other addictions, such as alcohol or gambling?\par Are you open to hypnotherapy and/or acupuncture?\par Are you someone who is open to talking about your addiction with a therapist or counselor?\par Are you interested in getting into a fitness program?\par Take the time to think of what kind of smoker you are, which moments of your life call for a cigarette, and why. This will help you to identify which tips, techniques or therapies may be most beneficial for you. \par Start your stop smoking plan with START\par S = Set a quit date.\par T = Tell family, friends, and co-workers that you plan to quit.\par A = Anticipate and plan for the challenges you'll face while quitting.\par R = Remove cigarettes and other tobacco products from your home, car, and work.\par T = Talk to your doctor about getting help to quit.\par \par After quitting, you may feel dizzy, restless, or even have strong headaches because you’re lacking the immediate release of sugar that comes from nicotine. You may also have a bigger appetite. These sugar-related cravings should only last a few days until your body adjusts so keep your sugar levels a bit higher than usual on those days by drinking plenty of juice (unless you’re a diabetic). It will help prevent the craving symptoms and help your body re-adjust back to normal.\par Tips for managing other cigarette cravings\par Cravings associated with meals\par For some smokers, ending a meal means lighting up, and the prospect of giving that up may appear daunting. TIP: replace that moment after a meal with something such as a piece of fruit, a (healthy) dessert, a square of chocolate, or a stick of gum.\par \par Alcohol and cigarettes\par Many people have a habit of smoking when they have an alcoholic drink. TIP: try non-alcoholic drinks, or try drinking only in bars, restaurant-bars, or friends’ houses where smoking inside is prohibited. Or try snacking on nuts and chips, or chewing on a straw or cocktail stick.\par \par Cravings associated with social smoking\par When friends, family, and co-workers smoke around you, it is doubly difficult to quit or avoid relapse. TIP: Your social circles need to know that you are changing your habits so talk about your decision to quit. Let them know they won’t be able to smoke when you’re in the car with them or taking a coffee break together. \par \par In your workplace, don’t take all your coffee breaks with smokers only, do something else instead, or find non-smokers to have your breaks with.\par Additional tips to deal with cravings and withdrawal symptoms\par Stay active: Keep yourself distracted and occupied, go for walks.\par Keep your hands/fingers busy: Squeeze balls, pencils, or paper clips are good substitutes to satisfy that need for tactile stimulation.\par Keep your mind busy: Read a book or magazine, listen to some music you love.\par Find an oral substitute: Keep other things around to pop in your mouth when you’re craving a cigarette.\par Good choices include mints, hard candy, carrot or celery sticks, gum, and sunflower seeds.\par Drink lots of water: Flushing toxins from your body minimizes withdrawal symptoms and helps cravings pass faster.\par Look for new ways to relax and to cope with depression or anxiety: There are a lot of ways to improve your mood without smoking. \par \par Finding the resources and support to quit smoking\par There are many different methods that have successfully helped people to quit smoking, including:\par Quitting smoking cold turkey.\par Systematically decreasing the number of cigarettes you smoke.\par Reducing your intake of nicotine gradually over time.\par Using nicotine replacement therapy or non-nicotine medications to reduce withdrawal symptoms.\par Utilizing nicotine support groups.\par Trying hypnosis, acupuncture, or counseling using cognitive behavioral techniques.\par You may be successful with the first method you try. More likely, you’ll have to try a number of different methods or a combination of treatments to find the ones that work best for you.\par \par Medication therapy\par Smoking cessation medications can ease withdrawal symptoms and reduce cravings, and are most effective when used as part of a comprehensive stop smoking program monitored by your physician. Talk to your doctor about your options and whether an anti-smoking medication is right for you. U.S. Food and Drug Administration (FDA) approved options are: \par \par Nicotine Replacement Therapy\par Nicotine replacement therapy involves "replacing" cigarettes with other nicotine substitutes, such as nicotine gum or a nicotine patch. It works by delivering small and steady doses of nicotine into the body to relieve some of the withdrawal symptoms without the tars and poisonous gases found in cigarettes. This type of treatment helps smokers focus on breaking their psychological addiction and makes it easier to concentrate on learning new behaviors and coping skills.\par \par Non-Nicotine Medication\par These medications help you stop smoking by reducing cravings and withdrawal symptoms without the use of nicotine. Medications such as bupropion (Zyban) and varenicline (Chantix) are intended for short-term use only.\par \par Non-medication therapies\par There are several things you can do to stop smoking that don’t involve nicotine replacement therapy or prescription medications:\par Hypnosis\par A popular option that has produced good results. Forget anything you may have seen from stage hypnotists, hypnosis works by getting you into a deeply relaxed state where you are open to suggestions that strengthen your resolve to quit smoking and increase your negative feelings toward cigarettes. Ask your doctor to recommend a qualified smoking cessation hypnotherapist in your area or refer to the American Society of Clinical Hypnosis (ASCH) for guidelines on selecting a qualified professional.\par Acupuncture\par One of the oldest known medical techniques, acupuncture is believed to work by triggering the release of endorphins (natural pain relievers) that allow the body to relax. As a smoking cessation aid, acupuncture can be helpful in managing smoking withdrawal symptoms. Ask your doctor for a referral or search for a local practitioner at the American Association of Acupuncture and [place] [employer] (AAAOM).\par Behavioral Therapy\par Nicotine addiction is related to the habitual behaviors (the “rituals”) involved in smoking. Behavior therapy focuses on learning new coping skills and breaking those habits. The American Lung Association offers a free online smoking cessation program that focuses on behavioral change. To find a local behavioral therapist, check with your doctor or search at the Association for Behavioral and Cognitive Therapies (ABCT).\par Motivational Therapies\par Self-help books and websites can provide a number of ways to motivate yourself to quit smoking. One well known example is calculating the monetary savings. Some people have been able to find the motivation to quit just by calculating how much money they will save after they quit. It may be enough to pay for a summer vacation.\par \par \par \par [facility]\par [address]\[place] [address]\par [phone]\par [email or web address]/[email or web address]-[facility]\par \par \par WEIGHT GOALS:\par Category				BMI range - kg/m2	BMI Prime\par Very severely underweight		less than 15		less than 0.60	\par Severely underweight			from 15.0 to 16.0		from 0.60 to 0.64	\par Underweight				from 16.0 to 18.5		from 0.64 to 0.74	\par Normal (healthy weight)			from 18.5 to 25		from 0.74 to 1.0	\par Overweight				from 25 to 30		from 1.0 to 1.2	\par Obese Class I (Moderately obese)		from 30 to 35		from 1.2 to 1.4	\par Obese Class II (Severely obese)		from 35 to 40		from 1.4 to 1.6	\par Obese Class III (Very severely obese)	over 40	over 1.6				\par \par Your current weight is 105 lbs (85 lbs on 1/16/19; 81 lbs on 6/21/18; 85 lbs on 1/9/18). Given current weight and height 5'4", your calculated body mass index (BMI) is 18 kg/sqm. Normal BMI is 18.5-25 kg/sqm. Thus current weight is in the underweight category. Abdominal waist circumference is measured at the level of the umbilicus and is thus not a pants waist measurement. Your current abdominal waist circumference is 31 in. Normal abdominal waist circumference is < 32 inches for women and < 37 inches for men.

## 2019-08-08 ENCOUNTER — APPOINTMENT (OUTPATIENT)
Dept: PAIN MANAGEMENT | Facility: CLINIC | Age: 64
End: 2019-08-08
Payer: MEDICARE

## 2019-08-08 VITALS
WEIGHT: 105 LBS | SYSTOLIC BLOOD PRESSURE: 128 MMHG | HEART RATE: 66 BPM | DIASTOLIC BLOOD PRESSURE: 67 MMHG | HEIGHT: 64 IN | BODY MASS INDEX: 17.93 KG/M2

## 2019-08-08 PROCEDURE — 99213 OFFICE O/P EST LOW 20 MIN: CPT

## 2019-08-08 NOTE — PHYSICAL EXAM
[General Appearance - Alert] : alert [Person] : oriented to person [Motor Tone] : muscle tone was normal in all four extremities [Place] : oriented to place [Motor Strength] : muscle strength was normal in all four extremities [Abnormal Walk] : normal gait [Balance] : balance was intact [Sclera] : the sclera and conjunctiva were normal [Neck Appearance] : the appearance of the neck was normal [Arterial Pulses Carotid] : carotid pulses were normal with no bruits [No CVA Tenderness] : no ~M costovertebral angle tenderness [Bowel Sounds] : normal bowel sounds [Musculoskeletal - Swelling] : no joint swelling seen [Skin Turgor] : normal skin turgor [Skin Color & Pigmentation] : normal skin color and pigmentation [] : no rash

## 2019-08-08 NOTE — DISCUSSION/SUMMARY
[FreeTextEntry1] :  reviewed.  No signs of toxicity noted.  Will continue to monitor.  Norco 10/325 mgs 1 po q 4h prn, MDD= 6 was renewed. The risks of long term opioid use reviewed with the patient, as well as alternative treatment options.  \par \par She appears to be using medications reasonably and responsibly.  She denies side effects to medications-bm regular.  Her opiate agreement was reviewed and we discussed locked box for medications. \par \par She is to follow up with her epilepsy specialist-Dr. Hernandez, gastro, orthopedist, pcp, and oncologist.  \par \par She will RTO in one months time or sooner if clinically indicated. \par \par Dr. Richard on site.  Billed incident to the service.  [Opioids] : Patient was explained in detail about pain control by using opioids. Patient has signed and fully understands our guidelines for medication and drug screening.  Patient understands the side effects of opioids, including, but not limited to, drug tolerance, dependence, potential for addiction. This class of drugs is habit-forming and ELIJAH regulated. The sedative effects of opioids can be potentiated by taking alcohol or any sleeping pills, along with opioids. The decision to drive is patient’s responsibility, as opioids can affect his/her driving ability and ability to concentrate. The long-term place is not clear, however, patient understands that once the pain control optimizes, the goal will be to wean off the opioids. All the issues regarding opioid treatment have been addressed satisfactorily.

## 2019-08-08 NOTE — HISTORY OF PRESENT ILLNESS
[FreeTextEntry1] : Mrs. Tello returned today for a follow up office visit.  She came alone to her visit today.  She reports that she took medical transport.  Her pain continues to be controlled with norco.  Medical marijuanna was tried and did not help. Her medications continue to provide both decrease pain and increase functioning.  VAS=4-6/10 with medications.  Her headaches have been fairly stable- no increase intensity or frequency. \par \par She has an appt with her orthopedist this Friday to follow up her left calcaneous fracture.  She reports that she has been going for PT.  She has a boot on her left lower extremity.     \par \par She follows with the epilepsy MDs for her seizures. \par \par No complaints of depression and suicidal ideations.  \par \par No other medical complaints.

## 2019-08-09 ENCOUNTER — RX RENEWAL (OUTPATIENT)
Age: 64
End: 2019-08-09

## 2019-08-12 ENCOUNTER — APPOINTMENT (OUTPATIENT)
Dept: ORTHOPEDIC SURGERY | Facility: CLINIC | Age: 64
End: 2019-08-12
Payer: MEDICARE

## 2019-08-12 VITALS
BODY MASS INDEX: 17.93 KG/M2 | HEART RATE: 57 BPM | HEIGHT: 64 IN | DIASTOLIC BLOOD PRESSURE: 83 MMHG | WEIGHT: 105 LBS | SYSTOLIC BLOOD PRESSURE: 138 MMHG

## 2019-08-12 PROCEDURE — 73610 X-RAY EXAM OF ANKLE: CPT | Mod: LT

## 2019-08-12 PROCEDURE — 99213 OFFICE O/P EST LOW 20 MIN: CPT

## 2019-08-19 ENCOUNTER — RX RENEWAL (OUTPATIENT)
Age: 64
End: 2019-08-19

## 2019-08-30 ENCOUNTER — APPOINTMENT (OUTPATIENT)
Dept: NEUROLOGY | Facility: CLINIC | Age: 64
End: 2019-08-30
Payer: MEDICARE

## 2019-08-30 VITALS
HEART RATE: 103 BPM | HEIGHT: 64 IN | DIASTOLIC BLOOD PRESSURE: 84 MMHG | BODY MASS INDEX: 17.93 KG/M2 | WEIGHT: 105 LBS | SYSTOLIC BLOOD PRESSURE: 133 MMHG

## 2019-08-30 PROCEDURE — 99214 OFFICE O/P EST MOD 30 MIN: CPT

## 2019-08-30 RX ORDER — CLONAZEPAM 0.5 MG/1
0.5 TABLET ORAL
Qty: 120 | Refills: 0 | Status: DISCONTINUED | COMMUNITY
Start: 2018-02-14 | End: 2019-08-30
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
## 2019-08-30 NOTE — DISCUSSION/SUMMARY
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  We discussed the possibility of epilepsy sx in detail.  She had neuropsych testing which showed diffuse cerebral dysfunction, right greater than left.\par \par Plan:\par - Taking Clobazam 40mg bid and doing better\par - Discussed risks of CBZ in osteoporosis, but risk of stopping medication high in increasing her seizures.  She knows risk of fracture at this time and is being treated by her PMD. \par - encouraged 3 servings Ca+ in diet and low weight bearing exercises\par - reviewed seizure triggers\par -follow up in 2-3 months.\par \par . [Medically Refractory (seizure within the last year)] : Medically Refractory (seizure within the last year) [Complex Partial] : complex partial [Symptomatic] : symptomatic [Risks Associated with Driving/[place] Law] : As per my usual protocol, the patient was advised in regards to risks and driving privileges associated with the [place] Guidelines.  [Safety Recommendations] : The patient was advised in regards to the risk of seizures and general seizure safety recommendations including not to be bathing alone, climbing to high places and operating heavy machinery. [Compliance with Medications] : The importance of compliance with medications was reinforced. [Bone Health Education] : Patient was educated in regards to bone health and an increased risk of osteoporosis in patients with epilepsy. [Sleep Hygiene/Sleep Disruption Risks] : Sleep hygiene and the risks of sleep disruption were discussed. [Surgical Options/Risks/Benefits] : Surgical options/risks/benefits for intractable epilepsy were discussed. [Risk of Death] : Risk of death associated with seizures / SUDEP was discussed. [Opioids] : Patient was explained in detail about pain control by using opioids. Patient has signed and fully understands our guidelines for medication and drug screening.  Patient understands the side effects of opioids, including, but not limited to, drug tolerance, dependence, potential for addiction. This class of drugs is habit-forming and ELIJAH regulated. The sedative effects of opioids can be potentiated by taking alcohol or any sleeping pills, along with opioids. The decision to drive is patient’s responsibility, as opioids can affect his/her driving ability and ability to concentrate. The long-term place is not clear, however, patient understands that once the pain control optimizes, the goal will be to wean off the opioids. All the issues regarding opioid treatment have been addressed satisfactorily.

## 2019-08-30 NOTE — PHYSICAL EXAM
[General Appearance - Alert] : alert [General Appearance - In No Acute Distress] : in no acute distress [Person] : oriented to person [Place] : oriented to place [Time] : oriented to time [Short Term Intact] : short term memory intact [Remote Intact] : remote memory intact [Registration Intact] : recent registration memory intact [Span Intact] : the attention span was normal [Concentration Intact] : normal concentrating ability [Visual Intact] : visual attention was ~T not ~L decreased [Naming Objects] : no difficulty naming common objects [Repeating Phrases] : no difficulty repeating a phrase [Fluency] : fluency intact [Writing A Sentence] : no difficulty writing a sentence [Comprehension] : comprehension intact [Reading] : reading intact [Past History] : adequate knowledge of personal past history [Current Events] : adequate knowledge of current events [Vocabulary] : adequate range of vocabulary [Cranial Nerves Optic (II)] : visual acuity intact bilaterally,  visual fields full to confrontation, pupils equal round and reactive to light [Cranial Nerves Oculomotor (III)] : extraocular motion intact [Cranial Nerves Facial (VII)] : face symmetrical [Cranial Nerves Trigeminal (V)] : facial sensation intact symmetrically [Cranial Nerves Vestibulocochlear (VIII)] : hearing was intact bilaterally [Cranial Nerves Glossopharyngeal (IX)] : tongue and palate midline [Cranial Nerves Accessory (XI - Cranial And Spinal)] : head turning and shoulder shrug symmetric [Cranial Nerves Hypoglossal (XII)] : there was no tongue deviation with protrusion [No Muscle Atrophy] : normal bulk in all four extremities [Motor Strength] : muscle strength was normal in all four extremities [Sensation Tactile Decrease] : light touch was intact [Sensation Vibration Decrease] : vibration was intact [Sensation Pain / Temperature Decrease] : pain and temperature was intact [Proprioception] : proprioception was intact [Balance] : balance was intact [Past-pointing] : there was no past-pointing [Tremor] : no tremor present [2+] : Ankle jerk left 2+ [Plantar Reflex Right Only] : normal on the right [Plantar Reflex Left Only] : normal on the left [Sclera] : the sclera and conjunctiva were normal [PERRL With Normal Accommodation] : pupils were equal in size, round, reactive to light, with normal accommodation [Neck Appearance] : the appearance of the neck was normal [Extraocular Movements] : extraocular movements were intact [Arterial Pulses Carotid] : carotid pulses were normal with no bruits [Full Pulse] : the pedal pulses are present [Edema] : there was no peripheral edema [Bowel Sounds] : normal bowel sounds [No CVA Tenderness] : no ~M costovertebral angle tenderness [Abnormal Walk] : normal gait [Nail Clubbing] : no clubbing  or cyanosis of the fingernails [Musculoskeletal - Swelling] : no joint swelling seen [Motor Tone] : muscle strength and tone were normal [FreeTextEntry1] : Increased pain left arm with limited ROM [Skin Color & Pigmentation] : normal skin color and pigmentation [Skin Turgor] : normal skin turgor [] : no rash

## 2019-09-06 ENCOUNTER — APPOINTMENT (OUTPATIENT)
Dept: PAIN MANAGEMENT | Facility: CLINIC | Age: 64
End: 2019-09-06

## 2019-09-07 ENCOUNTER — APPOINTMENT (OUTPATIENT)
Dept: INTERNAL MEDICINE | Facility: CLINIC | Age: 64
End: 2019-09-07
Payer: MEDICARE

## 2019-09-07 VITALS
DIASTOLIC BLOOD PRESSURE: 82 MMHG | SYSTOLIC BLOOD PRESSURE: 128 MMHG | HEIGHT: 64 IN | TEMPERATURE: 98.4 F | OXYGEN SATURATION: 97 % | BODY MASS INDEX: 17.93 KG/M2 | WEIGHT: 105 LBS | HEART RATE: 87 BPM

## 2019-09-07 DIAGNOSIS — R62.7 ADULT FAILURE TO THRIVE: ICD-10-CM

## 2019-09-07 PROCEDURE — 99214 OFFICE O/P EST MOD 30 MIN: CPT

## 2019-09-07 NOTE — HISTORY OF PRESENT ILLNESS
[FreeTextEntry1] : f/u\par \par pt  has restarted smoking out is trying to cut back \par she is not on any meds for OP bc she is having dental work done\par pt has fx L heel and is going for PT \par still in hard cast \par the CMP  is stable, not on any diuretics \par the last echo showed nl LVEF ,is scheduled for echo next month\par she has been able to maintain her weight at ~105 \par seizure control - not much better , but has no toxicity from meds \par breast cancer survivor - post sx / RT / chemo, had R breast bx in 6/19 - neg for malignancy ,  needs f/u breast US

## 2019-09-07 NOTE — REVIEW OF SYSTEMS
[Joint Pain] : joint pain [Fainting] : fainting [Joint Stiffness] : joint stiffness [Unsteady Walking] : ataxia [Negative] : Respiratory

## 2019-09-07 NOTE — PHYSICAL EXAM
[Cachectic] : cachexia was observed [No Acute Distress] : no acute distress [No Edema] : there was no peripheral edema [Normal] : soft, non-tender, non-distended, no masses palpated, no HSM and normal bowel sounds

## 2019-09-07 NOTE — ASSESSMENT
[FreeTextEntry1] : 1) Seizure control\par - suboptimal control \par - f/u neuro \par - precautions discussed \par - pt does not drive \par \par 2) CMP \par - pt has no s/s of fluid overload \par - will f/u cardio \par - needs f/u echo\par \par 3) h/o breast cancer \par - post SX / RT/ chemo\par - will get US L breast \par \par 4) anxiety / insomnia\par - better with better pain control\par - is on liquid marijuana \par \par needs labs prior to CPE\par \par \par 5) OP \par - not on meds bc dental work \par - home safety discussed / fall prevention - pt has seizures and that is a big concern \par - pt will try to get dental work done ASAP \par pt has been smoking for a long time , but says it is less than 30 pack years smoking

## 2019-09-10 NOTE — HISTORY OF PRESENT ILLNESS
[de-identified] : 63 year old female history of breast cancer and seizures presents today for follow up of left calcaneal fracture sustained on 6/8/19. She has been complaint with CAM boot. She saw wound care but no treatment needed for black eschar. Rates her pain 7/10 and taking Norco. \par

## 2019-09-10 NOTE — PHYSICAL EXAM
[de-identified] : Oriented to time, place, person\par Mood: Normal\par Affect: Normal\par Appearance: Healthy, well appearing, no acute distress.\par Gait: Antalgic\par Assistive Devices: Scooter\par \par Left foot exam\par \par Skin: Resolved blisters with black eschar to heal\par Inspection: Mild swelling, no effusion\par Pulses: 2+ DP/PT pulses\par ROM: 15 degrees of dorsiflexion, 30 degrees of plantarflexion, limited subtalar motion. \par Tenderness: Mild Tender to the heel \par Stability: Stable \par Strength: Intact TA/GS/EHL\par Neuro: In tact to light touch throughout [de-identified] : \par The following radiographs were ordered and read by me during this patients visit. I reviewed each radiograph in detail with the patient and discussed the findings as highlighted below. \par \par 3 views of the left ankle/calcaneus were obtained today that show continued impacted displaced calcaneal fracture. Boeller angle ~30, gissane ~120.\par \par CT left foot dated 6.8.19 shows evidence of acute comminuted depressed calcaneal fracture.

## 2019-09-10 NOTE — DISCUSSION/SUMMARY
[de-identified] : 63-year-old female with left calcaneal fracture\par \par No significant change to the fracture pattern on updated x-ray imaging. Recommendations made for continued conservative management. resolving soft tissue complications.\par \par Recommend pain control. WBAT in CAM\par \par Followup 6wks

## 2019-09-11 ENCOUNTER — APPOINTMENT (OUTPATIENT)
Dept: CV DIAGNOSITCS | Facility: HOSPITAL | Age: 64
End: 2019-09-11
Payer: MEDICARE

## 2019-09-11 ENCOUNTER — OUTPATIENT (OUTPATIENT)
Dept: OUTPATIENT SERVICES | Facility: HOSPITAL | Age: 64
LOS: 1 days | End: 2019-09-11

## 2019-09-11 DIAGNOSIS — Z71.3 DIETARY COUNSELING AND SURVEILLANCE: ICD-10-CM

## 2019-09-11 DIAGNOSIS — I21.9 ACUTE MYOCARDIAL INFARCTION, UNSPECIFIED: ICD-10-CM

## 2019-09-11 DIAGNOSIS — Z01.810 ENCOUNTER FOR PREPROCEDURAL CARDIOVASCULAR EXAMINATION: ICD-10-CM

## 2019-09-11 DIAGNOSIS — J44.9 CHRONIC OBSTRUCTIVE PULMONARY DISEASE, UNSPECIFIED: ICD-10-CM

## 2019-09-11 DIAGNOSIS — I34.0 NONRHEUMATIC MITRAL (VALVE) INSUFFICIENCY: ICD-10-CM

## 2019-09-11 DIAGNOSIS — Z71.6 TOBACCO ABUSE COUNSELING: ICD-10-CM

## 2019-09-11 DIAGNOSIS — E78.5 HYPERLIPIDEMIA, UNSPECIFIED: ICD-10-CM

## 2019-09-11 DIAGNOSIS — I10 ESSENTIAL (PRIMARY) HYPERTENSION: ICD-10-CM

## 2019-09-11 DIAGNOSIS — I25.10 ATHEROSCLEROTIC HEART DISEASE OF NATIVE CORONARY ARTERY WITHOUT ANGINA PECTORIS: ICD-10-CM

## 2019-09-11 PROCEDURE — 93306 TTE W/DOPPLER COMPLETE: CPT | Mod: 26

## 2019-09-12 ENCOUNTER — RESULT REVIEW (OUTPATIENT)
Age: 64
End: 2019-09-12

## 2019-09-12 ENCOUNTER — RESULT CHARGE (OUTPATIENT)
Age: 64
End: 2019-09-12

## 2019-09-12 DIAGNOSIS — R93.1 ABNORMAL FINDINGS ON DIAGNOSTIC IMAGING OF HEART AND CORONARY CIRCULATION: ICD-10-CM

## 2019-09-17 ENCOUNTER — APPOINTMENT (OUTPATIENT)
Dept: PAIN MANAGEMENT | Facility: CLINIC | Age: 64
End: 2019-09-17
Payer: MEDICARE

## 2019-09-17 ENCOUNTER — APPOINTMENT (OUTPATIENT)
Dept: NEUROLOGY | Facility: CLINIC | Age: 64
End: 2019-09-17

## 2019-09-17 VITALS
WEIGHT: 105 LBS | SYSTOLIC BLOOD PRESSURE: 137 MMHG | HEART RATE: 89 BPM | DIASTOLIC BLOOD PRESSURE: 85 MMHG | BODY MASS INDEX: 17.93 KG/M2 | HEIGHT: 64 IN

## 2019-09-17 LAB
25(OH)D3 SERPL-MCNC: 14 NG/ML
ALBUMIN SERPL ELPH-MCNC: 4.5 G/DL
ALP BLD-CCNC: 93 U/L
ALT SERPL-CCNC: 11 U/L
ANION GAP SERPL CALC-SCNC: 14 MMOL/L
AST SERPL-CCNC: 20 U/L
BASOPHILS # BLD AUTO: 0.06 K/UL
BASOPHILS NFR BLD AUTO: 1 %
BILIRUB SERPL-MCNC: 0.3 MG/DL
BUN SERPL-MCNC: 10 MG/DL
CALCIUM SERPL-MCNC: 9.9 MG/DL
CHLORIDE SERPL-SCNC: 106 MMOL/L
CHOLEST SERPL-MCNC: 226 MG/DL
CHOLEST/HDLC SERPL: 3.9 RATIO
CO2 SERPL-SCNC: 24 MMOL/L
CREAT SERPL-MCNC: 0.66 MG/DL
EOSINOPHIL # BLD AUTO: 0.25 K/UL
EOSINOPHIL NFR BLD AUTO: 4 %
ESTIMATED AVERAGE GLUCOSE: 103 MG/DL
GLUCOSE SERPL-MCNC: 91 MG/DL
HBA1C MFR BLD HPLC: 5.2 %
HCT VFR BLD CALC: 40.2 %
HDLC SERPL-MCNC: 58 MG/DL
HGB BLD-MCNC: 12.6 G/DL
IMM GRANULOCYTES NFR BLD AUTO: 0.2 %
LDLC SERPL CALC-MCNC: 149 MG/DL
LYMPHOCYTES # BLD AUTO: 2.42 K/UL
LYMPHOCYTES NFR BLD AUTO: 38.7 %
MAN DIFF?: NORMAL
MCHC RBC-ENTMCNC: 31.3 GM/DL
MCHC RBC-ENTMCNC: 31.7 PG
MCV RBC AUTO: 101.3 FL
MONOCYTES # BLD AUTO: 0.53 K/UL
MONOCYTES NFR BLD AUTO: 8.5 %
NEUTROPHILS # BLD AUTO: 2.98 K/UL
NEUTROPHILS NFR BLD AUTO: 47.6 %
PLATELET # BLD AUTO: 250 K/UL
POTASSIUM SERPL-SCNC: 4.3 MMOL/L
PROT SERPL-MCNC: 7.5 G/DL
RBC # BLD: 3.97 M/UL
RBC # FLD: 13.9 %
SODIUM SERPL-SCNC: 144 MMOL/L
TRIGL SERPL-MCNC: 97 MG/DL
TSH SERPL-ACNC: 0.87 UIU/ML
WBC # FLD AUTO: 6.25 K/UL

## 2019-09-17 PROCEDURE — 99213 OFFICE O/P EST LOW 20 MIN: CPT

## 2019-09-17 NOTE — HISTORY OF PRESENT ILLNESS
[FreeTextEntry1] : Mrs. Tello returned today for a follow up office visit for treatment of her chronic pain.  She came alone to her visit today.  She takes medical transportation to and from her appointments.  Her pain is currently controlled with norco.  She has not had much success with the medical marijuanna.  VAS=4-6/10 (decrease pain and increase functioning with her medications).  Her headaches have been fairly stable- no increase intensity or frequency. \par \par She continues to follow up with her orthopedist due to her left calcaneous fracture.  She is still wearing her boot cast and attending PT.       \par \par She follows with the epilepsy MDs for her seizures. She is also presently being worked up for cardiac issues per her report. \par \par No complaints of depression/suicidal ideations.  \par \par No other medical complaints.

## 2019-09-17 NOTE — DISCUSSION/SUMMARY
[FreeTextEntry1] :  registry reviewed.  No signs of toxicity noted.  Will continue to monitor.  Norco 10/325 mgs 1 po q 4h prn, MDD= 6 was renewed. The risks of long term opioid use reviewed with the patient.  \par \par She appears to be using medications reasonably and responsibly.  She denies side effects to medications-bm regular.  Her opiate agreement was reviewed and we discussed locked box for medications. \par \par She is to follow up with her epilepsy specialist-Dr. Hernandez, gastro, cardiologist, orthopedist, pcp, and oncologist.  \par \par She will RTO in one months time or sooner if clinically indicated. \par \par Dr. Alejo on site.  Billed incident to the service.  [Opioids] : Patient was explained in detail about pain control by using opioids. Patient has signed and fully understands our guidelines for medication and drug screening.  Patient understands the side effects of opioids, including, but not limited to, drug tolerance, dependence, potential for addiction. This class of drugs is habit-forming and ELIJAH regulated. The sedative effects of opioids can be potentiated by taking alcohol or any sleeping pills, along with opioids. The decision to drive is patient’s responsibility, as opioids can affect his/her driving ability and ability to concentrate. The long-term place is not clear, however, patient understands that once the pain control optimizes, the goal will be to wean off the opioids. All the issues regarding opioid treatment have been addressed satisfactorily.

## 2019-09-17 NOTE — PHYSICAL EXAM
[General Appearance - Alert] : alert [Person] : oriented to person [Place] : oriented to place [Motor Tone] : muscle tone was normal in all four extremities [Motor Strength] : muscle strength was normal in all four extremities [Balance] : balance was intact [Abnormal Walk] : normal gait [Sclera] : the sclera and conjunctiva were normal [Neck Appearance] : the appearance of the neck was normal [Arterial Pulses Carotid] : carotid pulses were normal with no bruits [Bowel Sounds] : normal bowel sounds [No CVA Tenderness] : no ~M costovertebral angle tenderness [Musculoskeletal - Swelling] : no joint swelling seen [Skin Color & Pigmentation] : normal skin color and pigmentation [Skin Turgor] : normal skin turgor [] : no rash

## 2019-09-18 ENCOUNTER — MEDICATION RENEWAL (OUTPATIENT)
Age: 64
End: 2019-09-18

## 2019-09-25 ENCOUNTER — APPOINTMENT (OUTPATIENT)
Dept: ORTHOPEDIC SURGERY | Facility: CLINIC | Age: 64
End: 2019-09-25
Payer: MEDICARE

## 2019-09-25 PROCEDURE — 99213 OFFICE O/P EST LOW 20 MIN: CPT

## 2019-09-25 PROCEDURE — 73650 X-RAY EXAM OF HEEL: CPT | Mod: LT

## 2019-09-25 NOTE — HISTORY OF PRESENT ILLNESS
[de-identified] : 63 year old female presents today for follow up of left calcaneal fracture sustained on 6/8/19. She has been complaint with CAM boot. She only has 5 sessions of PT. Rates her pain 7/10 and taking Norco. Still utilizing knee scooter. Reports sharp pains through the lower extremity and ankle. Pain mostly related to Achilles equinus contracture.

## 2019-09-25 NOTE — DISCUSSION/SUMMARY
[de-identified] : 63-year-old female with left calcaneal fracture\par \par Healed fracture as visible on updated x-ray imaging. Patient was told by her physical therapist that she may require additional imaging, but at this time recommendation is for continued conservative management. The patient likely has Achilles equinus contracture with some discomfort through the posterior heel from immobilization. Also, discussed degree of likely subtalar arthritic change.\par \par Recommendations made for continued conservative management. Continue PT. She can begin to weight bear on the foot without the CAM boot. Use of appropriate footwear was discussed. Heel pad. \par \par Followup 2 months.

## 2019-09-25 NOTE — PHYSICAL EXAM
[de-identified] : Oriented to time, place, person\par Mood: Normal\par Affect: Normal\par Appearance: Healthy, well appearing, no acute distress.\par Gait: Antalgic\par Assistive Devices: Scooter\par \par Left foot exam\par \par Skin: In tact\par Inspection: Mild swelling of posterior foot, no effusion\par Pulses: 2+ DP/PT pulses\par ROM: 10 degrees of dorsiflexion, 20 degrees of plantarflexion, limited subtalar motion. \par Tenderness: Mild Tender to the heel / achilles\par Stability: Stable \par Strength: Intact TA/GS/EHL\par Neuro: In tact to light touch throughout [de-identified] : The following radiographs were ordered and read by me during this patients visit. I reviewed each radiograph in detail with the patient and discussed the findings as highlighted below. \par \par 3 views of the left ankle/calcaneus were obtained today that show healed impacted displaced calcaneal fracture. Boeller angle ~30, gissane ~120.\par \par CT left foot dated 6.8.19 shows evidence of acute comminuted depressed calcaneal fracture. sponge only/May shower in 72 hours

## 2019-09-25 NOTE — ADDENDUM
[FreeTextEntry1] : This note was written by Kym Andrews on 09/25/2019 acting solely as a scribe for Dr. Arnoldo Moyer.\par \par All medical record entries made by the Scribe were at my, Dr. Arnoldo Moyer, direction and personally dictated by me on 09/25/2019. I have personally reviewed the chart and agree that the record accurately reflects my personal performance of the history, physical exam, assessment and plan.\par

## 2019-09-30 ENCOUNTER — RX RENEWAL (OUTPATIENT)
Age: 64
End: 2019-09-30

## 2019-10-01 ENCOUNTER — APPOINTMENT (OUTPATIENT)
Dept: CARDIOLOGY | Facility: CLINIC | Age: 64
End: 2019-10-01

## 2019-10-02 ENCOUNTER — MESSAGE (OUTPATIENT)
Age: 64
End: 2019-10-02

## 2019-10-15 ENCOUNTER — OUTPATIENT (OUTPATIENT)
Dept: OUTPATIENT SERVICES | Facility: HOSPITAL | Age: 64
LOS: 1 days | Discharge: HOME | End: 2019-10-15
Payer: MEDICARE

## 2019-10-15 DIAGNOSIS — R07.89 OTHER CHEST PAIN: ICD-10-CM

## 2019-10-15 LAB
ANION GAP SERPL CALC-SCNC: 13 MMO/L — SIGNIFICANT CHANGE UP (ref 7–14)
BUN SERPL-MCNC: 12 MG/DL — SIGNIFICANT CHANGE UP (ref 7–23)
CALCIUM SERPL-MCNC: 9.1 MG/DL — SIGNIFICANT CHANGE UP (ref 8.4–10.5)
CHLORIDE SERPL-SCNC: 104 MMOL/L — SIGNIFICANT CHANGE UP (ref 98–107)
CO2 SERPL-SCNC: 23 MMOL/L — SIGNIFICANT CHANGE UP (ref 22–31)
CREAT SERPL-MCNC: 0.59 MG/DL — SIGNIFICANT CHANGE UP (ref 0.5–1.3)
GLUCOSE SERPL-MCNC: 93 MG/DL — SIGNIFICANT CHANGE UP (ref 70–99)
HCT VFR BLD CALC: 34.4 % — LOW (ref 34.5–45)
HGB BLD-MCNC: 11.2 G/DL — LOW (ref 11.5–15.5)
MCHC RBC-ENTMCNC: 31.5 PG — SIGNIFICANT CHANGE UP (ref 27–34)
MCHC RBC-ENTMCNC: 32.6 % — SIGNIFICANT CHANGE UP (ref 32–36)
MCV RBC AUTO: 96.9 FL — SIGNIFICANT CHANGE UP (ref 80–100)
NRBC # FLD: 0 K/UL — SIGNIFICANT CHANGE UP (ref 0–0)
PLATELET # BLD AUTO: 187 K/UL — SIGNIFICANT CHANGE UP (ref 150–400)
PMV BLD: 9.2 FL — SIGNIFICANT CHANGE UP (ref 7–13)
POTASSIUM SERPL-MCNC: 3.9 MMOL/L — SIGNIFICANT CHANGE UP (ref 3.5–5.3)
POTASSIUM SERPL-SCNC: 3.9 MMOL/L — SIGNIFICANT CHANGE UP (ref 3.5–5.3)
RBC # BLD: 3.55 M/UL — LOW (ref 3.8–5.2)
RBC # FLD: 13.2 % — SIGNIFICANT CHANGE UP (ref 10.3–14.5)
SODIUM SERPL-SCNC: 140 MMOL/L — SIGNIFICANT CHANGE UP (ref 135–145)
WBC # BLD: 5.14 K/UL — SIGNIFICANT CHANGE UP (ref 3.8–10.5)
WBC # FLD AUTO: 5.14 K/UL — SIGNIFICANT CHANGE UP (ref 3.8–10.5)

## 2019-10-15 PROCEDURE — 99152 MOD SED SAME PHYS/QHP 5/>YRS: CPT

## 2019-10-15 PROCEDURE — 93010 ELECTROCARDIOGRAM REPORT: CPT

## 2019-10-15 PROCEDURE — 76937 US GUIDE VASCULAR ACCESS: CPT | Mod: 26

## 2019-10-15 PROCEDURE — 93454 CORONARY ARTERY ANGIO S&I: CPT | Mod: 26

## 2019-10-15 RX ORDER — CARBAMAZEPINE 200 MG
200 TABLET ORAL ONCE
Refills: 0 | Status: COMPLETED | OUTPATIENT
Start: 2019-10-15 | End: 2019-10-15

## 2019-10-15 RX ORDER — SODIUM CHLORIDE 9 MG/ML
3 INJECTION INTRAMUSCULAR; INTRAVENOUS; SUBCUTANEOUS EVERY 8 HOURS
Refills: 0 | Status: DISCONTINUED | OUTPATIENT
Start: 2019-10-15 | End: 2019-11-04

## 2019-10-15 RX ORDER — ACETAMINOPHEN 500 MG
650 TABLET ORAL ONCE
Refills: 0 | Status: DISCONTINUED | OUTPATIENT
Start: 2019-10-15 | End: 2019-11-04

## 2019-10-15 RX ADMIN — Medication 200 MILLIGRAM(S): at 11:54

## 2019-10-15 NOTE — H&P CARDIOLOGY - HISTORY OF PRESENT ILLNESS
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 Patient states that she occasionally experiences left sided chest pain but is not sure if it is cardiac related or from prior lumpectomies. Pt was sent for an echocardiogram which showed moderate MR, hypokinesis of the basal inferior + inferolateral walls and EF 51%. Patient has history of normal SPECT in the past so will elect for angiogram to r/o CAD. Pt denies N/V/D, fevers, chills, cough, palpitations, syncope, dyspnea on exertion, orthopnea, nocturnal paroxysmal dyspnea, edema, cyanosis, hypertension, heart murmurs, varicosities, phlebitis, claudication.

## 2019-10-15 NOTE — H&P CARDIOLOGY - RS GEN PE MLT RESP DETAILS PC
no chest wall tenderness/breath sounds equal/good air movement/respirations non-labored/clear to auscultation bilaterally/airway patent

## 2019-10-15 NOTE — H&P CARDIOLOGY - NEGATIVE CARDIOVASCULAR SYMPTOMS
no palpitations/no orthopnea/no paroxysmal nocturnal dyspnea/no dyspnea on exertion/no claudication/no peripheral edema

## 2019-10-18 ENCOUNTER — APPOINTMENT (OUTPATIENT)
Dept: PAIN MANAGEMENT | Facility: CLINIC | Age: 64
End: 2019-10-18
Payer: MEDICARE

## 2019-10-18 VITALS
SYSTOLIC BLOOD PRESSURE: 144 MMHG | WEIGHT: 105 LBS | HEIGHT: 64 IN | HEART RATE: 91 BPM | BODY MASS INDEX: 17.93 KG/M2 | DIASTOLIC BLOOD PRESSURE: 97 MMHG

## 2019-10-18 PROCEDURE — 99213 OFFICE O/P EST LOW 20 MIN: CPT

## 2019-10-21 ENCOUNTER — RESULT CHARGE (OUTPATIENT)
Age: 64
End: 2019-10-21

## 2019-10-21 ENCOUNTER — RESULT REVIEW (OUTPATIENT)
Age: 64
End: 2019-10-21

## 2019-10-29 NOTE — HISTORY OF PRESENT ILLNESS
[FreeTextEntry1] : Mrs. Tello returned today for a follow up office visit.  She continues under our care for treatment of her chronic pain.  She has a history of seiaure disorder. She takes medical transportation to and from her appointments.  \par \par Her pain is presently under control with her pain regimen- norco.  She tried medical marijuanna; however did not have much success.  VAS=5-6/10 with her medications.  She is presently disabled due to her seizure disorder but able to function at home.  She gets out of her house frequently and is quite active.   \par \par She follows with the epilepsy MDs for her seizures. She states that her calcaneous fracture is healing well.  She is no longer wearing the boot cast. \par \par No complaints of depression/suicidal ideations.  \par \par No other medical issues.

## 2019-10-29 NOTE — DISCUSSION/SUMMARY
[Opioids] : Patient was explained in detail about pain control by using opioids. Patient has signed and fully understands our guidelines for medication and drug screening.  Patient understands the side effects of opioids, including, but not limited to, drug tolerance, dependence, potential for addiction. This class of drugs is habit-forming and [facility]IJAH regulated. The sedative effects of opioids can be potentiated by taking alcohol or any sleeping pills, along with opioids. The decision to drive is patient’s responsibility, as opioids can affect his/her driving ability and ability to concentrate. The long-term place is not clear, however, patient understands that once the pain control optimizes, the goal will be to wean off the opioids. All the issues regarding opioid treatment have been addressed satisfactorily.  [FreeTextEntry1] : [facility] registry reviewed (#[id number]).  No signs of toxicity noted.  Will continue to monitor.  Her prescription for Norco 10/325 mgs was renewed without changes.  The risks of long term opioid use reviewed with the patient.  \par \par No abberrant behavior noted.  She appears to be using medications reasonably and responsibly. Her opiate agreement was reviewed and we discussed locked box for medications. She denies side effects to medications-bm regular. \par \par She will RTO in one months time or sooner if clinically indicated. \par \par Dr. Murphy on site.  Billed incident to the service.

## 2019-11-14 ENCOUNTER — MEDICATION RENEWAL (OUTPATIENT)
Age: 64
End: 2019-11-14

## 2019-11-15 ENCOUNTER — APPOINTMENT (OUTPATIENT)
Dept: PAIN MANAGEMENT | Facility: CLINIC | Age: 64
End: 2019-11-15
Payer: MEDICARE

## 2019-11-15 VITALS
SYSTOLIC BLOOD PRESSURE: 129 MMHG | HEIGHT: 64 IN | WEIGHT: 106 LBS | HEART RATE: 93 BPM | BODY MASS INDEX: 18.1 KG/M2 | DIASTOLIC BLOOD PRESSURE: 77 MMHG

## 2019-11-15 PROCEDURE — 99213 OFFICE O/P EST LOW 20 MIN: CPT

## 2019-11-15 NOTE — PHYSICAL EXAM
[Person] : oriented to person [General Appearance - Alert] : alert [Motor Tone] : muscle tone was normal in all four extremities [Place] : oriented to place [Motor Strength] : muscle strength was normal in all four extremities [Abnormal Walk] : normal gait [Sclera] : the sclera and conjunctiva were normal [Balance] : balance was intact [Neck Appearance] : the appearance of the neck was normal [Arterial Pulses Carotid] : carotid pulses were normal with no bruits [No CVA Tenderness] : no ~M costovertebral angle tenderness [Bowel Sounds] : normal bowel sounds [Musculoskeletal - Swelling] : no joint swelling seen [] : no rash [Skin Color & Pigmentation] : normal skin color and pigmentation [Skin Turgor] : normal skin turgor

## 2019-11-15 NOTE — HISTORY OF PRESENT ILLNESS
[FreeTextEntry1] : Mrs. Tello returned today for a follow up office visit for her chronic pain.  She has a history of seizures and is followed here by her epileptologist as well.  She does not drive. She takes medical transportation to and from her appointments.  \par \par Her pain is presently well controlled with the combination of norco and medical marijuanna.  CYNDY=4-6/10 with her medications.  She is disabled due to her seizure disorder but able to function at home.  She  is quite active around the home.   \par \par No complaints of depression/suicidal ideations.  \par \par No other medical complaints.

## 2019-11-15 NOTE — DISCUSSION/SUMMARY
[FreeTextEntry1] :  registry reviewed.  No signs of toxicity noted.  Will continue to monitor.  Her prescription for Norco was renewed.  The risks of long term opioid use reviewed with the patient.  The patient continues to feel that the benefits outweigh the risks. \par \par No abberrant behavior noted.  She appears to be using medications reasonably and responsibly. Her opiate agreement was reviewed and we discussed locked box for medications. She denies side effects to medications-bm regular. \par \par She will RTO in one months time or sooner if clinically indicated. \par \par Dr. Murphy on site.  Billed incident to the service.  [Opioids] : Patient was explained in detail about pain control by using opioids. Patient has signed and fully understands our guidelines for medication and drug screening.  Patient understands the side effects of opioids, including, but not limited to, drug tolerance, dependence, potential for addiction. This class of drugs is habit-forming and ELIJAH regulated. The sedative effects of opioids can be potentiated by taking alcohol or any sleeping pills, along with opioids. The decision to drive is patient’s responsibility, as opioids can affect his/her driving ability and ability to concentrate. The long-term place is not clear, however, patient understands that once the pain control optimizes, the goal will be to wean off the opioids. All the issues regarding opioid treatment have been addressed satisfactorily.

## 2019-11-19 ENCOUNTER — APPOINTMENT (OUTPATIENT)
Dept: SURGICAL ONCOLOGY | Facility: CLINIC | Age: 64
End: 2019-11-19
Payer: MEDICARE

## 2019-11-19 ENCOUNTER — MEDICATION RENEWAL (OUTPATIENT)
Age: 64
End: 2019-11-19

## 2019-11-19 VITALS
HEART RATE: 88 BPM | DIASTOLIC BLOOD PRESSURE: 80 MMHG | SYSTOLIC BLOOD PRESSURE: 123 MMHG | OXYGEN SATURATION: 99 % | WEIGHT: 106 LBS | BODY MASS INDEX: 18.1 KG/M2 | RESPIRATION RATE: 16 BRPM | HEIGHT: 64 IN

## 2019-11-19 PROCEDURE — 99214 OFFICE O/P EST MOD 30 MIN: CPT

## 2019-11-19 NOTE — HISTORY OF PRESENT ILLNESS
[de-identified] : 64 year old female presents for a  follow up visit for her annual breast exam. Pt had a RT breast US guided biopsy in June which showed non-proliferative fibrocystic changes. \par \par Pathology 6/14/19:\par RT breast US-guided biopsy revealed non-proliferative fibrocystic changes with dense stromal fibrosis and mild adenosis. \par \par MMG/US 6/4/19:\par No mammographic evidence of malignancy. \par US revealed 10:00, 9 cm from the nipple, newly seen 5 x 3 x 2 mm hypoechoic shadowing mass. Recommendation: mass should be further evaluated with ultrasound-guided biopsy. \par No sonographic evidence of malignancy in the left breast. \par BI-RADS 4A -Suspicious Finding(s) -Low Suspicion for Malignancy\par \par Pertinent Hx: \par Her history is notable for a left breast lumpectomy (1998) for stage 1 left breast cancer with adjuvant chemotherapy and radiation therapy. She suffered a recurrence and had re excision twice, and then completed 5 years of tamoxifen and 5 years of Femara under the supervision of Dr. Murray. Family history of breast cancer involves her maternal aunt. Patient is BRCA Negative. \par MMG/US 05/24/18: No mammographic evidence of malignancy. BIRADS 2.\par \par Today, on 11/19/19,  the pt was without any complaints. Denies palpable breast masses, nipple discharge, skin changes, inversion or breast pain. Denies constitutional symptoms. She recently had an angiogram performed, but no stent was recommended. She states that she had 3 seizures this past  on 11/17/19. She hurt her leg and will be seeing a podiatrist soon.

## 2019-11-19 NOTE — PHYSICAL EXAM
[Normal] : supple, no neck mass and thyroid not enlarged [Normal Neck Lymph Nodes] : normal neck lymph nodes  [Normal Groin Lymph Nodes] : normal groin lymph nodes [Normal] : oriented to person, place and time, with appropriate affect [FreeTextEntry1] : I, Chai Mendoza, was present for the physical exam.\par \par \par \par \par  [de-identified] : Normal S1, S2. Regular rate and rhythm\par \par  [de-identified] : Complete normal breast examination performed supine and upright revealed no palpable masses, nipple discharge, inversion, deviation or enlarge axillary lymph nodes, or supraclavicular lymph nodes [de-identified] : Clear breath sounds bilaterally, normal respiratory effort\par \par

## 2019-11-19 NOTE — ASSESSMENT
[FreeTextEntry1] : Imp:\par No evidence of new or recurrent lesions. Breast imaging failed to identify any suspicious lesions. No suspicious lesions on exam\par \par Plan:\par Continue yearly surveillance with MMG/US scheduled in June 2020. \par \par \par

## 2019-11-19 NOTE — ADDENDUM
[FreeTextEntry1] : I, Chai Mendoza, acted soley as a scribe for Dr. Calvin Landaverde on 11/19/2019\par \par

## 2019-12-02 ENCOUNTER — APPOINTMENT (OUTPATIENT)
Dept: ORTHOPEDIC SURGERY | Facility: CLINIC | Age: 64
End: 2019-12-02
Payer: MEDICARE

## 2019-12-02 PROCEDURE — 99213 OFFICE O/P EST LOW 20 MIN: CPT

## 2019-12-02 PROCEDURE — 73650 X-RAY EXAM OF HEEL: CPT | Mod: LT

## 2019-12-10 ENCOUNTER — OUTPATIENT (OUTPATIENT)
Dept: OUTPATIENT SERVICES | Facility: HOSPITAL | Age: 64
LOS: 1 days | Discharge: HOME | End: 2019-12-10

## 2019-12-10 ENCOUNTER — MEDICATION RENEWAL (OUTPATIENT)
Age: 64
End: 2019-12-10

## 2019-12-10 DIAGNOSIS — D64.9 ANEMIA, UNSPECIFIED: ICD-10-CM

## 2019-12-11 NOTE — PHYSICAL EXAM
[de-identified] : Oriented to time, place, person\par Mood: Normal\par Affect: Normal\par Appearance: Healthy, well appearing, no acute distress.\par Gait: Antalgic\par \par Left foot exam\par \par Skin: In tact\par Inspection: Mild swelling of posterior foot, no effusion\par Pulses: 2+ DP/PT pulses\par ROM: 10 degrees of dorsiflexion, 30 degrees of plantarflexion\par Tenderness: Mild Tender to the heel / achilles\par Stability: Stable \par Strength: Intact TA/GS/EHL\par Neuro: In tact to light touch throughout [de-identified] : The following radiographs were ordered and read by me during this patients visit. I reviewed each radiograph in detail with the patient and discussed the findings as highlighted below. \par \par 3 views of the left ankle/calcaneus were obtained today that show healed impacted displaced calcaneal fracture. Boeller angle ~30, gissane ~120.\par \par CT left foot dated 6.8.19 shows evidence of acute comminuted depressed calcaneal fracture.

## 2019-12-11 NOTE — DISCUSSION/SUMMARY
[de-identified] : 64-year-old female with left calcaneal fracture\par \par Healed fracture as visible on updated x-ray imaging. Discussed degree of likely subtalar arthritic change given injury pattern to the left calcaneus as well as deformity to the hindfoot.\par \par Recommendations made for continued symptommatic care. Continue PT/HEP. Heel pad. Appropriate shoe wear\par \par Followup 3-4mos

## 2019-12-11 NOTE — HISTORY OF PRESENT ILLNESS
[de-identified] : 63 year old female presents today for follow up of left calcaneal fracture sustained on 6/8/19. She is ambulating with orthotic shoe. Reports pain with weather changes. She is /attending PT and exercises on her own. She had two seizures 10/17/19.  Rates her pain 7/10 and taking Norco. Continued pain in the heel and ankle.

## 2019-12-13 ENCOUNTER — RX RENEWAL (OUTPATIENT)
Age: 64
End: 2019-12-13

## 2019-12-16 ENCOUNTER — APPOINTMENT (OUTPATIENT)
Dept: NEUROLOGY | Facility: CLINIC | Age: 64
End: 2019-12-16
Payer: MEDICARE

## 2019-12-16 VITALS
HEIGHT: 64 IN | WEIGHT: 100 LBS | HEART RATE: 80 BPM | BODY MASS INDEX: 17.07 KG/M2 | SYSTOLIC BLOOD PRESSURE: 136 MMHG | DIASTOLIC BLOOD PRESSURE: 78 MMHG

## 2019-12-16 PROCEDURE — 99214 OFFICE O/P EST MOD 30 MIN: CPT
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
## 2019-12-16 NOTE — DISCUSSION/SUMMARY
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  We discussed the possibility of epilepsy sx in detail.  She had neuropsych testing which showed diffuse cerebral dysfunction, right greater than left.\par \par Plan:\par - Taking Clobazam 40mg bid and doing better\par - Discussed risks of CBZ in osteoporosis, but risk of stopping medication high in increasing her seizures.  She knows risk of fracture at this time and is being treated by her PMD. \par - encouraged 3 servings Ca+ in diet and low weight bearing exercises\par - reviewed seizure triggers\par - check CMP, CBC, CBZ (CMP and CBC going to be checked by PMD).\par -follow up in 2-3 months.\par \par Patient seen 25 min face to face with >50% in counseling and discussion.\par \par . [Medically Refractory (seizure within the last year)] : Medically Refractory (seizure within the last year) [Complex Partial] : complex partial [Symptomatic] : symptomatic [Risks Associated with Driving/[place] Law] : As per my usual protocol, the patient was advised in regards to risks and driving privileges associated with the [place] Guidelines.  [Safety Recommendations] : The patient was advised in regards to the risk of seizures and general seizure safety recommendations including not to be bathing alone, climbing to high places and operating heavy machinery. [Bone Health Education] : Patient was educated in regards to bone health and an increased risk of osteoporosis in patients with epilepsy. [Compliance with Medications] : The importance of compliance with medications was reinforced. [Sleep Hygiene/Sleep Disruption Risks] : Sleep hygiene and the risks of sleep disruption were discussed. [Surgical Options/Risks/Benefits] : Surgical options/risks/benefits for intractable epilepsy were discussed. [Opioids] : Patient was explained in detail about pain control by using opioids. Patient has signed and fully understands our guidelines for medication and drug screening.  Patient understands the side effects of opioids, including, but not limited to, drug tolerance, dependence, potential for addiction. This class of drugs is habit-forming and ELIAJH regulated. The sedative effects of opioids can be potentiated by taking alcohol or any sleeping pills, along with opioids. The decision to drive is patient’s responsibility, as opioids can affect his/her driving ability and ability to concentrate. The long-term place is not clear, however, patient understands that once the pain control optimizes, the goal will be to wean off the opioids. All the issues regarding opioid treatment have been addressed satisfactorily.  [Risk of Death] : Risk of death associated with seizures / SUDEP was discussed.

## 2019-12-16 NOTE — PHYSICAL EXAM
[General Appearance - Alert] : alert [General Appearance - In No Acute Distress] : in no acute distress [Person] : oriented to person [Place] : oriented to place [Time] : oriented to time [Short Term Intact] : short term memory intact [Registration Intact] : recent registration memory intact [Remote Intact] : remote memory intact [Span Intact] : the attention span was normal [Visual Intact] : visual attention was ~T not ~L decreased [Concentration Intact] : normal concentrating ability [Naming Objects] : no difficulty naming common objects [Repeating Phrases] : no difficulty repeating a phrase [Writing A Sentence] : no difficulty writing a sentence [Fluency] : fluency intact [Comprehension] : comprehension intact [Reading] : reading intact [Past History] : adequate knowledge of personal past history [Current Events] : adequate knowledge of current events [Cranial Nerves Optic (II)] : visual acuity intact bilaterally,  visual fields full to confrontation, pupils equal round and reactive to light [Vocabulary] : adequate range of vocabulary [Cranial Nerves Oculomotor (III)] : extraocular motion intact [Cranial Nerves Vestibulocochlear (VIII)] : hearing was intact bilaterally [Cranial Nerves Facial (VII)] : face symmetrical [Cranial Nerves Trigeminal (V)] : facial sensation intact symmetrically [Cranial Nerves Accessory (XI - Cranial And Spinal)] : head turning and shoulder shrug symmetric [Cranial Nerves Glossopharyngeal (IX)] : tongue and palate midline [Cranial Nerves Hypoglossal (XII)] : there was no tongue deviation with protrusion [No Muscle Atrophy] : normal bulk in all four extremities [Motor Strength] : muscle strength was normal in all four extremities [Sensation Vibration Decrease] : vibration was intact [Sensation Tactile Decrease] : light touch was intact [Sensation Pain / Temperature Decrease] : pain and temperature was intact [Balance] : balance was intact [Proprioception] : proprioception was intact [Past-pointing] : there was no past-pointing [Tremor] : no tremor present [2+] : Ankle jerk right 2+ [Plantar Reflex Right Only] : normal on the right [Plantar Reflex Left Only] : normal on the left [Sclera] : the sclera and conjunctiva were normal [PERRL With Normal Accommodation] : pupils were equal in size, round, reactive to light, with normal accommodation [Extraocular Movements] : extraocular movements were intact [Neck Appearance] : the appearance of the neck was normal [Arterial Pulses Carotid] : carotid pulses were normal with no bruits [Full Pulse] : the pedal pulses are present [Edema] : there was no peripheral edema [Bowel Sounds] : normal bowel sounds [No CVA Tenderness] : no ~M costovertebral angle tenderness [Abnormal Walk] : normal gait [Musculoskeletal - Swelling] : no joint swelling seen [Motor Tone] : muscle strength and tone were normal [Nail Clubbing] : no clubbing  or cyanosis of the fingernails [Skin Color & Pigmentation] : normal skin color and pigmentation [FreeTextEntry1] : Increased pain left arm with limited ROM [Skin Turgor] : normal skin turgor [] : no rash

## 2019-12-17 ENCOUNTER — APPOINTMENT (OUTPATIENT)
Dept: HEMATOLOGY ONCOLOGY | Facility: CLINIC | Age: 64
End: 2019-12-17
Payer: MEDICARE

## 2019-12-17 VITALS
TEMPERATURE: 97.6 F | SYSTOLIC BLOOD PRESSURE: 126 MMHG | DIASTOLIC BLOOD PRESSURE: 78 MMHG | OXYGEN SATURATION: 97 % | HEART RATE: 74 BPM | BODY MASS INDEX: 17.6 KG/M2 | WEIGHT: 102.51 LBS | RESPIRATION RATE: 16 BRPM

## 2019-12-17 PROCEDURE — 99213 OFFICE O/P EST LOW 20 MIN: CPT

## 2019-12-19 ENCOUNTER — APPOINTMENT (OUTPATIENT)
Dept: INTERNAL MEDICINE | Facility: CLINIC | Age: 64
End: 2019-12-19
Payer: MEDICARE

## 2019-12-19 VITALS
BODY MASS INDEX: 17.07 KG/M2 | HEIGHT: 64 IN | TEMPERATURE: 97.5 F | WEIGHT: 100 LBS | DIASTOLIC BLOOD PRESSURE: 80 MMHG | SYSTOLIC BLOOD PRESSURE: 110 MMHG | HEART RATE: 74 BPM | OXYGEN SATURATION: 98 %

## 2019-12-19 PROCEDURE — G0439: CPT | Mod: 25

## 2019-12-19 PROCEDURE — G0008: CPT

## 2019-12-19 PROCEDURE — 90686 IIV4 VACC NO PRSV 0.5 ML IM: CPT

## 2019-12-19 NOTE — HISTORY OF PRESENT ILLNESS
[FreeTextEntry1] : wellness check \par \par seizures - still poorly controlled\par compliant with meds \par has seen oncology \par not on any meds for the OP bc she has been trying to get dental work \par (+) h/o falls , during seizure \par uses cane bc ankle pain 2/2 fall - getting better

## 2019-12-19 NOTE — REVIEW OF SYSTEMS
[Recent Change In Weight] : ~T recent weight change [Back Pain] : back pain [Muscle Pain] : muscle pain [Memory Loss] : memory loss [Negative] : Psychiatric [Unsteady Walking] : no ataxia

## 2019-12-19 NOTE — ASSESSMENT
[FreeTextEntry1] : Wellness check\par - diet/ exercise discussed \par - fall risk / home safety discussed \par - mammo - due\par - needs pap \par - will not do BMD bc she refuses meds at present\par - refuses CT for lung cancer screening \par - check lipid \par - flu shot \par - UTD tDAP \par

## 2019-12-19 NOTE — HEALTH RISK ASSESSMENT
[Fair] :  ~his/her~ mood as fair [No] : No [Two or more falls in past year] : Patient reported two or more falls in the past year [0] : 1) Little interest or pleasure doing things: Not at all (0) [1] : 2) Feeling down, depressed, or hopeless for several days (1) [Patient declined Low Dose CT Scan] : Patient declined Low Dose CT Scan [Patient reported mammogram was normal] : Patient reported mammogram was normal [Transportation] : transportation [Unemployed] : unemployed [] :  [Sexually Active] : sexually active [Feels Safe at Home] : Feels safe at home [Reports changes in dental health] : Reports changes in dental health [Reports changes in vision] : Reports changes in vision [Smoke Detector] : smoke detector [Carbon Monoxide Detector] : carbon monoxide detector [Seat Belt] :  uses seat belt [Safety elements used in home] : safety elements used in home [With Patient/Caregiver] : With Patient/Caregiver [Relationship: ___] : Relationship: [unfilled] [de-identified] : 30-35 pack years  [de-identified] : none [de-identified] : regular [Change in mental status noted] : No change in mental status noted [Reports changes in hearing] : Reports no changes in hearing [BoneDensityDate] : 2017  [MammogramDate] : 2019 [BoneDensityComments] : OP - cannot take meds  [ColonoscopyDate] : 2017  [ColonoscopyComments] : repeat 2022 [de-identified] : needs help w/ cooking  [de-identified] : needs help with transport [AdvancecareDate] : 12/19/2019

## 2019-12-20 NOTE — PHYSICAL EXAM
[Normal] : grossly intact [Ambulatory and capable of all self care but unable to carry out any work activities] : Status 2- Ambulatory and capable of all self care but unable to carry out any work activities. Up and about more than 50% of waking hours [de-identified] : using cane, walks slow [de-identified] : no masses or adenopathy palpable b/l, scar tissue noted b/l breasts [de-identified] : L foot in boot/wrapped

## 2019-12-20 NOTE — ASSESSMENT
[FreeTextEntry1] : 63yo woman diagnosed with Stage 1 breast Ca in 1998. treated with CMF, Lumpectomy and RT. Was started on tamoxifen in [id number] then femara in 10/04-11/09. On surveillance and TERE.\par \par -Follow up Dr. Landaverde, continue surveillance imaging as recommended next 5-6/2020\par -Advised follow up with endocrinology, to consider bone therapies soumya in the setting of osteoporosis and fracture, discussed extensively again\par -RTO 6 months, sooner if issues or concerns, discussed referral to survivorship clinic at that time and she is amenable to this\par -Continue RHM, PMD follow up, all reviewed

## 2019-12-20 NOTE — HISTORY OF PRESENT ILLNESS
[de-identified] : Stage 1 breast Ca in 1998. treated with CMF, Lumpectomy and RT\par Was started on tamoxifen in 1999- 2004 then femara in 10/04-11/09.\par  [de-identified] : Bilateral mammography and ultrasonography 6/4/19 birads 4A - R 10:00 [id number] hypoechoic mass. 6/14/19: R 10:00 FN biopsied, fibrocystic change\par Saw Dr Saima CARRANZA 11/2019\par Dr. Hernandez - seizure management. She had a seizure at mammogram - left heel fracture, in boot, seeing Dr. Moyer orthopedics for follow up soon\par 10/17 DEXA - osteoporosis, follows with Dr. Greco endocrinology, has had dental work recently, plan for repeat soon and follow up\par 6/12/18 CT chest stable pulmonary nodules, smoking\par PMD Dr. Blanc - MRI Abd for adenoma monitoring, follows every 3 months\par Dr. Enriquez pain management - on stable regimen for headaches due to prior brain surgery and seizures\par GI Dr. Rios 11/2017 Cscope with polyp next 5 years, EGD 4/2018 gastritis\par \par She notes feeling generally well, ankle still bothers her. No other issues or complaints.

## 2020-01-06 ENCOUNTER — FORM ENCOUNTER (OUTPATIENT)
Age: 65
End: 2020-01-06

## 2020-01-07 ENCOUNTER — APPOINTMENT (OUTPATIENT)
Dept: MAMMOGRAPHY | Facility: IMAGING CENTER | Age: 65
End: 2020-01-07

## 2020-01-07 ENCOUNTER — APPOINTMENT (OUTPATIENT)
Dept: ULTRASOUND IMAGING | Facility: IMAGING CENTER | Age: 65
End: 2020-01-07

## 2020-01-07 ENCOUNTER — OUTPATIENT (OUTPATIENT)
Dept: OUTPATIENT SERVICES | Facility: HOSPITAL | Age: 65
LOS: 1 days | End: 2020-01-07
Payer: COMMERCIAL

## 2020-01-07 DIAGNOSIS — Z00.00 ENCOUNTER FOR GENERAL ADULT MEDICAL EXAMINATION WITHOUT ABNORMAL FINDINGS: ICD-10-CM

## 2020-01-07 PROCEDURE — 76642 ULTRASOUND BREAST LIMITED: CPT

## 2020-01-07 PROCEDURE — 76642 ULTRASOUND BREAST LIMITED: CPT | Mod: 26,RT

## 2020-01-11 LAB
25(OH)D3 SERPL-MCNC: 20.4 NG/ML
CARBAMAZEPINE SERPL-MCNC: 11.5 UG/ML
CHOLEST SERPL-MCNC: 198 MG/DL
CHOLEST/HDLC SERPL: 4.2 RATIO
HDLC SERPL-MCNC: 47 MG/DL
LDLC SERPL CALC-MCNC: 120 MG/DL
TRIGL SERPL-MCNC: 156 MG/DL

## 2020-01-15 ENCOUNTER — APPOINTMENT (OUTPATIENT)
Dept: PAIN MANAGEMENT | Facility: CLINIC | Age: 65
End: 2020-01-15
Payer: MEDICARE

## 2020-01-15 VITALS
BODY MASS INDEX: 17.07 KG/M2 | WEIGHT: 100 LBS | HEART RATE: 75 BPM | SYSTOLIC BLOOD PRESSURE: 115 MMHG | HEIGHT: 64 IN | DIASTOLIC BLOOD PRESSURE: 76 MMHG

## 2020-01-15 PROCEDURE — 99213 OFFICE O/P EST LOW 20 MIN: CPT

## 2020-01-15 NOTE — HISTORY OF PRESENT ILLNESS
[FreeTextEntry1] : Misty presented to our office today for a follow up office visit.  She is requesting medication renewals.  She states that her pain has been consistent.  Her medications continue to provide both decrease pain and increase in functioning.  CYNDY=4-5/10 with her medications.  She reports both decrease pain and increase in her functioning.  \par \par She has a history of seizures and is followed here by her epileptologist as well.  She does not drive. She takes medical transportation to and from her appointments.  \par \par  She is disabled due to her seizure disorder but able to function at home.  She  is quite active around the home.   She gets out of her house regularly and is independent. \par \par No complaints of depression/suicidal ideations.  \par \par No other medical complaints.

## 2020-01-15 NOTE — DISCUSSION/SUMMARY
[FreeTextEntry1] :  registry reviewed.  No signs of toxicity noted.  Will continue to monitor.  She will continue on her current medication for now without change.  Her prescription for [place] was sent to her pharmacy.  The risks of long term opioid use reviewed with the patient.  \par \par She appears to be using medications reasonably and responsibly. No abberrant behavior noted. Her opiate agreement was reviewed and we discussed locked box for medications. She denies side effects to medications-bm regular. \par \par She will RTO in one months time or sooner if clinically indicated.  She will continue follow up appts with her epileptologist as well. \par \par Dr. Murphy on site.  Billed incident to the service.  [Opioids] : Patient was explained in detail about pain control by using opioids. Patient has signed and fully understands our guidelines for medication and drug screening.  Patient understands the side effects of opioids, including, but not limited to, drug tolerance, dependence, potential for addiction. This class of drugs is habit-forming and ELIJAH regulated. The sedative effects of opioids can be potentiated by taking alcohol or any sleeping pills, along with opioids. The decision to drive is patient’s responsibility, as opioids can affect his/her driving ability and ability to concentrate. The long-term place is not clear, however, patient understands that once the pain control optimizes, the goal will be to wean off the opioids. All the issues regarding opioid treatment have been addressed satisfactorily.

## 2020-01-15 NOTE — REASON FOR VISIT
[Follow-Up: _____] : a [unfilled] follow-up visit [FreeTextEntry1] : Chronic isolated lower back pain and headaches.  [Other: _____] : [unfilled]

## 2020-01-15 NOTE — PHYSICAL EXAM
[Person] : oriented to person [General Appearance - Alert] : alert [Place] : oriented to place [Motor Tone] : muscle tone was normal in all four extremities [Motor Strength] : muscle strength was normal in all four extremities [Abnormal Walk] : normal gait [Balance] : balance was intact [Neck Appearance] : the appearance of the neck was normal [Sclera] : the sclera and conjunctiva were normal [Bowel Sounds] : normal bowel sounds [Arterial Pulses Carotid] : carotid pulses were normal with no bruits [Musculoskeletal - Swelling] : no joint swelling seen [No CVA Tenderness] : no ~M costovertebral angle tenderness [] : no rash [Skin Turgor] : normal skin turgor [Skin Color & Pigmentation] : normal skin color and pigmentation

## 2020-02-05 ENCOUNTER — NON-APPOINTMENT (OUTPATIENT)
Age: 65
End: 2020-02-05

## 2020-02-05 ENCOUNTER — APPOINTMENT (OUTPATIENT)
Dept: CARDIOLOGY | Facility: CLINIC | Age: 65
End: 2020-02-05
Payer: MEDICARE

## 2020-02-05 VITALS
OXYGEN SATURATION: 97 % | HEART RATE: 74 BPM | BODY MASS INDEX: 17.07 KG/M2 | RESPIRATION RATE: 12 BRPM | HEIGHT: 64 IN | DIASTOLIC BLOOD PRESSURE: 69 MMHG | SYSTOLIC BLOOD PRESSURE: 120 MMHG | WEIGHT: 100 LBS

## 2020-02-05 VITALS — SYSTOLIC BLOOD PRESSURE: 115 MMHG | HEART RATE: 83 BPM | OXYGEN SATURATION: 97 % | DIASTOLIC BLOOD PRESSURE: 65 MMHG

## 2020-02-05 VITALS — DIASTOLIC BLOOD PRESSURE: 75 MMHG | HEART RATE: 83 BPM | OXYGEN SATURATION: 97 % | SYSTOLIC BLOOD PRESSURE: 124 MMHG

## 2020-02-05 VITALS — HEART RATE: 76 BPM | DIASTOLIC BLOOD PRESSURE: 67 MMHG | SYSTOLIC BLOOD PRESSURE: 115 MMHG | OXYGEN SATURATION: 97 %

## 2020-02-05 PROCEDURE — 99215 OFFICE O/P EST HI 40 MIN: CPT

## 2020-02-05 PROCEDURE — 93000 ELECTROCARDIOGRAM COMPLETE: CPT

## 2020-02-05 PROCEDURE — 93040 RHYTHM ECG WITH REPORT: CPT | Mod: 59

## 2020-02-05 RX ORDER — CALCIUM CARBONATE/VITAMIN D3 600 MG-10
1200 TABLET ORAL
Qty: 90 | Refills: 3 | Status: DISCONTINUED | COMMUNITY
Start: 2017-03-03 | End: 2020-02-05
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
## 2020-02-05 NOTE — DISCUSSION/SUMMARY
[FreeTextEntry1] : \par SMOKING CESSATION:\par We all know the health risks of smoking, and most of us know that kicking the habit is the single biggest improvement to health a smoker can make. But that doesn’t make it any easier to kick the habit. Whether you’re a teen smoker or a lifetime pack-a-day smoker, quitting can be tough.\par To increase your chances of success, you need to be motivated, have social support, an understanding of what to expect, and a personal game plan. It is possible to learn how to replace your smoking habits, manage your cravings, and join the millions of people who have kicked the habit for good.\par Smoking tobacco is both a psychological habit and a physical addiction. The act of smoking is ingrained as a daily ritual and, at the same time, the nicotine from cigarettes provides a temporary, and addictive, high. Eliminating that regular fix of nicotine will cause your body to experience physical withdrawal symptoms and cravings. To successfully quit smoking, you’ll need to address both the habit and the addiction by changing your behavior and dealing with nicotine withdrawal symptoms.\par Relieving unpleasant and overwhelming feelings without cigarettes\par Managing unpleasant feelings such as stress, depression, loneliness, fear, and anxiety are some of the most common reasons why adults smoke. When you have a bad day, it can seem like your cigarettes are your only friend. Smoking can temporarily make feelings such as sadness, stress, anxiety, depression, and boredom evaporate into thin air. As much comfort as cigarettes provide, though, it’s important to remember that there are healthier (and more effective) ways to keep unpleasant feelings in check. These may include exercising, meditating, using sensory relaxation strategies, and practicing simple breathing exercises. \par For many people, an important aspect of quitting smoking is to find alternate ways to handle these difficult feelings without smoking. Even when cigarettes are no longer a part of your life, the painful and unpleasant feelings that may have prompted you to smoke in the past will still remain. So, it’s worth spending some time thinking about the different ways you intend to deal with stressful situations and the daily irritations that would normally have you reaching for a cigarette.\par Tailoring a personal game plan to your specific needs and desires can be a big help. List the reasons why you want to quit and then keep copies of the list in the places where you’d normally keep your cigarettes, such as in your jacket, purse, or car. Your reasons for quitting smoking might include:\par I will feel healthier and have more energy, whiter teeth and fresher breath.\par I will lower my risk for cancer, heart attacks, strokes, early death, cataracts, and skin wrinkling.\par I will make myself and my partner, friends, and family proud of me.\par I will no longer expose my children and others to the dangers of my second-hand smoke.\par I will have a healthier baby (If you or your partner is pregnant).\par I will have more money to spend.\par I won't have to worry: "When will I get to smoke next?"\par Questions to ask yourself\par To successfully detach from smoking, you will need to identify and address your smoking habits, the true nature of your dependency, and the techniques that work for you. These types of questions can help:\par Do you feel the need to smoke at every meal?\par Are you more of a social smoker?\par Is it a very bad addiction (more than a pack a day)? Or would a simple nicotine patch do the job?\par Is your cigarette smoking linked to other addictions, such as alcohol or gambling?\par Are you open to hypnotherapy and/or acupuncture?\par Are you someone who is open to talking about your addiction with a therapist or counselor?\par Are you interested in getting into a fitness program?\par Take the time to think of what kind of smoker you are, which moments of your life call for a cigarette, and why. This will help you to identify which tips, techniques or therapies may be most beneficial for you. \par Start your stop smoking plan with START\par S = Set a quit date.\par T = Tell family, friends, and co-workers that you plan to quit.\par A = Anticipate and plan for the challenges you'll face while quitting.\par R = Remove cigarettes and other tobacco products from your home, car, and work.\par T = Talk to your doctor about getting help to quit.\par \par After quitting, you may feel dizzy, restless, or even have strong headaches because you’re lacking the immediate release of sugar that comes from nicotine. You may also have a bigger appetite. These sugar-related cravings should only last a few days until your body adjusts so keep your sugar levels a bit higher than usual on those days by drinking plenty of juice (unless you’re a diabetic). It will help prevent the craving symptoms and help your body re-adjust back to normal.\par Tips for managing other cigarette cravings\par Cravings associated with meals\par For some smokers, ending a meal means lighting up, and the prospect of giving that up may appear daunting. TIP: replace that moment after a meal with something such as a piece of fruit, a (healthy) dessert, a square of chocolate, or a stick of gum.\par \par Alcohol and cigarettes\par Many people have a habit of smoking when they have an alcoholic drink. TIP: try non-alcoholic drinks, or try drinking only in bars, restaurant-bars, or friends’ houses where smoking inside is prohibited. Or try snacking on nuts and chips, or chewing on a straw or cocktail stick.\par \par Cravings associated with social smoking\par When friends, family, and co-workers smoke around you, it is doubly difficult to quit or avoid relapse. TIP: Your social circles need to know that you are changing your habits so talk about your decision to quit. Let them know they won’t be able to smoke when you’re in the car with them or taking a coffee break together. \par \par In your workplace, don’t take all your coffee breaks with smokers only, do something else instead, or find non-smokers to have your breaks with.\par Additional tips to deal with cravings and withdrawal symptoms\par Stay active: Keep yourself distracted and occupied, go for walks.\par Keep your hands/fingers busy: Squeeze balls, pencils, or paper clips are good substitutes to satisfy that need for tactile stimulation.\par Keep your mind busy: Read a book or magazine, listen to some music you love.\par Find an oral substitute: Keep other things around to pop in your mouth when you’re craving a cigarette.\par Good choices include mints, hard candy, carrot or celery sticks, gum, and sunflower seeds.\par Drink lots of water: Flushing toxins from your body minimizes withdrawal symptoms and helps cravings pass faster.\par Look for new ways to relax and to cope with depression or anxiety: There are a lot of ways to improve your mood without smoking. \par \par Finding the resources and support to quit smoking\par There are many different methods that have successfully helped people to quit smoking, including:\par Quitting smoking cold turkey.\par Systematically decreasing the number of cigarettes you smoke.\par Reducing your intake of nicotine gradually over time.\par Using nicotine replacement therapy or non-nicotine medications to reduce withdrawal symptoms.\par Utilizing nicotine support groups.\par Trying hypnosis, acupuncture, or counseling using cognitive behavioral techniques.\par You may be successful with the first method you try. More likely, you’ll have to try a number of different methods or a combination of treatments to find the ones that work best for you.\par \par Medication therapy\par Smoking cessation medications can ease withdrawal symptoms and reduce cravings, and are most effective when used as part of a comprehensive stop smoking program monitored by your physician. Talk to your doctor about your options and whether an anti-smoking medication is right for you. U.S. Food and Drug Administration (FDA) approved options are: \par \par Nicotine Replacement Therapy\par Nicotine replacement therapy involves "replacing" cigarettes with other nicotine substitutes, such as nicotine gum or a nicotine patch. It works by delivering small and steady doses of nicotine into the body to relieve some of the withdrawal symptoms without the tars and poisonous gases found in cigarettes. This type of treatment helps smokers focus on breaking their psychological addiction and makes it easier to concentrate on learning new behaviors and coping skills.\par \par Non-Nicotine Medication\par These medications help you stop smoking by reducing cravings and withdrawal symptoms without the use of nicotine. Medications such as bupropion (Zyban) and varenicline (Chantix) are intended for short-term use only.\par \par Non-medication therapies\par There are several things you can do to stop smoking that don’t involve nicotine replacement therapy or prescription medications:\par Hypnosis\par A popular option that has produced good results. Forget anything you may have seen from stage hypnotists, hypnosis works by getting you into a deeply relaxed state where you are open to suggestions that strengthen your resolve to quit smoking and increase your negative feelings toward cigarettes. Ask your doctor to recommend a qualified smoking cessation hypnotherapist in your area or refer to the American Society of Clinical Hypnosis (ASCH) for guidelines on selecting a qualified professional.\par Acupuncture\par One of the oldest known medical techniques, acupuncture is believed to work by triggering the release of endorphins (natural pain relievers) that allow the body to relax. As a smoking cessation aid, acupuncture can be helpful in managing smoking withdrawal symptoms. Ask your doctor for a referral or search for a local practitioner at the American Association of Acupuncture and [place] [employer] (AAAOM).\par Behavioral Therapy\par Nicotine addiction is related to the habitual behaviors (the “rituals”) involved in smoking. Behavior therapy focuses on learning new coping skills and breaking those habits. The American Lung Association offers a free online smoking cessation program that focuses on behavioral change. To find a local behavioral therapist, check with your doctor or search at the Association for Behavioral and Cognitive Therapies (ABCT).\par Motivational Therapies\par Self-help books and websites can provide a number of ways to motivate yourself to quit smoking. One well known example is calculating the monetary savings. Some people have been able to find the motivation to quit just by calculating how much money they will save after they quit. It may be enough to pay for a summer vacation.\par \par \par \par [facility]\par [address]\[place] [address]\par [phone]\par [email or web address]\par \par \par WEIGHT GOALS:\par Category				BMI range - kg/m2	BMI Prime\par Very severely underweight		less than 15		less than 0.60	\par Severely underweight			from 15.0 to 16.0		from 0.60 to 0.64	\par Underweight				from 16.0 to 18.5		from 0.64 to 0.74	\par Normal (healthy weight)			from 18.5 to 25		from 0.74 to 1.0	\par Overweight				from 25 to 30		from 1.0 to 1.2	\par Obese Class I (Moderately obese)		from 30 to 35		from 1.2 to 1.4	\par Obese Class II (Severely obese)		from 35 to 40		from 1.4 to 1.6	\par Obese Class III (Very severely obese)	over 40	over 1.6				\par \par Your current weight is 100 lbs (105 lbs on 7/31/19; 85 lbs on 1/16/19; 81 lbs on 6/21/18; 85 lbs on 1/9/18). Given current weight and height 5'4", your calculated body mass index (BMI) is 17.2 kg/sqm. Normal BMI is 18.5-25 kg/sqm. Thus current weight is in the underweight category. Abdominal waist circumference is measured at the level of the umbilicus and is thus not a pants waist measurement. Your current abdominal waist circumference is 33 in. Normal abdominal waist circumference is < 32 inches for women and < 37 inches for men.

## 2020-02-05 NOTE — REASON FOR VISIT
[Follow-Up - Clinic] : a clinic follow-up of [Cardiomyopathy] : cardiomyopathy [Mitral Regurgitation] : mitral regurgitation [Hyperlipidemia] : hyperlipidemia [Coronary Artery Disease] : coronary artery disease [FreeTextEntry1] : isolated elevated BP

## 2020-02-05 NOTE — ADDENDUM
[FreeTextEntry1] : I spent 60 minutes face to face time with the patient, from 10:00 to 11:00 on 7/31/19. Thirty minutes were devoted to patient counseling with emphasis on maintenance smoking cessation (see Narrative section). Prior to this visit, I reviewed office records of Mariola Blanc, Gastroenterology and Neuro / Headache/Pain management service. Prior to this visit, I personally reviewed coronary angiogram with Dr. David Byrd and echocardiogram with Dr. Adam Romero.
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
## 2020-02-06 ENCOUNTER — APPOINTMENT (OUTPATIENT)
Dept: ORTHOPEDIC SURGERY | Facility: CLINIC | Age: 65
End: 2020-02-06

## 2020-02-14 ENCOUNTER — APPOINTMENT (OUTPATIENT)
Dept: PAIN MANAGEMENT | Facility: CLINIC | Age: 65
End: 2020-02-14

## 2020-03-16 NOTE — ED ADULT NURSE NOTE - PAIN RATING/NUMBER SCALE (0-10): REST
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 Will keep restrictions as stand until surgery is re-scheduled. If questions, call office at [phone] to discuss              SIGNATURE:___________________________________________                Ruperto Fitzgerald MD  [facility]  [address] [place] [id number]  Dept Phone: [phone]     8

## 2020-03-18 ENCOUNTER — APPOINTMENT (OUTPATIENT)
Dept: PAIN MANAGEMENT | Facility: CLINIC | Age: 65
End: 2020-03-18

## 2020-03-31 ENCOUNTER — APPOINTMENT (OUTPATIENT)
Dept: NEUROLOGY | Facility: CLINIC | Age: 65
End: 2020-03-31
Payer: MEDICARE

## 2020-03-31 ENCOUNTER — APPOINTMENT (OUTPATIENT)
Dept: NEUROLOGY | Facility: CLINIC | Age: 65
End: 2020-03-31

## 2020-03-31 ENCOUNTER — RX RENEWAL (OUTPATIENT)
Age: 65
End: 2020-03-31

## 2020-03-31 PROCEDURE — G2012 BRIEF CHECK IN BY MD/QHP: CPT

## 2020-04-01 ENCOUNTER — RX RENEWAL (OUTPATIENT)
Age: 65
End: 2020-04-01

## 2020-04-15 ENCOUNTER — APPOINTMENT (OUTPATIENT)
Dept: PAIN MANAGEMENT | Facility: CLINIC | Age: 65
End: 2020-04-15

## 2020-05-05 ENCOUNTER — APPOINTMENT (OUTPATIENT)
Dept: ORTHOPEDIC SURGERY | Facility: CLINIC | Age: 65
End: 2020-05-05
Payer: MEDICARE

## 2020-05-05 DIAGNOSIS — S92.012D DISPLACED FRACTURE OF BODY OF LEFT CALCANEUS, SUBSEQUENT ENCOUNTER FOR FRACTURE WITH ROUTINE HEALING: ICD-10-CM

## 2020-05-05 PROCEDURE — 99213 OFFICE O/P EST LOW 20 MIN: CPT

## 2020-05-06 PROBLEM — S92.012D CLOSED DISPLACED FRACTURE OF BODY OF LEFT CALCANEUS WITH ROUTINE HEALING, SUBSEQUENT ENCOUNTER: Status: ACTIVE | Noted: 2019-07-12

## 2020-05-06 NOTE — PHYSICAL EXAM
[de-identified] : Oriented to time, place, person\par Mood: Normal\par Affect: Normal\par Appearance: Healthy, well appearing, no acute distress.\par Gait: Antalgic\par \par Left foot exam\par \par Skin: In tact\par Inspection: Mild swelling of posterior foot, no effusion\par Pulses: Pink perfused\par ROM: 10 degrees of dorsiflexion, 30 degrees of plantarflexion\par Tenderness: Mild tender to the heel / achilles\par Stability: Stable \par Strength: Intact TA/GS/EHL\par Neuro: In tact to light touch throughout [de-identified] : 3 views of the left ankle/calcaneus were obtained 12.2.19 that show healed impacted displaced calcaneal fracture. Boeller angle ~30, gissane ~120.\par \par CT left foot dated 6.8.19 shows evidence of acute comminuted depressed calcaneal fracture.

## 2020-05-06 NOTE — DISCUSSION/SUMMARY
[de-identified] : 64-year-old female with left calcaneal fracture\par \par Healed fracture as visible on prior x-ray imaging.  Discussed again with the patient that she likely will continue to have hindfoot deformity and dysfunction through the subtalar joint given x-ray changes.  Persistent pain requiring narcotics as provided by an outside physician.  At this time, fracture has healed, and patient is reaching maximal medical improvement.\par \par Recommendations made for continued symptommatic care. Continue PT/HEP as able. Heel pad. Appropriate shoe wear. Activity to tolerance.\par \par Followup prn
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
## 2020-05-14 ENCOUNTER — APPOINTMENT (OUTPATIENT)
Dept: ORTHOPEDIC SURGERY | Facility: CLINIC | Age: 65
End: 2020-05-14

## 2020-05-29 ENCOUNTER — APPOINTMENT (OUTPATIENT)
Dept: PAIN MANAGEMENT | Facility: CLINIC | Age: 65
End: 2020-05-29
Payer: MEDICARE

## 2020-05-29 PROCEDURE — 99442: CPT

## 2020-05-31 NOTE — ASSESSMENT
[Opioids] : Patient was explained in detail about pain control by using opioids. Patient has signed and fully understands our guidelines for medication and drug screening.  Patient understands the side effects of opioids, including, but not limited to, drug tolerance, dependence, potential for addiction. This class of drugs is habit-forming and ELIJAH regulated. The sedative effects of opioids can be potentiated by taking alcohol or any sleeping pills, along with opioids. The decision to drive is patient’s responsibility, as opioids can affect his/her driving ability and ability to concentrate. The long-term place is not clear, however, patient understands that once the pain control optimizes, the goal will be to wean off the opioids. All the issues regarding opioid treatment have been addressed satisfactorily.  [FreeTextEntry1] : Chronic pain\par \par Will reduce daily opioid consumption by one pill per day. \par \par Time spent\par 15 min

## 2020-05-31 NOTE — HISTORY OF PRESENT ILLNESS
[FreeTextEntry1] : Telephone consented Patient at home and I was at office at 611\par Time spent 12 min\par \par She is requesting medication renewals.  She states that her pain has been consistent.  Her medications continue to provide both decrease pain and increase in functioning.  CYNDY=4-5/10 with her medications.  She reports both decrease pain and increase in her functioning.  \par \par She has a history of seizures and is followed here by her epileptologist as well.  She does not drive. She takes medical transportation to and from her appointments.  \par \par  She is disabled due to her seizure disorder but able to function at home.  She  is quite active around the home.   She gets out of her house regularly and is independent. \par \par No complaints of depression/suicidal ideations.  \par \par No other medical complaints.

## 2020-06-15 ENCOUNTER — RESULT REVIEW (OUTPATIENT)
Age: 65
End: 2020-06-15

## 2020-06-16 ENCOUNTER — APPOINTMENT (OUTPATIENT)
Dept: ULTRASOUND IMAGING | Facility: IMAGING CENTER | Age: 65
End: 2020-06-16
Payer: MEDICARE

## 2020-06-16 ENCOUNTER — APPOINTMENT (OUTPATIENT)
Dept: MAMMOGRAPHY | Facility: IMAGING CENTER | Age: 65
End: 2020-06-16
Payer: MEDICARE

## 2020-06-16 ENCOUNTER — OUTPATIENT (OUTPATIENT)
Dept: OUTPATIENT SERVICES | Facility: HOSPITAL | Age: 65
LOS: 1 days | End: 2020-06-16
Payer: COMMERCIAL

## 2020-06-16 ENCOUNTER — RESULT REVIEW (OUTPATIENT)
Age: 65
End: 2020-06-16

## 2020-06-16 DIAGNOSIS — Z00.8 ENCOUNTER FOR OTHER GENERAL EXAMINATION: ICD-10-CM

## 2020-06-16 PROCEDURE — 76641 ULTRASOUND BREAST COMPLETE: CPT | Mod: 26,50

## 2020-06-16 PROCEDURE — 77067 SCR MAMMO BI INCL CAD: CPT | Mod: 26,59

## 2020-06-16 PROCEDURE — 77066 DX MAMMO INCL CAD BI: CPT

## 2020-06-16 PROCEDURE — 76641 ULTRASOUND BREAST COMPLETE: CPT

## 2020-06-16 PROCEDURE — 77067 SCR MAMMO BI INCL CAD: CPT

## 2020-06-16 PROCEDURE — 77063 BREAST TOMOSYNTHESIS BI: CPT | Mod: 26,59

## 2020-06-16 PROCEDURE — 77063 BREAST TOMOSYNTHESIS BI: CPT

## 2020-06-16 PROCEDURE — 77066 DX MAMMO INCL CAD BI: CPT | Mod: 26,GG

## 2020-06-18 ENCOUNTER — APPOINTMENT (OUTPATIENT)
Dept: INTERNAL MEDICINE | Facility: CLINIC | Age: 65
End: 2020-06-18
Payer: MEDICARE

## 2020-06-18 DIAGNOSIS — Z78.9 OTHER SPECIFIED HEALTH STATUS: ICD-10-CM

## 2020-06-18 PROCEDURE — 99442: CPT

## 2020-06-18 NOTE — ASSESSMENT
[FreeTextEntry1] : 1) HL \par - well controlled\par - ct low fat diet\par - ct meds \par \par 2) Seizures\par - suboptimal control \par - ct meds \par - neuro eval \par \par 3) Breast cancer \par - mammo / US - bengn\par \par f/u in 3 month

## 2020-06-18 NOTE — HISTORY OF PRESENT ILLNESS
[Home] : at home, [unfilled] , at the time of the visit. [Verbal consent obtained from patient] : the patient, [unfilled] [Medical Office: ([facility])___] : at the medical office located in  [FreeTextEntry1] : f/u HL / seizure disorder / OP / breast cancer \par \par telephonic follow up \par \par seizures -stable\par taking meds \par \par HL -  \par On meds \par no AE \par \par OP \par no meds \par no falls

## 2020-06-25 ENCOUNTER — OUTPATIENT (OUTPATIENT)
Dept: OUTPATIENT SERVICES | Facility: HOSPITAL | Age: 65
LOS: 1 days | Discharge: HOME | End: 2020-06-25

## 2020-06-25 DIAGNOSIS — D64.9 ANEMIA, UNSPECIFIED: ICD-10-CM

## 2020-06-30 ENCOUNTER — APPOINTMENT (OUTPATIENT)
Dept: HEMATOLOGY ONCOLOGY | Facility: CLINIC | Age: 65
End: 2020-06-30
Payer: MEDICARE

## 2020-06-30 PROCEDURE — 99441: CPT

## 2020-06-30 NOTE — DISCUSSION/SUMMARY
[Cancer Type / Location / Histology Subtype: ________] : Cancer Type / Location / Histology Subtype: [unfilled] [I] : I [Diagnosis Date (year): ____] : Diagnosis Date (year): [unfilled] [Surgery] : Surgery: Yes [Surgery Date(s) (year): ____] : Surgery Date(s) (year): [unfilled] [Surgical Procedure / Location / Findings: _________] : Surgical Procedure / Location / Findings: [unfilled] [Body Area Treated: _________] : Body Area Treated: [unfilled] [Primary care physician] : primary care physician [Systemic Therapy (chemotherapy, hormonal therapy, other)] : Systemic Therapy (chemotherapy, hormonal therapy, other): Yes [Mammogram] : Mammogram [Skin Checks] : skin checks [Colonoscopy] : colonoscopy [PAP Test] : PAP test [Lung screening] : lung screening [Bone Density Test] : bone density test [Annual Flu Shot] : annual flu shot [Emotional and mental health] : Emotional and mental health [Cholesterol Test] : cholesterol test [Alcohol use] : Alcohol use [Tobacco use / cessation] : Tobacco use / cessation [Diet] : Diet [Sun screen use] : Sun screen use [Weigh Management (loss / gain)] : Weight management (loss / gain) [Physical activity] : Physical activity [Radiation] : Radiation: No [Need for onging (adjuvant) treatment for cancer?] : Need for onging (adjuvant) treatment for cancer? No [Follow up with Oncologist in _____] : Follow up with Oncologist in [unfilled] [Path to Wellness Survivorship Program] : Path to Wellness Survivorship Program [Follow up with Surgeon in _____] : Follow up with Surgeon in [unfilled] [Bridge to Survivorship Breast Cancer] : Bridge to Survivorship Breast Cancer [FreeTextEntry1] : [facility], 1998\par Tamoxifen [id number]\par Femara [id number] [FreeTextEntry8] : Patricia TERRY / Evette Adamson MD

## 2020-07-01 ENCOUNTER — APPOINTMENT (OUTPATIENT)
Dept: PAIN MANAGEMENT | Facility: CLINIC | Age: 65
End: 2020-07-01
Payer: MEDICARE

## 2020-07-01 VITALS
HEIGHT: 64 IN | BODY MASS INDEX: 17.07 KG/M2 | HEART RATE: 73 BPM | WEIGHT: 100 LBS | SYSTOLIC BLOOD PRESSURE: 131 MMHG | DIASTOLIC BLOOD PRESSURE: 83 MMHG

## 2020-07-01 VITALS — TEMPERATURE: 97.5 F

## 2020-07-01 PROCEDURE — 99213 OFFICE O/P EST LOW 20 MIN: CPT

## 2020-07-01 NOTE — ASSESSMENT
[FreeTextEntry1] : UDS today . \par ISTOP reviewed and appropriate.\par Pt has adjusted to 150 tablets / months \par RTO 1 month  [Opioids] : Patient was explained in detail about pain control by using opioids. Patient has signed and fully understands our guidelines for medication and drug screening.  Patient understands the side effects of opioids, including, but not limited to, drug tolerance, dependence, potential for addiction. This class of drugs is habit-forming and ELIJAH regulated. The sedative effects of opioids can be potentiated by taking alcohol or any sleeping pills, along with opioids. The decision to drive is patient’s responsibility, as opioids can affect his/her driving ability and ability to concentrate. The long-term place is not clear, however, patient understands that once the pain control optimizes, the goal will be to wean off the opioids. All the issues regarding opioid treatment have been addressed satisfactorily.

## 2020-07-01 NOTE — REVIEW OF SYSTEMS
[Chills] : no chills [Fever] : no fever [Chest Pain] : no chest pain [Palpitations] : no palpitations [Constipation] : no constipation

## 2020-07-01 NOTE — HISTORY OF PRESENT ILLNESS
[FreeTextEntry1] : Telephone consented Patient at home and I was at office at 611\par Time spent 12 min\par \par She is requesting medication renewals.  She states that her pain has been consistent.  Her medications continue to provide both decrease pain and increase in functioning.  CYNDY=4-5/10 with her medications.  She reports both decrease pain and increase in her functioning.  \par \par She has a history of seizures and is followed here by her epileptologist as well.  She does not drive. She takes medical transportation to and from her appointments.  \par \par  She is disabled due to her seizure disorder but able to function at home.  She  is quite active around the home.   She has been in quarantine for the past 3 months. \par \par No complaints of depression/suicidal ideations.  \par \par No other medical complaints.

## 2020-07-01 NOTE — PHYSICAL EXAM
[General Appearance - Alert] : alert [General Appearance - Well-Appearing] : healthy appearing [Affect] : the affect was normal [] : normal voice and communication [Oriented To Time, Place, And Person] : oriented to person, place, and time [Mood] : the mood was normal [FreeTextEntry8] : uses a cane [Sclera] : the sclera and conjunctiva were normal [Motor Strength Lower Extremities Bilaterally] : strength was normal in both lower extremities

## 2020-07-07 ENCOUNTER — APPOINTMENT (OUTPATIENT)
Dept: NEUROLOGY | Facility: CLINIC | Age: 65
End: 2020-07-07
Payer: MEDICARE

## 2020-07-07 PROCEDURE — 99214 OFFICE O/P EST MOD 30 MIN: CPT | Mod: 95

## 2020-07-07 NOTE — PHYSICAL EXAM
[General Appearance - Alert] : alert [Person] : oriented to person [General Appearance - In No Acute Distress] : in no acute distress [Place] : oriented to place [Short Term Intact] : short term memory intact [Time] : oriented to time [Registration Intact] : recent registration memory intact [Remote Intact] : remote memory intact [Concentration Intact] : normal concentrating ability [Span Intact] : the attention span was normal [Visual Intact] : visual attention was ~T not ~L decreased [Naming Objects] : no difficulty naming common objects [Writing A Sentence] : no difficulty writing a sentence [Repeating Phrases] : no difficulty repeating a phrase [Reading] : reading intact [Fluency] : fluency intact [Comprehension] : comprehension intact [Current Events] : adequate knowledge of current events [Past History] : adequate knowledge of personal past history [Cranial Nerves Optic (II)] : visual acuity intact bilaterally,  visual fields full to confrontation, pupils equal round and reactive to light [Vocabulary] : adequate range of vocabulary [Cranial Nerves Trigeminal (V)] : facial sensation intact symmetrically [Cranial Nerves Oculomotor (III)] : extraocular motion intact [Cranial Nerves Vestibulocochlear (VIII)] : hearing was intact bilaterally [Cranial Nerves Glossopharyngeal (IX)] : tongue and palate midline [Cranial Nerves Facial (VII)] : face symmetrical [Cranial Nerves Accessory (XI - Cranial And Spinal)] : head turning and shoulder shrug symmetric [Cranial Nerves Hypoglossal (XII)] : there was no tongue deviation with protrusion [Motor Strength] : muscle strength was normal in all four extremities [Sensation Tactile Decrease] : light touch was intact [No Muscle Atrophy] : normal bulk in all four extremities [Sensation Vibration Decrease] : vibration was intact [Sensation Pain / Temperature Decrease] : pain and temperature was intact [Proprioception] : proprioception was intact [Balance] : balance was intact [Past-pointing] : there was no past-pointing [Tremor] : no tremor present [Plantar Reflex Right Only] : normal on the right [2+] : Ankle jerk left 2+ [Plantar Reflex Left Only] : normal on the left [Abnormal Walk] : normal gait [Nail Clubbing] : no clubbing  or cyanosis of the fingernails [Motor Tone] : muscle strength and tone were normal [FreeTextEntry1] : Increased pain left arm with limited ROM [Musculoskeletal - Swelling] : no joint swelling seen [Skin Turgor] : normal skin turgor [Skin Color & Pigmentation] : normal skin color and pigmentation [] : no rash

## 2020-07-07 NOTE — REVIEW OF SYSTEMS
[Feeling Poorly] : feeling poorly [Feeling Tired] : feeling tired [Confused or Disoriented] : confusion [Decr. Concentrating Ability] : decreased concentrating ability [Difficulty with Language] : no ~M difficulty with language [Changed Thought Patterns] : no change in thought patterns [Seizures] : convulsions [Dizziness] : no dizziness [Fainting] : no fainting [Lightheadedness] : no lightheadedness [Vertigo] : no vertigo [Negative] : Endocrine
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
## 2020-07-07 NOTE — DISCUSSION/SUMMARY
[Medically Refractory (seizure within the last year)] : Medically Refractory (seizure within the last year) [FreeTextEntry1] : 64 year old woman with complex partial refractory epilepsy on Klonopin and CBZ s/p surgery over 20 years ago with chronic headache as well.\par \par -The VEEG showed left sided seizures from the Fronto-temporal region.  We discussed the possibility of epilepsy sx in detail.  She had neuropsych testing which showed diffuse cerebral dysfunction, right greater than left.\par \par Plan:\par - Taking Clobazam 40mg bid and doing better\par - Discussed risks of CBZ in osteoporosis, but risk of stopping medication high in increasing her seizures.  She knows risk of fracture at this time and is being treated by her PMD. \par - encouraged 3 servings Ca+ in diet and low weight bearing exercises\par - reviewed seizure triggers\par - checked in Jan CMP, CBC, CBZ (CMP and CBC going to be checked by PMD)  - will check again after COVID due to risk of going to lab at this point or next visit.\par -follow up in 2-3 months.\par \par Patient seen 25 min face to face with >50% in counseling and discussion.\par \par . [Complex Partial] : complex partial [Symptomatic] : symptomatic [Risks Associated with Driving/[place] Law] : As per my usual protocol, the patient was advised in regards to risks and driving privileges associated with the [place] Guidelines.  [Bone Health Education] : Patient was educated in regards to bone health and an increased risk of osteoporosis in patients with epilepsy. [Safety Recommendations] : The patient was advised in regards to the risk of seizures and general seizure safety recommendations including not to be bathing alone, climbing to high places and operating heavy machinery. [Compliance with Medications] : The importance of compliance with medications was reinforced. [Sleep Hygiene/Sleep Disruption Risks] : Sleep hygiene and the risks of sleep disruption were discussed. [Surgical Options/Risks/Benefits] : Surgical options/risks/benefits for intractable epilepsy were discussed. [Risk of Death] : Risk of death associated with seizures / SUDEP was discussed. [Opioids] : Patient was explained in detail about pain control by using opioids. Patient has signed and fully understands our guidelines for medication and drug screening.  Patient understands the side effects of opioids, including, but not limited to, drug tolerance, dependence, potential for addiction. This class of drugs is habit-forming and ELIJAH regulated. The sedative effects of opioids can be potentiated by taking alcohol or any sleeping pills, along with opioids. The decision to drive is patient’s responsibility, as opioids can affect his/her driving ability and ability to concentrate. The long-term place is not clear, however, patient understands that once the pain control optimizes, the goal will be to wean off the opioids. All the issues regarding opioid treatment have been addressed satisfactorily.

## 2020-08-11 ENCOUNTER — LABORATORY RESULT (OUTPATIENT)
Age: 65
End: 2020-08-11

## 2020-08-12 ENCOUNTER — APPOINTMENT (OUTPATIENT)
Dept: CARDIOLOGY | Facility: CLINIC | Age: 65
End: 2020-08-12
Payer: MEDICARE

## 2020-08-12 ENCOUNTER — NON-APPOINTMENT (OUTPATIENT)
Age: 65
End: 2020-08-12

## 2020-08-12 VITALS
HEIGHT: 64 IN | SYSTOLIC BLOOD PRESSURE: 128 MMHG | TEMPERATURE: 97.4 F | OXYGEN SATURATION: 100 % | HEART RATE: 63 BPM | WEIGHT: 98 LBS | BODY MASS INDEX: 16.73 KG/M2 | DIASTOLIC BLOOD PRESSURE: 65 MMHG | RESPIRATION RATE: 12 BRPM

## 2020-08-12 VITALS — SYSTOLIC BLOOD PRESSURE: 129 MMHG | DIASTOLIC BLOOD PRESSURE: 73 MMHG | OXYGEN SATURATION: 99 % | HEART RATE: 67 BPM

## 2020-08-12 VITALS — SYSTOLIC BLOOD PRESSURE: 123 MMHG | OXYGEN SATURATION: 99 % | DIASTOLIC BLOOD PRESSURE: 67 MMHG | HEART RATE: 66 BPM

## 2020-08-12 VITALS — HEART RATE: 72 BPM | SYSTOLIC BLOOD PRESSURE: 127 MMHG | DIASTOLIC BLOOD PRESSURE: 72 MMHG | OXYGEN SATURATION: 98 %

## 2020-08-12 VITALS — HEART RATE: 66 BPM | SYSTOLIC BLOOD PRESSURE: 136 MMHG | OXYGEN SATURATION: 98 % | DIASTOLIC BLOOD PRESSURE: 70 MMHG

## 2020-08-12 VITALS — HEART RATE: 66 BPM | SYSTOLIC BLOOD PRESSURE: 132 MMHG | OXYGEN SATURATION: 100 % | DIASTOLIC BLOOD PRESSURE: 65 MMHG

## 2020-08-12 DIAGNOSIS — E55.9 VITAMIN D DEFICIENCY, UNSPECIFIED: ICD-10-CM

## 2020-08-12 PROCEDURE — 36415 COLL VENOUS BLD VENIPUNCTURE: CPT

## 2020-08-12 PROCEDURE — 93000 ELECTROCARDIOGRAM COMPLETE: CPT

## 2020-08-12 PROCEDURE — 99215 OFFICE O/P EST HI 40 MIN: CPT

## 2020-08-12 PROCEDURE — 93040 RHYTHM ECG WITH REPORT: CPT | Mod: 59

## 2020-08-13 LAB
25(OH)D3 SERPL-MCNC: 35.8 NG/ML
ALBUMIN SERPL ELPH-MCNC: 4.4 G/DL
ALP BLD-CCNC: 99 U/L
ALT SERPL-CCNC: 10 U/L
ANION GAP SERPL CALC-SCNC: 17 MMOL/L
APPEARANCE: ABNORMAL
AST SERPL-CCNC: 17 U/L
BACTERIA: ABNORMAL
BASOPHILS # BLD AUTO: 0.07 K/UL
BASOPHILS NFR BLD AUTO: 0.9 %
BILIRUB SERPL-MCNC: 0.3 MG/DL
BILIRUBIN URINE: NEGATIVE
BLOOD URINE: NEGATIVE
BUN SERPL-MCNC: 10 MG/DL
CALCIUM SERPL-MCNC: 9.4 MG/DL
CHLORIDE SERPL-SCNC: 102 MMOL/L
CHOLEST SERPL-MCNC: 150 MG/DL
CHOLEST/HDLC SERPL: 2.7 RATIO
CO2 SERPL-SCNC: 23 MMOL/L
COLOR: YELLOW
CREAT SERPL-MCNC: 0.61 MG/DL
CREAT SPEC-SCNC: 126 MG/DL
CRP SERPL HS-MCNC: 6.39 MG/L
EOSINOPHIL # BLD AUTO: 0.24 K/UL
EOSINOPHIL NFR BLD AUTO: 3.1 %
ESTIMATED AVERAGE GLUCOSE: 100 MG/DL
GLUCOSE QUALITATIVE U: NEGATIVE
GLUCOSE SERPL-MCNC: 58 MG/DL
HBA1C MFR BLD HPLC: 5.1 %
HCT VFR BLD CALC: 38.4 %
HDLC SERPL-MCNC: 55 MG/DL
HGB BLD-MCNC: 12 G/DL
HYALINE CASTS: 0 /LPF
IMM GRANULOCYTES NFR BLD AUTO: 0.4 %
KETONES URINE: NEGATIVE
LDLC SERPL CALC-MCNC: 79 MG/DL
LDLC SERPL DIRECT ASSAY-MCNC: 79 MG/DL
LEUKOCYTE ESTERASE URINE: NEGATIVE
LYMPHOCYTES # BLD AUTO: 2.3 K/UL
LYMPHOCYTES NFR BLD AUTO: 29.7 %
MAN DIFF?: NORMAL
MCHC RBC-ENTMCNC: 31.3 GM/DL
MCHC RBC-ENTMCNC: 33 PG
MCV RBC AUTO: 105.5 FL
MICROALBUMIN 24H UR DL<=1MG/L-MCNC: 1.7 MG/DL
MICROALBUMIN/CREAT 24H UR-RTO: 14 MG/G
MICROSCOPIC-UA: NORMAL
MONOCYTES # BLD AUTO: 0.49 K/UL
MONOCYTES NFR BLD AUTO: 6.3 %
NEUTROPHILS # BLD AUTO: 4.61 K/UL
NEUTROPHILS NFR BLD AUTO: 59.6 %
NITRITE URINE: NEGATIVE
PH URINE: 6
PLATELET # BLD AUTO: 211 K/UL
POTASSIUM SERPL-SCNC: 4.2 MMOL/L
PROT SERPL-MCNC: 7.5 G/DL
PROTEIN URINE: NORMAL
RBC # BLD: 3.64 M/UL
RBC # FLD: 13.8 %
RED BLOOD CELLS URINE: 2 /HPF
SODIUM SERPL-SCNC: 142 MMOL/L
SPECIFIC GRAVITY URINE: 1.03
SQUAMOUS EPITHELIAL CELLS: 12 /HPF
T4 FREE SERPL-MCNC: 0.7 NG/DL
TRIGL SERPL-MCNC: 81 MG/DL
TSH SERPL-ACNC: 1.41 UIU/ML
URATE SERPL-MCNC: 2.8 MG/DL
URINE COMMENTS: NORMAL
UROBILINOGEN URINE: ABNORMAL
WBC # FLD AUTO: 7.74 K/UL
WHITE BLOOD CELLS URINE: 5 /HPF

## 2020-08-13 NOTE — ADDENDUM
[FreeTextEntry1] : I spent 60 minutes face to face time with the patient, from 10:00 to 11:00 on 8/12/2020. Thirty minutes were devoted to patient counseling with emphasis on maintenance smoking cessation (see Narrative section). Prior to this visit, I reviewed office records of Mariola Blanc, Gastroenterology and Neuro / Headache/Pain management service.

## 2020-08-13 NOTE — REASON FOR VISIT
[Cardiomyopathy] : cardiomyopathy [Follow-Up - Clinic] : a clinic follow-up of [Coronary Artery Disease] : coronary artery disease [Hyperlipidemia] : hyperlipidemia [Mitral Regurgitation] : mitral regurgitation [FreeTextEntry1] : isolated elevated BP

## 2020-08-13 NOTE — DISCUSSION/SUMMARY
[FreeTextEntry1] : \par SMOKING CESSATION:\par We all know the health risks of smoking, and most of us know that kicking the habit is the single biggest improvement to health a smoker can make. But that doesn’t make it any easier to kick the habit. Whether you’re a teen smoker or a lifetime pack-a-day smoker, quitting can be tough.\par To increase your chances of success, you need to be motivated, have social support, an understanding of what to expect, and a personal game plan. It is possible to learn how to replace your smoking habits, manage your cravings, and join the millions of people who have kicked the habit for good.\par Smoking tobacco is both a psychological habit and a physical addiction. The act of smoking is ingrained as a daily ritual and, at the same time, the nicotine from cigarettes provides a temporary, and addictive, high. Eliminating that regular fix of nicotine will cause your body to experience physical withdrawal symptoms and cravings. To successfully quit smoking, you’ll need to address both the habit and the addiction by changing your behavior and dealing with nicotine withdrawal symptoms.\par Relieving unpleasant and overwhelming feelings without cigarettes\par Managing unpleasant feelings such as stress, depression, loneliness, fear, and anxiety are some of the most common reasons why adults smoke. When you have a bad day, it can seem like your cigarettes are your only friend. Smoking can temporarily make feelings such as sadness, stress, anxiety, depression, and boredom evaporate into thin air. As much comfort as cigarettes provide, though, it’s important to remember that there are healthier (and more effective) ways to keep unpleasant feelings in check. These may include exercising, meditating, using sensory relaxation strategies, and practicing simple breathing exercises. \par For many people, an important aspect of quitting smoking is to find alternate ways to handle these difficult feelings without smoking. Even when cigarettes are no longer a part of your life, the painful and unpleasant feelings that may have prompted you to smoke in the past will still remain. So, it’s worth spending some time thinking about the different ways you intend to deal with stressful situations and the daily irritations that would normally have you reaching for a cigarette.\par Tailoring a personal game plan to your specific needs and desires can be a big help. List the reasons why you want to quit and then keep copies of the list in the places where you’d normally keep your cigarettes, such as in your jacket, purse, or car. Your reasons for quitting smoking might include:\par I will feel healthier and have more energy, whiter teeth and fresher breath.\par I will lower my risk for cancer, heart attacks, strokes, early death, cataracts, and skin wrinkling.\par I will make myself and my partner, friends, and family proud of me.\par I will no longer expose my children and others to the dangers of my second-hand smoke.\par I will have a healthier baby (If you or your partner is pregnant).\par I will have more money to spend.\par I won't have to worry: "When will I get to smoke next?"\par Questions to ask yourself\par To successfully detach from smoking, you will need to identify and address your smoking habits, the true nature of your dependency, and the techniques that work for you. These types of questions can help:\par Do you feel the need to smoke at every meal?\par Are you more of a social smoker?\par Is it a very bad addiction (more than a pack a day)? Or would a simple nicotine patch do the job?\par Is your cigarette smoking linked to other addictions, such as alcohol or gambling?\par Are you open to hypnotherapy and/or acupuncture?\par Are you someone who is open to talking about your addiction with a therapist or counselor?\par Are you interested in getting into a fitness program?\par Take the time to think of what kind of smoker you are, which moments of your life call for a cigarette, and why. This will help you to identify which tips, techniques or therapies may be most beneficial for you. \par Start your stop smoking plan with START\par S = Set a quit date.\par T = Tell family, friends, and co-workers that you plan to quit.\par A = Anticipate and plan for the challenges you'll face while quitting.\par R = Remove cigarettes and other tobacco products from your home, car, and work.\par T = Talk to your doctor about getting help to quit.\par \par After quitting, you may feel dizzy, restless, or even have strong headaches because you’re lacking the immediate release of sugar that comes from nicotine. You may also have a bigger appetite. These sugar-related cravings should only last a few days until your body adjusts so keep your sugar levels a bit higher than usual on those days by drinking plenty of juice (unless you’re a diabetic). It will help prevent the craving symptoms and help your body re-adjust back to normal.\par Tips for managing other cigarette cravings\par Cravings associated with meals\par For some smokers, ending a meal means lighting up, and the prospect of giving that up may appear daunting. TIP: replace that moment after a meal with something such as a piece of fruit, a (healthy) dessert, a square of chocolate, or a stick of gum.\par \par Alcohol and cigarettes\par Many people have a habit of smoking when they have an alcoholic drink. TIP: try non-alcoholic drinks, or try drinking only in bars, restaurant-bars, or friends’ houses where smoking inside is prohibited. Or try snacking on nuts and chips, or chewing on a straw or cocktail stick.\par \par Cravings associated with social smoking\par When friends, family, and co-workers smoke around you, it is doubly difficult to quit or avoid relapse. TIP: Your social circles need to know that you are changing your habits so talk about your decision to quit. Let them know they won’t be able to smoke when you’re in the car with them or taking a coffee break together. \par \par In your workplace, don’t take all your coffee breaks with smokers only, do something else instead, or find non-smokers to have your breaks with.\par Additional tips to deal with cravings and withdrawal symptoms\par Stay active: Keep yourself distracted and occupied, go for walks.\par Keep your hands/fingers busy: Squeeze balls, pencils, or paper clips are good substitutes to satisfy that need for tactile stimulation.\par Keep your mind busy: Read a book or magazine, listen to some music you love.\par Find an oral substitute: Keep other things around to pop in your mouth when you’re craving a cigarette.\par Good choices include mints, hard candy, carrot or celery sticks, gum, and sunflower seeds.\par Drink lots of water: Flushing toxins from your body minimizes withdrawal symptoms and helps cravings pass faster.\par Look for new ways to relax and to cope with depression or anxiety: There are a lot of ways to improve your mood without smoking. \par \par Finding the resources and support to quit smoking\par There are many different methods that have successfully helped people to quit smoking, including:\par Quitting smoking cold turkey.\par Systematically decreasing the number of cigarettes you smoke.\par Reducing your intake of nicotine gradually over time.\par Using nicotine replacement therapy or non-nicotine medications to reduce withdrawal symptoms.\par Utilizing nicotine support groups.\par Trying hypnosis, acupuncture, or counseling using cognitive behavioral techniques.\par You may be successful with the first method you try. More likely, you’ll have to try a number of different methods or a combination of treatments to find the ones that work best for you.\par \par Medication therapy\par Smoking cessation medications can ease withdrawal symptoms and reduce cravings, and are most effective when used as part of a comprehensive stop smoking program monitored by your physician. Talk to your doctor about your options and whether an anti-smoking medication is right for you. U.S[place] Food and Drug Administration (FDA) approved options are: \par \par Nicotine Replacement Therapy\par Nicotine replacement therapy involves "replacing" cigarettes with other nicotine substitutes, such as nicotine gum or a nicotine patch. It works by delivering small and steady doses of nicotine into the body to relieve some of the withdrawal symptoms without the tars and poisonous gases found in cigarettes. This type of treatment helps smokers focus on breaking their psychological addiction and makes it easier to concentrate on learning new behaviors and coping skills.\par \par Non-Nicotine Medication\par These medications help you stop smoking by reducing cravings and withdrawal symptoms without the use of nicotine. Medications such as bupropion (Zyban) and varenicline (Chantix) are intended for short-term use only.\par \par Non-medication therapies\par There are several things you can do to stop smoking that don’t involve nicotine replacement therapy or prescription medications:\par Hypnosis\par A popular option that has produced good results. Forget anything you may have seen from stage hypnotists, hypnosis works by getting you into a deeply relaxed state where you are open to suggestions that strengthen your resolve to quit smoking and increase your negative feelings toward cigarettes. Ask your doctor to recommend a qualified smoking cessation hypnotherapist in your area or refer to the American Society of Clinical Hypnosis (ASCH) for guidelines on selecting a qualified professional.\par Acupuncture\par One of the oldest known medical techniques, acupuncture is believed to work by triggering the release of endorphins (natural pain relievers) that allow the body to relax. As a smoking cessation aid, acupuncture can be helpful in managing smoking withdrawal symptoms. Ask your doctor for a referral or search for a local practitioner at the American Association of Acupuncture and [place] [employer] (AAAOM).\par Behavioral Therapy\par Nicotine addiction is related to the habitual behaviors (the “rituals”) involved in smoking. Behavior therapy focuses on learning new coping skills and breaking those habits. The American Lung Association offers a free online smoking cessation program that focuses on behavioral change. To find a local behavioral therapist, check with your doctor or search at the Association for Behavioral and Cognitive Therapies (ABCT).\par Motivational Therapies\par Self-help books and websites can provide a number of ways to motivate yourself to quit smoking. One well known example is calculating the monetary savings. Some people have been able to find the motivation to quit just by calculating how much money they will save after they quit. It may be enough to pay for a summer vacation.\par \par [facility]\par [address]\[place] [address]\par [phone]\par [email or web address]\par \par \par WEIGHT GOALS:\par Category				BMI range - kg/m2	BMI Prime\par Very severely underweight		less than 15		less than 0.60	\par Severely underweight			from 15.0 to 16.0		from 0.60 to 0.64	\par Underweight				from 16.0 to 18.5		from 0.64 to 0.74	\par Normal (healthy weight)			from 18.5 to 25		from 0.74 to 1.0	\par Overweight				from 25 to 30		from 1.0 to 1.2	\par Obese Class I (Moderately obese)		from 30 to 35		from 1.2 to 1.4	\par Obese Class II (Severely obese)		from 35 to 40		from 1.4 to 1.6	\par Obese Class III (Very severely obese)	over 40	over 1.6				\par \par Your current weight is 98 lbs (100 lbs on 2/5/2020; 105 lbs on 7/31/19; 85 lbs on 1/16/19; 81 lbs on 6/21/18; 85 lbs on 1/9/18). Given current weight and height 5'4", your calculated body mass index (BMI) is 16.8 kg/sqm. Normal BMI is 18.5-25 kg/sqm. Thus current weight is in the underweight category. Abdominal waist circumference is measured at the level of the umbilicus and is thus not a pants waist measurement. Your current abdominal waist circumference is 32 in. Normal abdominal waist circumference is < 32 inches for women and < 37 inches for men.
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
## 2020-08-14 LAB — APO LP(A) SERPL-MCNC: 15.8 NMOL/L

## 2020-08-19 ENCOUNTER — APPOINTMENT (OUTPATIENT)
Dept: PAIN MANAGEMENT | Facility: CLINIC | Age: 65
End: 2020-08-19
Payer: MEDICARE

## 2020-08-19 VITALS
BODY MASS INDEX: 16.73 KG/M2 | SYSTOLIC BLOOD PRESSURE: 150 MMHG | HEART RATE: 69 BPM | HEIGHT: 64 IN | DIASTOLIC BLOOD PRESSURE: 82 MMHG | WEIGHT: 98 LBS

## 2020-08-19 VITALS — TEMPERATURE: 97.3 F

## 2020-08-19 PROCEDURE — 99213 OFFICE O/P EST LOW 20 MIN: CPT

## 2020-08-19 NOTE — REVIEW OF SYSTEMS
[Fever] : no fever [Chest Pain] : no chest pain [Chills] : no chills [Palpitations] : no palpitations [Constipation] : no constipation

## 2020-08-19 NOTE — PHYSICAL EXAM
[General Appearance - Well-Appearing] : healthy appearing [General Appearance - Alert] : alert [Oriented To Time, Place, And Person] : oriented to person, place, and time [Mood] : the mood was normal [Affect] : the affect was normal [FreeTextEntry8] : uses a cane [Motor Strength Lower Extremities Bilaterally] : strength was normal in both lower extremities [] : no respiratory distress [Sclera] : the sclera and conjunctiva were normal [Edema] : there was no peripheral edema [Respiration, Rhythm And Depth] : normal respiratory rhythm and effort

## 2020-08-19 NOTE — ASSESSMENT
[Opioids] : Patient was explained in detail about pain control by using opioids. Patient has signed and fully understands our guidelines for medication and drug screening.  Patient understands the side effects of opioids, including, but not limited to, drug tolerance, dependence, potential for addiction. This class of drugs is habit-forming and ELIJAH regulated. The sedative effects of opioids can be potentiated by taking alcohol or any sleeping pills, along with opioids. The decision to drive is patient’s responsibility, as opioids can affect his/her driving ability and ability to concentrate. The long-term place is not clear, however, patient understands that once the pain control optimizes, the goal will be to wean off the opioids. All the issues regarding opioid treatment have been addressed satisfactorily.  [FreeTextEntry1] : ISTOP reviewed \par NO changes today. \par RTO 1 month

## 2020-09-23 ENCOUNTER — APPOINTMENT (OUTPATIENT)
Dept: PAIN MANAGEMENT | Facility: CLINIC | Age: 65
End: 2020-09-23
Payer: MEDICARE

## 2020-09-23 VITALS
HEART RATE: 89 BPM | WEIGHT: 98 LBS | DIASTOLIC BLOOD PRESSURE: 86 MMHG | SYSTOLIC BLOOD PRESSURE: 129 MMHG | BODY MASS INDEX: 16.73 KG/M2 | HEIGHT: 64 IN

## 2020-09-23 VITALS — TEMPERATURE: 97.5 F

## 2020-09-23 PROCEDURE — 99213 OFFICE O/P EST LOW 20 MIN: CPT

## 2020-09-23 NOTE — ASSESSMENT
[FreeTextEntry1] : No changes today . \par UDS reviewed .  \par RTO 1 month  [Opioids] : Patient was explained in detail about pain control by using opioids. Patient has signed and fully understands our guidelines for medication and drug screening.  Patient understands the side effects of opioids, including, but not limited to, drug tolerance, dependence, potential for addiction. This class of drugs is habit-forming and ELIJAH regulated. The sedative effects of opioids can be potentiated by taking alcohol or any sleeping pills, along with opioids. The decision to drive is patient’s responsibility, as opioids can affect his/her driving ability and ability to concentrate. The long-term place is not clear, however, patient understands that once the pain control optimizes, the goal will be to wean off the opioids. All the issues regarding opioid treatment have been addressed satisfactorily.

## 2020-09-23 NOTE — REVIEW OF SYSTEMS
[Fever] : no fever [Chills] : no chills [Chest Pain] : no chest pain [Palpitations] : no palpitations [Shortness Of Breath] : no shortness of breath [Anxiety] : no anxiety [Depression] : no depression

## 2020-09-23 NOTE — HISTORY OF PRESENT ILLNESS
[FreeTextEntry1] : Pt returns today for a followup visit . \par Continues to have chronic pain and headaches. \par Pt explains she had a seizure last week .\par She has a follow up appt with Dr Hernandez next week .\par SHe is  taking Vicodin as prescribed. \par No signs of toxicity noted .\par Pt does not drink alcohol Pt does not drive .\par \par ISTOP # [id number]\par

## 2020-09-23 NOTE — PHYSICAL EXAM
[General Appearance - Alert] : alert [General Appearance - Well-Appearing] : healthy appearing [] : normal voice and communication [Oriented To Time, Place, And Person] : oriented to person, place, and time [Affect] : the affect was normal [Mood] : the mood was normal [Paresis Pronator Drift Right-Sided] : no pronator drift on the right [Paresis Pronator Drift Left-Sided] : no pronator drift on the left [Motor Strength Upper Extremities Bilaterally] : strength was normal in both upper extremities [Motor Strength Lower Extremities Bilaterally] : strength was normal in both lower extremities [Sclera] : the sclera and conjunctiva were normal [Edema] : there was no peripheral edema

## 2020-10-21 ENCOUNTER — APPOINTMENT (OUTPATIENT)
Dept: PAIN MANAGEMENT | Facility: CLINIC | Age: 65
End: 2020-10-21
Payer: MEDICARE

## 2020-10-21 VITALS
HEART RATE: 83 BPM | DIASTOLIC BLOOD PRESSURE: 85 MMHG | HEIGHT: 64 IN | WEIGHT: 98 LBS | BODY MASS INDEX: 16.73 KG/M2 | SYSTOLIC BLOOD PRESSURE: 158 MMHG

## 2020-10-21 PROCEDURE — 99213 OFFICE O/P EST LOW 20 MIN: CPT

## 2020-10-21 NOTE — HISTORY OF PRESENT ILLNESS
[FreeTextEntry1] : Pt returns today for a followup visit. \par Continues to have lower back pain and headaches . \par Taking Vicodin as prescribed , no signs of toxicity noted . ISTOP # [id number]\par Mood has been good . Pt does not drink alcohol .\par \par Pt will have followup with Dr Hernandez next week\par

## 2020-10-21 NOTE — PHYSICAL EXAM
[General Appearance - Alert] : alert [General Appearance - Well-Appearing] : healthy appearing [Oriented To Time, Place, And Person] : oriented to person, place, and time [Affect] : the affect was normal [Mood] : the mood was normal [Motor Strength] : muscle strength was normal in all four extremities [Paresis Pronator Drift Right-Sided] : no pronator drift on the right [Paresis Pronator Drift Left-Sided] : no pronator drift on the left [] : no respiratory distress

## 2020-10-21 NOTE — ASSESSMENT
[FreeTextEntry1] : No changes today. \par I will continue to monitor ISTOP and for toxicity. \par RTO 1 month \par Dr Murphy on site , billed incident to service.  [Opioids] : Patient was explained in detail about pain control by using opioids. Patient has signed and fully understands our guidelines for medication and drug screening.  Patient understands the side effects of opioids, including, but not limited to, drug tolerance, dependence, potential for addiction. This class of drugs is habit-forming and ELIJAH regulated. The sedative effects of opioids can be potentiated by taking alcohol or any sleeping pills, along with opioids. The decision to drive is patient’s responsibility, as opioids can affect his/her driving ability and ability to concentrate. The long-term place is not clear, however, patient understands that once the pain control optimizes, the goal will be to wean off the opioids. All the issues regarding opioid treatment have been addressed satisfactorily.

## 2020-10-26 ENCOUNTER — APPOINTMENT (OUTPATIENT)
Dept: NEUROLOGY | Facility: CLINIC | Age: 65
End: 2020-10-26
Payer: MEDICARE

## 2020-10-26 VITALS
DIASTOLIC BLOOD PRESSURE: 82 MMHG | SYSTOLIC BLOOD PRESSURE: 145 MMHG | HEIGHT: 64 IN | HEART RATE: 79 BPM | BODY MASS INDEX: 16.73 KG/M2 | WEIGHT: 98 LBS

## 2020-10-26 VITALS — TEMPERATURE: 96.2 F

## 2020-10-26 PROCEDURE — 99072 ADDL SUPL MATRL&STAF TM PHE: CPT

## 2020-10-26 PROCEDURE — 99214 OFFICE O/P EST MOD 30 MIN: CPT

## 2020-10-26 NOTE — PHYSICAL EXAM
[General Appearance - Alert] : alert [General Appearance - In No Acute Distress] : in no acute distress [Person] : oriented to person [Place] : oriented to place [Time] : oriented to time [Short Term Intact] : short term memory intact [Remote Intact] : remote memory intact [Registration Intact] : recent registration memory intact [Span Intact] : the attention span was normal [Concentration Intact] : normal concentrating ability [Visual Intact] : visual attention was ~T not ~L decreased [Naming Objects] : no difficulty naming common objects [Repeating Phrases] : no difficulty repeating a phrase [Writing A Sentence] : no difficulty writing a sentence [Fluency] : fluency intact [Comprehension] : comprehension intact [Reading] : reading intact [Current Events] : adequate knowledge of current events [Past History] : adequate knowledge of personal past history [Vocabulary] : adequate range of vocabulary [Cranial Nerves Optic (II)] : visual acuity intact bilaterally,  visual fields full to confrontation, pupils equal round and reactive to light [Cranial Nerves Oculomotor (III)] : extraocular motion intact [Cranial Nerves Trigeminal (V)] : facial sensation intact symmetrically [Cranial Nerves Facial (VII)] : face symmetrical [Cranial Nerves Vestibulocochlear (VIII)] : hearing was intact bilaterally [Cranial Nerves Glossopharyngeal (IX)] : tongue and palate midline [Cranial Nerves Accessory (XI - Cranial And Spinal)] : head turning and shoulder shrug symmetric [Cranial Nerves Hypoglossal (XII)] : there was no tongue deviation with protrusion [Motor Strength] : muscle strength was normal in all four extremities [No Muscle Atrophy] : normal bulk in all four extremities [Sensation Tactile Decrease] : light touch was intact [Sensation Pain / Temperature Decrease] : pain and temperature was intact [Sensation Vibration Decrease] : vibration was intact [Proprioception] : proprioception was intact [Balance] : balance was intact [Past-pointing] : there was no past-pointing [Tremor] : no tremor present [2+] : Ankle jerk left 2+ [Plantar Reflex Right Only] : normal on the right [Plantar Reflex Left Only] : normal on the left [Abnormal Walk] : normal gait [Nail Clubbing] : no clubbing  or cyanosis of the fingernails [Musculoskeletal - Swelling] : no joint swelling seen [Motor Tone] : muscle strength and tone were normal [FreeTextEntry1] : Increased pain left arm with limited ROM [Skin Color & Pigmentation] : normal skin color and pigmentation [Skin Turgor] : normal skin turgor [] : no rash
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
## 2020-10-26 NOTE — DISCUSSION/SUMMARY
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  We discussed the possibility of epilepsy sx in detail.  She had neuropsych testing which showed diffuse cerebral dysfunction, right greater than left.\par \par Plan:\par - Taking Clobazam 40mg bid and doing better\par - Discussed risks of CBZ in osteoporosis, but risk of stopping medication high in increasing her seizures.  She knows risk of fracture at this time and is being treated by her PMD. \par - encouraged 3 servings Ca+ in diet and low weight bearing exercises\par - reviewed seizure triggers\par - checked in Aug  CMP, CBC\par -follow up in 2-3 months.\par \par Patient seen 25 min face to face with >50% in counseling and discussion.\par \par . [Medically Refractory (seizure within the last year)] : Medically Refractory (seizure within the last year) [Complex Partial] : complex partial [Symptomatic] : symptomatic [Risks Associated with Driving/[place] Law] : As per my usual protocol, the patient was advised in regards to risks and driving privileges associated with the [place] Guidelines.  [Safety Recommendations] : The patient was advised in regards to the risk of seizures and general seizure safety recommendations including not to be bathing alone, climbing to high places and operating heavy machinery. [Compliance with Medications] : The importance of compliance with medications was reinforced. [Bone Health Education] : Patient was educated in regards to bone health and an increased risk of osteoporosis in patients with epilepsy. [Sleep Hygiene/Sleep Disruption Risks] : Sleep hygiene and the risks of sleep disruption were discussed. [Surgical Options/Risks/Benefits] : Surgical options/risks/benefits for intractable epilepsy were discussed. [Risk of Death] : Risk of death associated with seizures / SUDEP was discussed. [Opioids] : Patient was explained in detail about pain control by using opioids. Patient has signed and fully understands our guidelines for medication and drug screening.  Patient understands the side effects of opioids, including, but not limited to, drug tolerance, dependence, potential for addiction. This class of drugs is habit-forming and [place]IJ[place] regulated. The sedative effects of opioids can be potentiated by taking alcohol or any sleeping pills, along with opioids. The decision to drive is patient’s responsibility, as opioids can affect his/her driving ability and ability to concentrate. The long-term place is not clear, however, patient understands that once the pain control optimizes, the goal will be to wean off the opioids. All the issues regarding opioid treatment have been addressed satisfactorily.

## 2020-10-28 ENCOUNTER — RX RENEWAL (OUTPATIENT)
Age: 65
End: 2020-10-28

## 2020-11-18 ENCOUNTER — APPOINTMENT (OUTPATIENT)
Dept: PAIN MANAGEMENT | Facility: CLINIC | Age: 65
End: 2020-11-18
Payer: MEDICARE

## 2020-11-18 VITALS
BODY MASS INDEX: 16.73 KG/M2 | WEIGHT: 98 LBS | HEIGHT: 64 IN | HEART RATE: 83 BPM | DIASTOLIC BLOOD PRESSURE: 87 MMHG | SYSTOLIC BLOOD PRESSURE: 156 MMHG

## 2020-11-18 VITALS — TEMPERATURE: 97.3 F

## 2020-11-18 PROCEDURE — 99213 OFFICE O/P EST LOW 20 MIN: CPT

## 2020-11-18 NOTE — ASSESSMENT
[FreeTextEntry1] : Next appt 1 month due to patient;s past medical hx and increase in COVID -19. \par I will continue to monitor for toxicity and ISTOP. \par \par Dr Murphy on site , billed incident to service.  [Opioids] : Patient was explained in detail about pain control by using opioids. Patient has signed and fully understands our guidelines for medication and drug screening.  Patient understands the side effects of opioids, including, but not limited to, drug tolerance, dependence, potential for addiction. This class of drugs is habit-forming and ELIJAH regulated. The sedative effects of opioids can be potentiated by taking alcohol or any sleeping pills, along with opioids. The decision to drive is patient’s responsibility, as opioids can affect his/her driving ability and ability to concentrate. The long-term place is not clear, however, patient understands that once the pain control optimizes, the goal will be to wean off the opioids. All the issues regarding opioid treatment have been addressed satisfactorily.

## 2020-11-18 NOTE — HISTORY OF PRESENT ILLNESS
[FreeTextEntry1] : Pt returns today for a followup visit . \par Continues to have chronic pain and headaches. \par \par SHe is  taking Vicodin as prescribed. \par No signs of toxicity noted .\par Pt does not drink alcohol Pt does not drive .\par \par ISTOP #[id number]\par

## 2020-11-24 ENCOUNTER — APPOINTMENT (OUTPATIENT)
Dept: SURGICAL ONCOLOGY | Facility: CLINIC | Age: 65
End: 2020-11-24
Payer: MEDICARE

## 2020-11-24 VITALS
HEART RATE: 108 BPM | BODY MASS INDEX: 16.56 KG/M2 | HEIGHT: 64 IN | WEIGHT: 97 LBS | OXYGEN SATURATION: 95 % | DIASTOLIC BLOOD PRESSURE: 89 MMHG | RESPIRATION RATE: 15 BRPM | SYSTOLIC BLOOD PRESSURE: 160 MMHG

## 2020-11-24 PROCEDURE — 99214 OFFICE O/P EST MOD 30 MIN: CPT

## 2020-11-24 NOTE — HISTORY OF PRESENT ILLNESS
[de-identified] : 65 year old female presents for a  follow up visit for her annual breast exam.\par \par Her most recent mammo/US was done on 6/16/20. Mammogram revealed stable post lumpectomy changes in left breast. No mammographic evidence of malignancy. US revealed no sonographic evidence of malignancy. BI-RADS-2 \par \par Pertinent Hx: \par Her history is notable for a left breast lumpectomy (1998) for stage 1 left breast cancer with adjuvant chemotherapy and radiation therapy. She suffered a recurrence and had re excision twice, and then completed 5 years of tamoxifen and 5 years of Femara under the supervision of Dr. Murray. Family history of breast cancer involves her maternal aunt. Patient is BRCA Negative. \par MMG/US 05/24/18: No mammographic evidence of malignancy. BIRADS 2.\par \par Today, on 11/24/20,  the pt was without any complaints. Denies palpable breast masses, nipple discharge, skin changes, inversion or breast pain. Denies constitutional symptoms.

## 2020-12-15 ENCOUNTER — APPOINTMENT (OUTPATIENT)
Dept: PAIN MANAGEMENT | Facility: CLINIC | Age: 65
End: 2020-12-15
Payer: MEDICARE

## 2020-12-15 PROCEDURE — 99441: CPT

## 2020-12-17 ENCOUNTER — NON-APPOINTMENT (OUTPATIENT)
Age: 65
End: 2020-12-17

## 2020-12-17 NOTE — ASSESSMENT
[FreeTextEntry1] : ISTOP reviewed and appropriate . I will continue to monitor\par no changes today\par RTO 1 month

## 2020-12-17 NOTE — REASON FOR VISIT
[Home] : at home, [unfilled] , at the time of the visit. [Medical Office: ([facility])___] : at the medical office located in  [Verbal consent obtained from patient] : the patient, [unfilled] [Follow-Up: _____] : a [unfilled] follow-up visit

## 2020-12-17 NOTE — HISTORY OF PRESENT ILLNESS
[FreeTextEntry1] : Spoke to patient over the phone .\par She continues to have chronic body  pain and headaches. \par She denies any new symptoms. SHe denies alcohol use. \par Her mood has been stable.\par No obvious signs of toxicity noted.

## 2020-12-22 ENCOUNTER — OUTPATIENT (OUTPATIENT)
Dept: OUTPATIENT SERVICES | Facility: HOSPITAL | Age: 65
LOS: 1 days | Discharge: HOME | End: 2020-12-22

## 2020-12-22 ENCOUNTER — APPOINTMENT (OUTPATIENT)
Dept: INTERNAL MEDICINE | Facility: CLINIC | Age: 65
End: 2020-12-22
Payer: MEDICARE

## 2020-12-22 VITALS
OXYGEN SATURATION: 95 % | WEIGHT: 98 LBS | TEMPERATURE: 98 F | DIASTOLIC BLOOD PRESSURE: 78 MMHG | HEART RATE: 96 BPM | BODY MASS INDEX: 16.82 KG/M2 | SYSTOLIC BLOOD PRESSURE: 118 MMHG

## 2020-12-22 DIAGNOSIS — D64.9 ANEMIA, UNSPECIFIED: ICD-10-CM

## 2020-12-22 PROCEDURE — 99072 ADDL SUPL MATRL&STAF TM PHE: CPT

## 2020-12-22 PROCEDURE — G0439: CPT

## 2020-12-22 NOTE — REVIEW OF SYSTEMS
[Vision Problems] : vision problems [Joint Pain] : joint pain [Fainting] : fainting [Insomnia] : insomnia [Negative] : Heme/Lymph

## 2020-12-22 NOTE — ASSESSMENT
[FreeTextEntry1] : 1) Wellnes check \par - diet / exercise / smoking cess discussed \par - UTD mammo / colonoscopy / pap / immunization \par - refuses to start tx for OP bc dental issues \par - has not had 30 pack yrs of smoking - so no  lung cancer screening \par - A1C 5.1/ LDL 79 \par \par 2) Seizure d/o\par - ct meds \par - f/u neuro\par \par 3) Adrenal nodule\par - MRI consistent w/ adenoma\par - hormonally silent 2108 \par
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
## 2020-12-22 NOTE — HISTORY OF PRESENT ILLNESS
[FreeTextEntry1] : pt w/ h/o epilepsy , HTN / OP / breast cancer \par here for wellness check \par feels well overall \par using can for walking bc pain in the L ankle following the slip and fall 2 yrs ago \par she has still not decided on tx for the OP bc she has been unable to take care of her dental work \par she denies any new complaints\par she ct to smoke but says she has not had a 30 pack yr smoking hx bc she smoked on and off and does not wish to get CT chest for lung cancer screening

## 2020-12-22 NOTE — HEALTH RISK ASSESSMENT
[Fair] :  ~his/her~ mood as fair [] : Yes [No] : No [No falls in past year] : Patient reported no falls in the past year [0] : 2) Feeling down, depressed, or hopeless: Not at all (0) [de-identified] : walks daily  [de-identified] : regular  [Change in mental status noted] : No change in mental status noted [None] : None [With Significant Other] : lives with significant other [On disability] : on disability [] :  [Sexually Active] : sexually active [Feels Safe at Home] : Feels safe at home [Fully functional (bathing, dressing, toileting, transferring, walking, feeding)] : Fully functional (bathing, dressing, toileting, transferring, walking, feeding) [Fully functional (using the telephone, shopping, preparing meals, housekeeping, doing laundry, using] : Fully functional and needs no help or supervision to perform IADLs (using the telephone, shopping, preparing meals, housekeeping, doing laundry, using transportation, managing medications and managing finances) [Reports changes in hearing] : Reports no changes in hearing [Reports changes in vision] : Reports changes in vision [Reports changes in dental health] : Reports changes in dental health [Smoke Detector] : smoke detector [Carbon Monoxide Detector] : carbon monoxide detector [Safety elements used in home] : safety elements used in home [Seat Belt] :  uses seat belt [MammogramDate] : 2020 [BoneDensityDate] : (+) OP  [ColonoscopyDate] : 2016  [Name: ___] : Health Care Proxy's Name: [unfilled]  [Relationship: ___] : Relationship: [unfilled] [AdvancecareDate] : 12/22/2020

## 2021-01-12 ENCOUNTER — APPOINTMENT (OUTPATIENT)
Dept: PAIN MANAGEMENT | Facility: CLINIC | Age: 66
End: 2021-01-12

## 2021-01-12 ENCOUNTER — NON-APPOINTMENT (OUTPATIENT)
Age: 66
End: 2021-01-12

## 2021-01-15 ENCOUNTER — APPOINTMENT (OUTPATIENT)
Dept: PAIN MANAGEMENT | Facility: CLINIC | Age: 66
End: 2021-01-15

## 2021-01-19 ENCOUNTER — RX RENEWAL (OUTPATIENT)
Age: 66
End: 2021-01-19

## 2021-01-19 ENCOUNTER — APPOINTMENT (OUTPATIENT)
Dept: HEMATOLOGY ONCOLOGY | Facility: CLINIC | Age: 66
End: 2021-01-19
Payer: MEDICARE

## 2021-01-19 VITALS
TEMPERATURE: 98 F | OXYGEN SATURATION: 98 % | SYSTOLIC BLOOD PRESSURE: 155 MMHG | BODY MASS INDEX: 16.27 KG/M2 | WEIGHT: 94.8 LBS | DIASTOLIC BLOOD PRESSURE: 93 MMHG | RESPIRATION RATE: 14 BRPM | HEART RATE: 89 BPM

## 2021-01-19 PROCEDURE — 99072 ADDL SUPL MATRL&STAF TM PHE: CPT

## 2021-01-19 PROCEDURE — 99214 OFFICE O/P EST MOD 30 MIN: CPT

## 2021-01-25 ENCOUNTER — APPOINTMENT (OUTPATIENT)
Dept: NEUROLOGY | Facility: CLINIC | Age: 66
End: 2021-01-25
Payer: MEDICARE

## 2021-01-25 VITALS
BODY MASS INDEX: 16.22 KG/M2 | HEIGHT: 64 IN | HEART RATE: 73 BPM | SYSTOLIC BLOOD PRESSURE: 125 MMHG | DIASTOLIC BLOOD PRESSURE: 77 MMHG | WEIGHT: 95 LBS

## 2021-01-25 PROCEDURE — 99215 OFFICE O/P EST HI 40 MIN: CPT

## 2021-01-25 PROCEDURE — 99072 ADDL SUPL MATRL&STAF TM PHE: CPT
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
## 2021-01-25 NOTE — DISCUSSION/SUMMARY
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  We discussed the possibility of epilepsy sx in detail.  She had neuropsych testing which showed diffuse cerebral dysfunction, right greater than left.\par \par Plan:\par - Taking Clobazam 40mg bid and doing better\par - Discussed risks of CBZ in osteoporosis, but risk of stopping medication high in increasing her seizures.  She knows risk of fracture at this time and is being treated by her PMD. \par - encouraged 3 servings Ca+ in diet and low weight bearing exercises\par - reviewed seizure triggers\par - f/u CMP, CBC, CBZ\par - good RX to [facility] for meds - much cheaper - looked with her\par -follow up in 2-3 months.\par \par Patient seen 40 min face to face with >50% in counseling and discussion.\par \par . [Medically Refractory (seizure within the last year)] : Medically Refractory (seizure within the last year) [Complex Partial] : complex partial [Symptomatic] : symptomatic [Risks Associated with Driving/[place] Law] : As per my usual protocol, the patient was advised in regards to risks and driving privileges associated with the [place] Guidelines.  [Safety Recommendations] : The patient was advised in regards to the risk of seizures and general seizure safety recommendations including not to be bathing alone, climbing to high places and operating heavy machinery. [Compliance with Medications] : The importance of compliance with medications was reinforced. [Bone Health Education] : Patient was educated in regards to bone health and an increased risk of osteoporosis in patients with epilepsy. [Sleep Hygiene/Sleep Disruption Risks] : Sleep hygiene and the risks of sleep disruption were discussed. [Surgical Options/Risks/Benefits] : Surgical options/risks/benefits for intractable epilepsy were discussed. [Risk of Death] : Risk of death associated with seizures / SUDEP was discussed. [Opioids] : Patient was explained in detail about pain control by using opioids. Patient has signed and fully understands our guidelines for medication and drug screening.  Patient understands the side effects of opioids, including, but not limited to, drug tolerance, dependence, potential for addiction. This class of drugs is habit-forming and [place] regulated. The sedative effects of opioids can be potentiated by taking alcohol or any sleeping pills, along with opioids. The decision to drive is patient’s responsibility, as opioids can affect his/her driving ability and ability to concentrate. The long-term place is not clear, however, patient understands that once the pain control optimizes, the goal will be to wean off the opioids. All the issues regarding opioid treatment have been addressed satisfactorily.

## 2021-01-25 NOTE — PHYSICAL EXAM
[General Appearance - Alert] : alert [General Appearance - In No Acute Distress] : in no acute distress [Person] : oriented to person [Place] : oriented to place [Time] : oriented to time [Short Term Intact] : short term memory intact [Remote Intact] : remote memory intact [Registration Intact] : recent registration memory intact [Span Intact] : the attention span was normal [Concentration Intact] : normal concentrating ability [Visual Intact] : visual attention was ~T not ~L decreased [Naming Objects] : no difficulty naming common objects [Repeating Phrases] : no difficulty repeating a phrase [Writing A Sentence] : no difficulty writing a sentence [Fluency] : fluency intact [Comprehension] : comprehension intact [Reading] : reading intact [Current Events] : adequate knowledge of current events [Past History] : adequate knowledge of personal past history [Vocabulary] : adequate range of vocabulary [Cranial Nerves Optic (II)] : visual acuity intact bilaterally,  visual fields full to confrontation, pupils equal round and reactive to light [Cranial Nerves Oculomotor (III)] : extraocular motion intact [Cranial Nerves Facial (VII)] : face symmetrical [Cranial Nerves Trigeminal (V)] : facial sensation intact symmetrically [Cranial Nerves Vestibulocochlear (VIII)] : hearing was intact bilaterally [Cranial Nerves Glossopharyngeal (IX)] : tongue and palate midline [Cranial Nerves Accessory (XI - Cranial And Spinal)] : head turning and shoulder shrug symmetric [Cranial Nerves Hypoglossal (XII)] : there was no tongue deviation with protrusion [Motor Strength] : muscle strength was normal in all four extremities [Sensation Tactile Decrease] : light touch was intact [No Muscle Atrophy] : normal bulk in all four extremities [Sensation Pain / Temperature Decrease] : pain and temperature was intact [Sensation Vibration Decrease] : vibration was intact [Proprioception] : proprioception was intact [Balance] : balance was intact [Past-pointing] : there was no past-pointing [Tremor] : no tremor present [2+] : Ankle jerk left 2+ [Plantar Reflex Right Only] : normal on the right [Plantar Reflex Left Only] : normal on the left [Abnormal Walk] : normal gait [Nail Clubbing] : no clubbing  or cyanosis of the fingernails [Musculoskeletal - Swelling] : no joint swelling seen [Motor Tone] : muscle strength and tone were normal [FreeTextEntry1] : Increased pain left arm with limited ROM [Skin Color & Pigmentation] : normal skin color and pigmentation [Skin Turgor] : normal skin turgor [] : no rash

## 2021-02-16 ENCOUNTER — NON-APPOINTMENT (OUTPATIENT)
Age: 66
End: 2021-02-16

## 2021-02-22 ENCOUNTER — APPOINTMENT (OUTPATIENT)
Dept: PAIN MANAGEMENT | Facility: CLINIC | Age: 66
End: 2021-02-22

## 2021-02-24 ENCOUNTER — NON-APPOINTMENT (OUTPATIENT)
Age: 66
End: 2021-02-24

## 2021-02-24 ENCOUNTER — APPOINTMENT (OUTPATIENT)
Dept: CARDIOLOGY | Facility: CLINIC | Age: 66
End: 2021-02-24
Payer: MEDICARE

## 2021-02-24 VITALS
SYSTOLIC BLOOD PRESSURE: 130 MMHG | OXYGEN SATURATION: 99 % | HEART RATE: 73 BPM | BODY MASS INDEX: 15.54 KG/M2 | WEIGHT: 91 LBS | RESPIRATION RATE: 16 BRPM | DIASTOLIC BLOOD PRESSURE: 69 MMHG | HEIGHT: 64 IN | TEMPERATURE: 97.4 F

## 2021-02-24 VITALS — SYSTOLIC BLOOD PRESSURE: 130 MMHG | HEART RATE: 72 BPM | DIASTOLIC BLOOD PRESSURE: 70 MMHG | OXYGEN SATURATION: 99 %

## 2021-02-24 VITALS — DIASTOLIC BLOOD PRESSURE: 75 MMHG | OXYGEN SATURATION: 98 % | SYSTOLIC BLOOD PRESSURE: 124 MMHG | HEART RATE: 82 BPM

## 2021-02-24 VITALS — HEART RATE: 80 BPM | DIASTOLIC BLOOD PRESSURE: 72 MMHG | OXYGEN SATURATION: 98 % | SYSTOLIC BLOOD PRESSURE: 127 MMHG

## 2021-02-24 PROCEDURE — 93040 RHYTHM ECG WITH REPORT: CPT | Mod: 59

## 2021-02-24 PROCEDURE — 99215 OFFICE O/P EST HI 40 MIN: CPT

## 2021-02-24 PROCEDURE — 93000 ELECTROCARDIOGRAM COMPLETE: CPT

## 2021-02-24 PROCEDURE — 36415 COLL VENOUS BLD VENIPUNCTURE: CPT

## 2021-02-24 PROCEDURE — 99072 ADDL SUPL MATRL&STAF TM PHE: CPT

## 2021-02-25 LAB
ALBUMIN SERPL ELPH-MCNC: 4.2 G/DL
ALP BLD-CCNC: 96 U/L
ALT SERPL-CCNC: 9 U/L
ANION GAP SERPL CALC-SCNC: 22 MMOL/L
APPEARANCE: ABNORMAL
AST SERPL-CCNC: 25 U/L
BACTERIA: ABNORMAL
BASOPHILS # BLD AUTO: 0.07 K/UL
BASOPHILS NFR BLD AUTO: 1.1 %
BILIRUB SERPL-MCNC: 0.2 MG/DL
BILIRUBIN URINE: NEGATIVE
BLOOD URINE: NEGATIVE
BUN SERPL-MCNC: 10 MG/DL
CALCIUM SERPL-MCNC: 9.3 MG/DL
CHLORIDE SERPL-SCNC: 104 MMOL/L
CHOLEST SERPL-MCNC: 181 MG/DL
CO2 SERPL-SCNC: 16 MMOL/L
COLOR: YELLOW
CREAT SERPL-MCNC: 0.62 MG/DL
CREAT SPEC-SCNC: 97 MG/DL
CRP SERPL HS-MCNC: 7.1 MG/L
EOSINOPHIL # BLD AUTO: 0.27 K/UL
EOSINOPHIL NFR BLD AUTO: 4.1 %
ESTIMATED AVERAGE GLUCOSE: 100 MG/DL
GLUCOSE QUALITATIVE U: NEGATIVE
GLUCOSE SERPL-MCNC: 56 MG/DL
HBA1C MFR BLD HPLC: 5.1 %
HCT VFR BLD CALC: 40.1 %
HDLC SERPL-MCNC: 58 MG/DL
HGB BLD-MCNC: 13 G/DL
HYALINE CASTS: 0 /LPF
IMM GRANULOCYTES NFR BLD AUTO: 0.3 %
KETONES URINE: NORMAL
LDLC SERPL CALC-MCNC: 108 MG/DL
LDLC SERPL DIRECT ASSAY-MCNC: 104 MG/DL
LEUKOCYTE ESTERASE URINE: NEGATIVE
LYMPHOCYTES # BLD AUTO: 1.98 K/UL
LYMPHOCYTES NFR BLD AUTO: 30 %
MAGNESIUM SERPL-MCNC: 2.2 MG/DL
MAN DIFF?: NORMAL
MCHC RBC-ENTMCNC: 32.4 GM/DL
MCHC RBC-ENTMCNC: 33.2 PG
MCV RBC AUTO: 102.6 FL
MICROALBUMIN 24H UR DL<=1MG/L-MCNC: 10.3 MG/DL
MICROALBUMIN/CREAT 24H UR-RTO: 106 MG/G
MICROSCOPIC-UA: NORMAL
MONOCYTES # BLD AUTO: 0.43 K/UL
MONOCYTES NFR BLD AUTO: 6.5 %
NEUTROPHILS # BLD AUTO: 3.84 K/UL
NEUTROPHILS NFR BLD AUTO: 58 %
NITRITE URINE: NEGATIVE
NONHDLC SERPL-MCNC: 123 MG/DL
NT-PROBNP SERPL-MCNC: 125 PG/ML
PH URINE: 6.5
PLATELET # BLD AUTO: 222 K/UL
POTASSIUM SERPL-SCNC: 5.2 MMOL/L
PROT SERPL-MCNC: 7.4 G/DL
PROTEIN URINE: ABNORMAL
RBC # BLD: 3.91 M/UL
RBC # FLD: 13.8 %
RED BLOOD CELLS URINE: 2 /HPF
SODIUM ?TM SUB UR QN: 192 MMOL/L
SODIUM SERPL-SCNC: 143 MMOL/L
SPECIFIC GRAVITY URINE: 1.03
SQUAMOUS EPITHELIAL CELLS: 4 /HPF
TRIGL SERPL-MCNC: 76 MG/DL
URINE COMMENTS: NORMAL
UROBILINOGEN URINE: ABNORMAL
UUN UR-MCNC: 854 MG/DL
WBC # FLD AUTO: 6.61 K/UL
WHITE BLOOD CELLS URINE: 5 /HPF
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
## 2021-02-25 NOTE — REASON FOR VISIT
[Follow-Up - Clinic] : a clinic follow-up of [Cardiomyopathy] : cardiomyopathy [Coronary Artery Disease] : coronary artery disease [Hyperlipidemia] : hyperlipidemia [Mitral Regurgitation] : mitral regurgitation [FreeTextEntry1] : isolated elevated BP

## 2021-02-25 NOTE — DISCUSSION/SUMMARY
[FreeTextEntry1] : \par SMOKING CESSATION:\par We all know the health risks of smoking, and most of us know that kicking the habit is the single biggest improvement to health a smoker can make. But that doesn’t make it any easier to kick the habit. Whether you’re a teen smoker or a lifetime pack-a-day smoker, quitting can be tough.\par To increase your chances of success, you need to be motivated, have social support, an understanding of what to expect, and a personal game plan. It is possible to learn how to replace your smoking habits, manage your cravings, and join the millions of people who have kicked the habit for good.\par Smoking tobacco is both a psychological habit and a physical addiction. The act of smoking is ingrained as a daily ritual and, at the same time, the nicotine from cigarettes provides a temporary, and addictive, high. Eliminating that regular fix of nicotine will cause your body to experience physical withdrawal symptoms and cravings. To successfully quit smoking, you’ll need to address both the habit and the addiction by changing your behavior and dealing with nicotine withdrawal symptoms.\par Relieving unpleasant and overwhelming feelings without cigarettes\par Managing unpleasant feelings such as stress, depression, loneliness, fear, and anxiety are some of the most common reasons why adults smoke. When you have a bad day, it can seem like your cigarettes are your only friend. Smoking can temporarily make feelings such as sadness, stress, anxiety, depression, and boredom evaporate into thin air. As much comfort as cigarettes provide, though, it’s important to remember that there are healthier (and more effective) ways to keep unpleasant feelings in check. These may include exercising, meditating, using sensory relaxation strategies, and practicing simple breathing exercises. \par For many people, an important aspect of quitting smoking is to find alternate ways to handle these difficult feelings without smoking. Even when cigarettes are no longer a part of your life, the painful and unpleasant feelings that may have prompted you to smoke in the past will still remain. So, it’s worth spending some time thinking about the different ways you intend to deal with stressful situations and the daily irritations that would normally have you reaching for a cigarette.\par Tailoring a personal game plan to your specific needs and desires can be a big help. List the reasons why you want to quit and then keep copies of the list in the places where you’d normally keep your cigarettes, such as in your jacket, purse, or car. Your reasons for quitting smoking might include:\par I will feel healthier and have more energy, whiter teeth and fresher breath.\par I will lower my risk for cancer, heart attacks, strokes, early death, cataracts, and skin wrinkling.\par I will make myself and my partner, friends, and family proud of me.\par I will no longer expose my children and others to the dangers of my second-hand smoke.\par I will have a healthier baby (If you or your partner is pregnant).\par I will have more money to spend.\par I won't have to worry: "When will I get to smoke next?"\par Questions to ask yourself\par To successfully detach from smoking, you will need to identify and address your smoking habits, the true nature of your dependency, and the techniques that work for you. These types of questions can help:\par Do you feel the need to smoke at every meal?\par Are you more of a social smoker?\par Is it a very bad addiction (more than a pack a day)? Or would a simple nicotine patch do the job?\par Is your cigarette smoking linked to other addictions, such as alcohol or gambling?\par Are you open to hypnotherapy and/or acupuncture?\par Are you someone who is open to talking about your addiction with a therapist or counselor?\par Are you interested in getting into a fitness program?\par Take the time to think of what kind of smoker you are, which moments of your life call for a cigarette, and why. This will help you to identify which tips, techniques or therapies may be most beneficial for you. \par Start your stop smoking plan with START\par S = Set a quit date.\par T = Tell family, friends, and co-workers that you plan to quit.\par A = Anticipate and plan for the challenges you'll face while quitting.\par R = Remove cigarettes and other tobacco products from your home, car, and work.\par T = Talk to your doctor about getting help to quit.\par \par After quitting, you may feel dizzy, restless, or even have strong headaches because you’re lacking the immediate release of sugar that comes from nicotine. You may also have a bigger appetite. These sugar-related cravings should only last a few days until your body adjusts so keep your sugar levels a bit higher than usual on those days by drinking plenty of juice (unless you’re a diabetic). It will help prevent the craving symptoms and help your body re-adjust back to normal.\par Tips for managing other cigarette cravings\par Cravings associated with meals\par For some smokers, ending a meal means lighting up, and the prospect of giving that up may appear daunting. TIP: replace that moment after a meal with something such as a piece of fruit, a (healthy) dessert, a square of chocolate, or a stick of gum.\par \par Alcohol and cigarettes\par Many people have a habit of smoking when they have an alcoholic drink. TIP: try non-alcoholic drinks, or try drinking only in bars, restaurant-bars, or friends’ houses where smoking inside is prohibited. Or try snacking on nuts and chips, or chewing on a straw or cocktail stick.\par \par Cravings associated with social smoking\par When friends, family, and co-workers smoke around you, it is doubly difficult to quit or avoid relapse. TIP: Your social circles need to know that you are changing your habits so talk about your decision to quit. Let them know they won’t be able to smoke when you’re in the car with them or taking a coffee break together. \par \par In your workplace, don’t take all your coffee breaks with smokers only, do something else instead, or find non-smokers to have your breaks with.\par Additional tips to deal with cravings and withdrawal symptoms\par Stay active: Keep yourself distracted and occupied, go for walks.\par Keep your hands/fingers busy: Squeeze balls, pencils, or paper clips are good substitutes to satisfy that need for tactile stimulation.\par Keep your mind busy: Read a book or magazine, listen to some music you love.\par Find an oral substitute: Keep other things around to pop in your mouth when you’re craving a cigarette.\par Good choices include mints, hard candy, carrot or celery sticks, gum, and sunflower seeds.\par Drink lots of water: Flushing toxins from your body minimizes withdrawal symptoms and helps cravings pass faster.\par Look for new ways to relax and to cope with depression or anxiety: There are a lot of ways to improve your mood without smoking. \par \par Finding the resources and support to quit smoking\par There are many different methods that have successfully helped people to quit smoking, including:\par Quitting smoking cold turkey.\par Systematically decreasing the number of cigarettes you smoke.\par Reducing your intake of nicotine gradually over time.\par Using nicotine replacement therapy or non-nicotine medications to reduce withdrawal symptoms.\par Utilizing nicotine support groups.\par Trying hypnosis, acupuncture, or counseling using cognitive behavioral techniques.\par You may be successful with the first method you try. More likely, you’ll have to try a number of different methods or a combination of treatments to find the ones that work best for you.\par \par Medication therapy\par Smoking cessation medications can ease withdrawal symptoms and reduce cravings, and are most effective when used as part of a comprehensive stop smoking program monitored by your physician. Talk to your doctor about your options and whether an anti-smoking medication is right for you. U.S. Food and Drug Administration (FDA) approved options are: \par \par Nicotine Replacement Therapy\par Nicotine replacement therapy involves "replacing" cigarettes with other nicotine substitutes, such as nicotine gum or a nicotine patch. It works by delivering small and steady doses of nicotine into the body to relieve some of the withdrawal symptoms without the tars and poisonous gases found in cigarettes. This type of treatment helps smokers focus on breaking their psychological addiction and makes it easier to concentrate on learning new behaviors and coping skills.\par \par Non-Nicotine Medication\par These medications help you stop smoking by reducing cravings and withdrawal symptoms without the use of nicotine. Medications such as bupropion (Zyban) and varenicline (Chantix) are intended for short-term use only.\par \par Non-medication therapies\par There are several things you can do to stop smoking that don’t involve nicotine replacement therapy or prescription medications:\par Hypnosis\par A popular option that has produced good results. Forget anything you may have seen from stage hypnotists, hypnosis works by getting you into a deeply relaxed state where you are open to suggestions that strengthen your resolve to quit smoking and increase your negative feelings toward cigarettes. Ask your doctor to recommend a qualified smoking cessation hypnotherapist in your area or refer to the American Society of Clinical Hypnosis (ASCH) for guidelines on selecting a qualified professional.\par Acupuncture\par One of the oldest known medical techniques, acupuncture is believed to work by triggering the release of endorphins (natural pain relievers) that allow the body to relax. As a smoking cessation aid, acupuncture can be helpful in managing smoking withdrawal symptoms. Ask your doctor for a referral or search for a local practitioner at the American Association of Acupuncture and [place] [facility] (AAAOM).\par Behavioral Therapy\par Nicotine addiction is related to the habitual behaviors (the “rituals”) involved in smoking. Behavior therapy focuses on learning new coping skills and breaking those habits. The American Lung Association offers a free online smoking cessation program that focuses on behavioral change. To find a local behavioral therapist, check with your doctor or search at the Association for Behavioral and Cognitive Therapies (ABCT).\par Motivational Therapies\par Self-help books and websites can provide a number of ways to motivate yourself to quit smoking. One well known example is calculating the monetary savings. Some people have been able to find the motivation to quit just by calculating how much money they will save after they quit. It may be enough to pay for a summer vacation.\par \par [facility]\par [address]\[place] [address]\par [phone]\par [email or web address]\par \par \par WEIGHT GOALS:\par Category				BMI range - kg/m2	BMI Prime\par Very severely underweight		less than 15		less than 0.60	\par Severely underweight			from 15.0 to 16.0		from 0.60 to 0.64	\par Underweight				from 16.0 to 18.5		from 0.64 to 0.74	\par Normal (healthy weight)			from 18.5 to 25		from 0.74 to 1.0	\par Overweight				from 25 to 30		from 1.0 to 1.2	\par Obese Class I (Moderately obese)		from 30 to 35		from 1.2 to 1.4	\par Obese Class II (Severely obese)		from 35 to 40		from 1.4 to 1.6	\par Obese Class III (Very severely obese)	over 40	over 1.6				\par \par Your current weight is 91 lbs (98 lbs on 8/12/21; 100 lbs on 2/5/2020; 105 lbs on 7/31/19; 85 lbs on 1/16/19; 81 lbs on 6/21/18; 85 lbs on 1/9/18). Given current weight and height 5'4", your calculated body mass index (BMI) is 15.6 kg/sqm. Normal BMI is 18.5-25 kg/sqm. Thus current weight is in the underweight category. Abdominal waist circumference is measured at the level of the umbilicus and is thus not a pants waist measurement. Your current abdominal waist circumference is 31 in. Normal abdominal waist circumference is < 32 inches for women and < 37 inches for men.

## 2021-02-25 NOTE — ADDENDUM
[FreeTextEntry1] : I spent 60 minutes face to face time with the patient, from 10:00 to 11:00 on 2/24/21. Thirty minutes were devoted to patient counseling with emphasis on maintenance smoking cessation (see Narrative section). Prior to this visit, I reviewed office records of Mariola Blanc, Gastroenterology and Neuro / Headache/Pain management service.
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
## 2021-02-28 LAB
25(OH)D3 SERPL-MCNC: 21.1 NG/ML
T4 FREE SERPL-MCNC: 0.7 NG/DL
TSH SERPL-ACNC: 1.19 UIU/ML

## 2021-03-04 ENCOUNTER — APPOINTMENT (OUTPATIENT)
Dept: CV DIAGNOSITCS | Facility: HOSPITAL | Age: 66
End: 2021-03-04
Payer: MEDICARE

## 2021-03-04 ENCOUNTER — OUTPATIENT (OUTPATIENT)
Dept: OUTPATIENT SERVICES | Facility: HOSPITAL | Age: 66
LOS: 1 days | End: 2021-03-04

## 2021-03-04 DIAGNOSIS — I49.3 VENTRICULAR PREMATURE DEPOLARIZATION: ICD-10-CM

## 2021-03-04 DIAGNOSIS — R07.89 OTHER CHEST PAIN: ICD-10-CM

## 2021-03-04 DIAGNOSIS — I25.10 ATHEROSCLEROTIC HEART DISEASE OF NATIVE CORONARY ARTERY WITHOUT ANGINA PECTORIS: ICD-10-CM

## 2021-03-04 DIAGNOSIS — I10 ESSENTIAL (PRIMARY) HYPERTENSION: ICD-10-CM

## 2021-03-04 DIAGNOSIS — R93.1 ABNORMAL FINDINGS ON DIAGNOSTIC IMAGING OF HEART AND CORONARY CIRCULATION: ICD-10-CM

## 2021-03-04 DIAGNOSIS — Z71.9 COUNSELING, UNSPECIFIED: ICD-10-CM

## 2021-03-04 DIAGNOSIS — J44.9 CHRONIC OBSTRUCTIVE PULMONARY DISEASE, UNSPECIFIED: ICD-10-CM

## 2021-03-04 DIAGNOSIS — E78.5 HYPERLIPIDEMIA, UNSPECIFIED: ICD-10-CM

## 2021-03-04 DIAGNOSIS — G40.909 EPILEPSY, UNSPECIFIED, NOT INTRACTABLE, WITHOUT STATUS EPILEPTICUS: ICD-10-CM

## 2021-03-04 PROCEDURE — 93306 TTE W/DOPPLER COMPLETE: CPT | Mod: 26

## 2021-03-15 ENCOUNTER — NON-APPOINTMENT (OUTPATIENT)
Age: 66
End: 2021-03-15

## 2021-03-16 ENCOUNTER — APPOINTMENT (OUTPATIENT)
Dept: INTERNAL MEDICINE | Facility: CLINIC | Age: 66
End: 2021-03-16
Payer: MEDICARE

## 2021-03-16 DIAGNOSIS — M79.10 MYALGIA, UNSPECIFIED SITE: ICD-10-CM

## 2021-03-16 DIAGNOSIS — R68.83 CHILLS (WITHOUT FEVER): ICD-10-CM

## 2021-03-16 PROCEDURE — 99442: CPT

## 2021-03-16 NOTE — REVIEW OF SYSTEMS
[Fever] : no fever [Chills] : chills [Fatigue] : fatigue [Nausea] : nausea [Vomiting] : vomiting [Negative] : Respiratory

## 2021-03-16 NOTE — ASSESSMENT
[FreeTextEntry1] : 1) Chills/ nausea/ vomiting and muscle pain \par - all started 5 hrs after Covid vaccine\par -possible immunologic reaction \par - symp now better \par - ct hydration w/ pedialyte / BRAT diet \par - tylenol as needed \par - will order UA/ BMP/CBC \par - any worsening get urgent eval\par - spent 25 min w/ pt \par

## 2021-03-16 NOTE — HISTORY OF PRESENT ILLNESS
[Home] : at home, [unfilled] , at the time of the visit. [Medical Office: ([facility])___] : at the medical office located in  [Verbal consent obtained from patient] : the patient, [unfilled] [FreeTextEntry1] : pt received her Pfizer Covid vaccine  on Thursday \par about 5 hours later she developed body aches/ nausea/ vomiting \par burning at the site of the breast lumpectomy \par she c/o pain in the lower back and the groin \par no fever \par no hematuria/ dysuria\par fell once bc fatigue and weakness / did not hit her head\par has been hydrating \par making urine \par since last night vomiting stopped and pain has eased somewhat

## 2021-03-17 ENCOUNTER — LABORATORY RESULT (OUTPATIENT)
Age: 66
End: 2021-03-17

## 2021-03-18 LAB
ANION GAP SERPL CALC-SCNC: 20 MMOL/L
APPEARANCE: ABNORMAL
BASOPHILS # BLD AUTO: 0.04 K/UL
BASOPHILS NFR BLD AUTO: 0.4 %
BILIRUBIN URINE: ABNORMAL
BLOOD URINE: NEGATIVE
BUN SERPL-MCNC: 9 MG/DL
CALCIUM SERPL-MCNC: 9.3 MG/DL
CHLORIDE SERPL-SCNC: 97 MMOL/L
CO2 SERPL-SCNC: 23 MMOL/L
COLOR: ABNORMAL
CREAT SERPL-MCNC: 0.54 MG/DL
EOSINOPHIL # BLD AUTO: 0.07 K/UL
EOSINOPHIL NFR BLD AUTO: 0.7 %
GLUCOSE QUALITATIVE U: NEGATIVE
GLUCOSE SERPL-MCNC: 118 MG/DL
HCT VFR BLD CALC: 36.2 %
HGB BLD-MCNC: 11.7 G/DL
IMM GRANULOCYTES NFR BLD AUTO: 0.6 %
KETONES URINE: ABNORMAL
LEUKOCYTE ESTERASE URINE: NEGATIVE
LYMPHOCYTES # BLD AUTO: 1.6 K/UL
LYMPHOCYTES NFR BLD AUTO: 16.8 %
MAN DIFF?: NORMAL
MCHC RBC-ENTMCNC: 32.3 GM/DL
MCHC RBC-ENTMCNC: 33.4 PG
MCV RBC AUTO: 103.4 FL
MONOCYTES # BLD AUTO: 1.08 K/UL
MONOCYTES NFR BLD AUTO: 11.4 %
NEUTROPHILS # BLD AUTO: 6.66 K/UL
NEUTROPHILS NFR BLD AUTO: 70.1 %
NITRITE URINE: NEGATIVE
PH URINE: 6
PLATELET # BLD AUTO: 271 K/UL
POTASSIUM SERPL-SCNC: 4.1 MMOL/L
PROTEIN URINE: ABNORMAL
RBC # BLD: 3.5 M/UL
RBC # FLD: 13.3 %
SODIUM SERPL-SCNC: 139 MMOL/L
SPECIFIC GRAVITY URINE: 1.04
UROBILINOGEN URINE: ABNORMAL
WBC # FLD AUTO: 9.51 K/UL

## 2021-03-19 ENCOUNTER — NON-APPOINTMENT (OUTPATIENT)
Age: 66
End: 2021-03-19

## 2021-03-19 ENCOUNTER — LABORATORY RESULT (OUTPATIENT)
Age: 66
End: 2021-03-19

## 2021-03-19 DIAGNOSIS — N95.2 POSTMENOPAUSAL ATROPHIC VAGINITIS: ICD-10-CM

## 2021-03-25 LAB
APPEARANCE: ABNORMAL
BILIRUBIN URINE: NEGATIVE
BLOOD URINE: NEGATIVE
COLOR: YELLOW
GLUCOSE QUALITATIVE U: NEGATIVE
KETONES URINE: NORMAL
LEUKOCYTE ESTERASE URINE: ABNORMAL
NITRITE URINE: NEGATIVE
PH URINE: 6
PROTEIN URINE: ABNORMAL
SPECIFIC GRAVITY URINE: 1.03
UROBILINOGEN URINE: ABNORMAL

## 2021-03-27 LAB — BACTERIA UR CULT: NORMAL

## 2021-03-30 ENCOUNTER — NON-APPOINTMENT (OUTPATIENT)
Age: 66
End: 2021-03-30

## 2021-03-30 LAB — BACTERIA UR CULT: NORMAL

## 2021-04-12 ENCOUNTER — APPOINTMENT (OUTPATIENT)
Dept: PAIN MANAGEMENT | Facility: CLINIC | Age: 66
End: 2021-04-12
Payer: MEDICARE

## 2021-04-12 VITALS
BODY MASS INDEX: 15.54 KG/M2 | HEART RATE: 81 BPM | DIASTOLIC BLOOD PRESSURE: 91 MMHG | SYSTOLIC BLOOD PRESSURE: 145 MMHG | HEIGHT: 64 IN | WEIGHT: 91 LBS

## 2021-04-12 VITALS — TEMPERATURE: 94 F

## 2021-04-12 DIAGNOSIS — Z01.818 ENCOUNTER FOR OTHER PREPROCEDURAL EXAMINATION: ICD-10-CM

## 2021-04-12 PROCEDURE — 99212 OFFICE O/P EST SF 10 MIN: CPT

## 2021-04-12 PROCEDURE — 99072 ADDL SUPL MATRL&STAF TM PHE: CPT

## 2021-04-12 NOTE — HISTORY OF PRESENT ILLNESS
[FreeTextEntry1] : istop # [id number]\par Pt returns today for a follow up visit. \par Last visit was December 2020. \par Pt explains over the last several months pain to lower back and headaches have increased . Pt explains she believes her pain has been worse since getting the Pfizer COVID vaccine. \par Pt has also had issues with UTIs .\par Also scheduled  for DONA later this week ( as per Cardiology ) \par

## 2021-04-12 NOTE — REVIEW OF SYSTEMS
[Fever] : no fever [Chills] : no chills [Chest Pain] : no chest pain [Suicidal] : not suicidal [Anxiety] : anxiety

## 2021-04-12 NOTE — ASSESSMENT
[FreeTextEntry1] : No signs of toxicity noted . \par Mood has been stable .\par Pt denies alcohol use. \par I will continue to monitor ISTOP and for signs of toxicity. \par \par Dr. Murphy on site , billed incident to service.\par \par  [Opioids] : Patient was explained in detail about pain control by using opioids. Patient has signed and fully understands our guidelines for medication and drug screening.  Patient understands the side effects of opioids, including, but not limited to, drug tolerance, dependence, potential for addiction. This class of drugs is habit-forming and ELIJAH regulated. The sedative effects of opioids can be potentiated by taking alcohol or any sleeping pills, along with opioids. The decision to drive is patient’s responsibility, as opioids can affect his/her driving ability and ability to concentrate. The long-term place is not clear, however, patient understands that once the pain control optimizes, the goal will be to wean off the opioids. All the issues regarding opioid treatment have been addressed satisfactorily.

## 2021-04-12 NOTE — PHYSICAL EXAM
[General Appearance - Alert] : alert [General Appearance - Well-Appearing] : healthy appearing [] : normal voice and communication [Oriented To Time, Place, And Person] : oriented to person, place, and time [Affect] : the affect was normal [Mood] : the mood was normal [Paresis Pronator Drift Right-Sided] : no pronator drift on the right [Paresis Pronator Drift Left-Sided] : no pronator drift on the left [Abnormal Walk] : normal gait [Coordination - Dysmetria Impaired Finger-to-Nose Bilateral] : not present [Sclera] : the sclera and conjunctiva were normal [Edema] : there was no peripheral edema

## 2021-04-13 ENCOUNTER — APPOINTMENT (OUTPATIENT)
Dept: DISASTER EMERGENCY | Facility: CLINIC | Age: 66
End: 2021-04-13

## 2021-04-14 ENCOUNTER — APPOINTMENT (OUTPATIENT)
Dept: INTERNAL MEDICINE | Facility: CLINIC | Age: 66
End: 2021-04-14
Payer: MEDICARE

## 2021-04-14 VITALS
HEART RATE: 74 BPM | BODY MASS INDEX: 15.54 KG/M2 | WEIGHT: 91 LBS | SYSTOLIC BLOOD PRESSURE: 134 MMHG | DIASTOLIC BLOOD PRESSURE: 70 MMHG | HEIGHT: 64 IN | OXYGEN SATURATION: 98 % | TEMPERATURE: 98.5 F

## 2021-04-14 DIAGNOSIS — R10.2 PELVIC AND PERINEAL PAIN: ICD-10-CM

## 2021-04-14 DIAGNOSIS — F17.200 NICOTINE DEPENDENCE, UNSPECIFIED, UNCOMPLICATED: ICD-10-CM

## 2021-04-14 LAB — SARS-COV-2 N GENE NPH QL NAA+PROBE: NOT DETECTED

## 2021-04-14 PROCEDURE — 99214 OFFICE O/P EST MOD 30 MIN: CPT

## 2021-04-14 PROCEDURE — 99072 ADDL SUPL MATRL&STAF TM PHE: CPT

## 2021-04-14 RX ORDER — DOXYCYCLINE HYCLATE 100 MG/1
100 TABLET ORAL
Qty: 10 | Refills: 0 | Status: DISCONTINUED | COMMUNITY
Start: 2021-03-30 | End: 2021-04-14

## 2021-04-14 NOTE — ASSESSMENT
[FreeTextEntry1] : 1) suprapubic pain \par - resolved \par - will recheck UA\par - will order US bladder ( smoker / (+) family h/o bladder cancer) \par \par 2) HL \par - stable\par - ct meds \par \par 3) MVR \par - no symptoms of fluid overload\par - f/u cardio \par \par 4) ch pain disorder ./ seizures disorder \par - f/u neuro \par \par 5) breast cancer \par - ct oncology follow \par - off of all meds \par \par 6) OP \par - refuses meds \par - discussed risk of fx \par \par

## 2021-04-14 NOTE — HISTORY OF PRESENT ILLNESS
[FreeTextEntry1] : f/u after tx for UTI\par she reports that the lower abd discomfort has resolved \par the UA (3/29/21) showed (+) leukoestrase , but neg nitrites \par the Culture showed aerococcus (3/26/21) \par no fever \par no flank ' pain \par \par in addition , her MVR appears to be  getting  worse \par she is scheduled for DONA this week \par \par HL\par taking meds , no AE \par LDL 2/23/21- \par \par OP \par refuses to start meds bc risk of ON of jaw\par BMD- 2017 osteoporosis \par prone to falls\par tries to do weight bearing exercise \par takes vit D / ca\par \par reviewed neuro note (4/12/21) \par worsening HA\par she is ct w/ narcotics\par \par mammo - UTD

## 2021-04-15 LAB
APPEARANCE: CLEAR
BILIRUBIN URINE: NEGATIVE
BLOOD URINE: NEGATIVE
COLOR: YELLOW
GLUCOSE QUALITATIVE U: NEGATIVE
KETONES URINE: NEGATIVE
LEUKOCYTE ESTERASE URINE: NEGATIVE
NITRITE URINE: NEGATIVE
PH URINE: 6.5
PROTEIN URINE: NEGATIVE
SPECIFIC GRAVITY URINE: 1.02
UROBILINOGEN URINE: NORMAL

## 2021-04-16 ENCOUNTER — APPOINTMENT (OUTPATIENT)
Dept: CV DIAGNOSITCS | Facility: HOSPITAL | Age: 66
End: 2021-04-16
Payer: MEDICARE

## 2021-04-16 ENCOUNTER — OUTPATIENT (OUTPATIENT)
Dept: OUTPATIENT SERVICES | Facility: HOSPITAL | Age: 66
LOS: 1 days | End: 2021-04-16

## 2021-04-16 DIAGNOSIS — I34.0 NONRHEUMATIC MITRAL (VALVE) INSUFFICIENCY: ICD-10-CM

## 2021-04-16 PROCEDURE — 93325 DOPPLER ECHO COLOR FLOW MAPG: CPT | Mod: 26,GC

## 2021-04-16 PROCEDURE — 93320 DOPPLER ECHO COMPLETE: CPT | Mod: 26,GC

## 2021-04-16 PROCEDURE — 93312 ECHO TRANSESOPHAGEAL: CPT | Mod: 26

## 2021-04-21 ENCOUNTER — NON-APPOINTMENT (OUTPATIENT)
Age: 66
End: 2021-04-21

## 2021-04-27 ENCOUNTER — NON-APPOINTMENT (OUTPATIENT)
Age: 66
End: 2021-04-27

## 2021-04-27 DIAGNOSIS — I34.0 NONRHEUMATIC MITRAL (VALVE) INSUFFICIENCY: ICD-10-CM

## 2021-04-30 ENCOUNTER — OUTPATIENT (OUTPATIENT)
Dept: OUTPATIENT SERVICES | Facility: HOSPITAL | Age: 66
LOS: 1 days | End: 2021-04-30
Payer: COMMERCIAL

## 2021-04-30 ENCOUNTER — APPOINTMENT (OUTPATIENT)
Dept: ULTRASOUND IMAGING | Facility: IMAGING CENTER | Age: 66
End: 2021-04-30
Payer: MEDICARE

## 2021-04-30 DIAGNOSIS — R10.2 PELVIC AND PERINEAL PAIN: ICD-10-CM

## 2021-04-30 PROCEDURE — 76857 US EXAM PELVIC LIMITED: CPT | Mod: 26

## 2021-04-30 PROCEDURE — 76857 US EXAM PELVIC LIMITED: CPT

## 2021-05-03 ENCOUNTER — APPOINTMENT (OUTPATIENT)
Dept: NEUROLOGY | Facility: CLINIC | Age: 66
End: 2021-05-03
Payer: MEDICARE

## 2021-05-03 VITALS
WEIGHT: 91 LBS | HEART RATE: 100 BPM | BODY MASS INDEX: 15.54 KG/M2 | SYSTOLIC BLOOD PRESSURE: 178 MMHG | DIASTOLIC BLOOD PRESSURE: 90 MMHG | HEIGHT: 64 IN

## 2021-05-03 VITALS — SYSTOLIC BLOOD PRESSURE: 130 MMHG | DIASTOLIC BLOOD PRESSURE: 82 MMHG

## 2021-05-03 VITALS — TEMPERATURE: 97.8 F

## 2021-05-03 PROCEDURE — 99213 OFFICE O/P EST LOW 20 MIN: CPT

## 2021-05-03 PROCEDURE — 99072 ADDL SUPL MATRL&STAF TM PHE: CPT

## 2021-05-03 NOTE — PHYSICAL EXAM
[General Appearance - Alert] : alert [General Appearance - In No Acute Distress] : in no acute distress [Person] : oriented to person [Place] : oriented to place [Time] : oriented to time [Short Term Intact] : short term memory intact [Remote Intact] : remote memory intact [Registration Intact] : recent registration memory intact [Span Intact] : the attention span was normal [Concentration Intact] : normal concentrating ability [Visual Intact] : visual attention was ~T not ~L decreased [Naming Objects] : no difficulty naming common objects [Repeating Phrases] : no difficulty repeating a phrase [Writing A Sentence] : no difficulty writing a sentence [Fluency] : fluency intact [Comprehension] : comprehension intact [Reading] : reading intact [Current Events] : adequate knowledge of current events [Past History] : adequate knowledge of personal past history [Vocabulary] : adequate range of vocabulary [Cranial Nerves Optic (II)] : visual acuity intact bilaterally,  visual fields full to confrontation, pupils equal round and reactive to light [Cranial Nerves Oculomotor (III)] : extraocular motion intact [Cranial Nerves Trigeminal (V)] : facial sensation intact symmetrically [Cranial Nerves Facial (VII)] : face symmetrical [Cranial Nerves Vestibulocochlear (VIII)] : hearing was intact bilaterally [Cranial Nerves Glossopharyngeal (IX)] : tongue and palate midline [Cranial Nerves Accessory (XI - Cranial And Spinal)] : head turning and shoulder shrug symmetric [Cranial Nerves Hypoglossal (XII)] : there was no tongue deviation with protrusion [Motor Strength] : muscle strength was normal in all four extremities [No Muscle Atrophy] : normal bulk in all four extremities [Sensation Tactile Decrease] : light touch was intact [Sensation Pain / Temperature Decrease] : pain and temperature was intact [Sensation Vibration Decrease] : vibration was intact [Proprioception] : proprioception was intact [Balance] : balance was intact [2+] : Ankle jerk left 2+ [Abnormal Walk] : normal gait [Nail Clubbing] : no clubbing  or cyanosis of the fingernails [Musculoskeletal - Swelling] : no joint swelling seen [Motor Tone] : muscle strength and tone were normal [Skin Color & Pigmentation] : normal skin color and pigmentation [Skin Turgor] : normal skin turgor [] : no rash [Past-pointing] : there was no past-pointing [Tremor] : no tremor present [Plantar Reflex Right Only] : normal on the right [Plantar Reflex Left Only] : normal on the left [FreeTextEntry1] : Increased pain left arm with limited ROM

## 2021-05-03 NOTE — DISCUSSION/SUMMARY
[Medically Refractory (seizure within the last year)] : Medically Refractory (seizure within the last year) [Complex Partial] : complex partial [Symptomatic] : symptomatic [Risks Associated with Driving/[place] Law] : As per my usual protocol, the patient was advised in regards to risks and driving privileges associated with the [place] Guidelines.  [Safety Recommendations] : The patient was advised in regards to the risk of seizures and general seizure safety recommendations including not to be bathing alone, climbing to high places and operating heavy machinery. [Compliance with Medications] : The importance of compliance with medications was reinforced. [Bone Health Education] : Patient was educated in regards to bone health and an increased risk of osteoporosis in patients with epilepsy. [Sleep Hygiene/Sleep Disruption Risks] : Sleep hygiene and the risks of sleep disruption were discussed. [Surgical Options/Risks/Benefits] : Surgical options/risks/benefits for intractable epilepsy were discussed. [Risk of Death] : Risk of death associated with seizures / SUDEP was discussed. [Opioids] : Patient was explained in detail about pain control by using opioids. Patient has signed and fully understands our guidelines for medication and drug screening.  Patient understands the side effects of opioids, including, but not limited to, drug tolerance, dependence, potential for addiction. This class of drugs is habit-forming and ELIJAH regulated. The sedative effects of opioids can be potentiated by taking alcohol or any sleeping pills, along with opioids. The decision to drive is patient’s responsibility, as opioids can affect his/her driving ability and ability to concentrate. The long-term place is not clear, however, patient understands that once the pain control optimizes, the goal will be to wean off the opioids. All the issues regarding opioid treatment have been addressed satisfactorily.  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  We discussed the possibility of epilepsy sx in detail.  She had neuropsych testing which showed diffuse cerebral dysfunction, right greater than left.\par \par Plan:\par - Taking Clobazam 40mg bid and doing better\par - Discussed risks of CBZ in osteoporosis, but risk of stopping medication high in increasing her seizures.  She knows risk of fracture at this time and is being treated by her PMD. \par - encouraged 3 servings Ca+ in diet and low weight bearing exercises\par - reviewed seizure triggers\par - f/u CMP, CBC, CBZ next visit (CBC,CMP OK)\par - good RX to [facility] for meds - much cheaper - looked with her\par -follow up in 2-3 months.\par \par Total time 22 min\par \par .
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
## 2021-05-13 ENCOUNTER — NON-APPOINTMENT (OUTPATIENT)
Age: 66
End: 2021-05-13

## 2021-05-14 ENCOUNTER — APPOINTMENT (OUTPATIENT)
Dept: CARDIOTHORACIC SURGERY | Facility: CLINIC | Age: 66
End: 2021-05-14
Payer: MEDICARE

## 2021-05-14 ENCOUNTER — NON-APPOINTMENT (OUTPATIENT)
Age: 66
End: 2021-05-14

## 2021-05-14 VITALS
TEMPERATURE: 97.7 F | OXYGEN SATURATION: 100 % | DIASTOLIC BLOOD PRESSURE: 87 MMHG | HEIGHT: 64 IN | RESPIRATION RATE: 16 BRPM | SYSTOLIC BLOOD PRESSURE: 143 MMHG | WEIGHT: 91 LBS | HEART RATE: 92 BPM | BODY MASS INDEX: 15.54 KG/M2

## 2021-05-14 DIAGNOSIS — G89.29 DORSALGIA, UNSPECIFIED: ICD-10-CM

## 2021-05-14 DIAGNOSIS — F41.9 ANXIETY DISORDER, UNSPECIFIED: ICD-10-CM

## 2021-05-14 DIAGNOSIS — M54.9 DORSALGIA, UNSPECIFIED: ICD-10-CM

## 2021-05-14 PROCEDURE — 99203 OFFICE O/P NEW LOW 30 MIN: CPT

## 2021-05-14 PROCEDURE — 99205 OFFICE O/P NEW HI 60 MIN: CPT

## 2021-05-14 RX ORDER — DOXYCYCLINE 100 MG/1
100 CAPSULE ORAL TWICE DAILY
Qty: 14 | Refills: 0 | Status: COMPLETED | COMMUNITY
Start: 2021-03-27 | End: 2021-05-14

## 2021-05-14 NOTE — DISCUSSION/SUMMARY
[FreeTextEntry1] : Ms Tello is referred for evaluation of severe mitral regurgitation that appears primary, or degenerative in nature. She was seen by Dr Parker, and given her extensive comorbidities, he feels she is high risk for surgical mitral valve intervention. I have reviewed her DONA in detail with Dr Rahman, and we are in agreement that her MV anatomy appears amenable to RAND. As such, I have had an extensive discussion with her regarding RAND and the devices and trials we are participating in. She is interested in CLASP IID and has signed informed consent for such consideration. She will require a right and left cardiac catheterization, which will be scheduled with Dr Byrd--of note, she is having potential anginal symptoms and had a 50% mid LAD and a 60% mid RCA stenosis on her prior angiogram in 2019. If they appear visually similar on the upcoming angiogram, I would recommend physiologic assessment (i.e. iFR) given her symptoms (which may certainly be multifactorial in nature). Once the catheterization is completed, we will proceed with the necessary exams and screening as per study protocol. As I have explained to her in detail, if she does not meet criteria for CLASP IID, we can also consider her for a G4 MitraClip, with which Dr Parker is in agreement. I will notify Mahnaz Jesus and Carson of my findings and recommendations. She was seen by our research team and provided a copy of the signed informed consent. I discussed the potential risks (and benefits) of the procedures in extensive detail.

## 2021-05-14 NOTE — CARDIOLOGY SUMMARY
[de-identified] : 3/7/2017 EXERCISE NUCLEAR STRESS TEST Abnormal Study. Myocardial Perfusion SPECT results are abnormal. There is a small, severe defect in anteroapical wall that is fixed, suggestive of infarct. Post-stress gated wall motion analysis was performed (LVEF = 66 %;LVEDV = 64 ml.), revealing normal LV function. RV function appeared normal. *** Compared with Nuclear/Stress test of 9/22/2016, no significant changes noted. Previous LVEF = 66% with EDV 70 ml \par  [de-identified] : 4/16/21 DONA revealed Mitral annular calcification and calcified mitral leaflets with normal diastolic opening. Possible cleft of the P2 scallop. Severe, central mitral regurgitation. MR volume 65 cc. ERO 0.35 cm squared.Calcified trileaflet aortic valve with normal opening.Moderate aortic regurgitation. Vena contracta 0.4 cm.Left atrial enlargement. No left atrial or left atrial appendage thrombus. Normal left ventricular systolic function. No segmental wall motion abnormalities.\par \par 3/4/21 TTE revealed Mitral annular calcification, otherwise normal mitral valve. Grossly moderate-severe mitral regurgitation, which may have been underestimated.Calcified trileaflet aortic valve with normal opening. Mild-moderate aortic regurgitation. Mild to moderate segmental left ventricular systolic dysfunction. Hypokinesis of the inferolateral wall, basal inferior wall, and basal inferoseptum.\par  [de-identified] : 10/15/2019 CATH The coronary circulation is right dominant. Proximal left main: discrete 10% stenosis. LAD: minor luminal irregularities with no flow limiting lesions. Mid LAD: vessel was small sized with a tubular 50% stenosis in the proximal third of the vessel segment. There was CATHY grade 3 flow through the vessel (brisk flow). Circumflex: minor luminal irregularities with\par no flow limiting lesions. Proximal RCA: discrete 30% stenosis. Mid RCA: tubular 60% stenosis. There was CATHY grade 3 flow through the vessel (brisk flow). \par

## 2021-05-14 NOTE — HISTORY OF PRESENT ILLNESS
[FreeTextEntry1] : ANTIONETTE GONZALEZ is a 65 year old female here at the request of Dr. Artis Jesus for evaluation of mitral regurgitation and candidacy for catheter based intervention. To review, she has a PMH of non-obstructive CAD, HTN, HLD, MR, PVCs, COPD, DJD, chronic migraine, osteoporosis, breast cancer (stage 1 breast Ca 1998, treated with CMF, lumpectomy and radiation, started on tamoxifen 1999- 2004 then Femara 10/2004 - 11/2009) , and seizure disorder since childhood. She is a current smoker (1/2ppd).\par \par She does admit to "being tired earlier than normal" and wanting to go to sleep earlier and earlier over the prior 1 year. She feels "like a lazy bum at times" and puts off doing things that she used to do. When she walks "I have to take it slow" which in part she attributes to a prior foot fracture. She does yard work and "it's so so" as compared to prior. She has tightness occasionally and point to the left side of her sternum and chest wall. She reports having "a minor heart attack 2 or 3 years ago" and angiography in 2019 revealed moderate CAD, for which medical therapy was recommended. \par \par PCP: Dr SALINAS Blanc\par CARD: Dr CRISS Jesus\par \par

## 2021-05-19 NOTE — CONSULT LETTER
[FreeTextEntry2] : Dr. Artis Jesus \par [address]\[place], [place], [address] [FreeTextEntry1] : I had the pleasure of seeing your patient, ANTIONETTE GONZALEZ,in my office today. \par \par We take a multidisciplinary team approach to patient care and consider you, the referring physician, an extension of our team. We will maintain an open line of communication with you throughout your patient's treatment course. \par \par She  is being evaluated for Severe Mitral Regurgitation . I have reviewed all of the patient's medical records and diagnostic images at the time of her  office consultation. I have enclosed a copy for your records. \par \par 4/16/21 DONA revealed Mitral annular calcification and calcified mitral leaflets with normal diastolic opening. Possible cleft of the P2 scallop. Severe, central mitral regurgitation. MR volume 65 cc. ERO 0.35 cm squared.Calcified trileaflet aortic valve with normal opening.Moderate aortic regurgitation. Vena contracta 0.4 cm.Left atrial enlargement. No left atrial or left atrial appendage thrombus. Normal left ventricular systolic function. No segmental wall motion abnormalities.\par \par 3/4/21 TTE revealed  Mitral annular calcification, otherwise normal mitral valve. Grossly moderate-severe mitral regurgitation, which may have been underestimated.Calcified trileaflet aortic valve with normal opening. Mild-moderate aortic regurgitation. Mild to moderate segmental left ventricular systolic dysfunction.  Hypokinesis of the inferolateral wall, basal inferior wall, and basal inferoseptum.\par \par I have reviewed the indications for surgery and anatomy of the heart. The patient meets criteria for surgery. I have recommended that the patient is a candidate for a Edwina Clip implantation . \par \par  I will update you on her perioperative status and disposition upon discharge. \par \par I appreciate the opportunity to care for your patient at the  [facility] based at [place]. If there are any questions or concerns, please call me  at [phone].\par \par Please see my note below. \par \par Sincerely, \par \par \par \par \par Jax Parker MD\par Director\par [facility]\par Professor \par Cardiovascular & Thoracic Surgery\par [facility] [employer].\par \par \par [facility]:\par Department of Cardiovascular and Thoracic Surgery\par [address] [place] [address], [address]\par Office: [phone]\par Fax: [phone]\par \par [facility]\par  \par Department of Cardiovascular and Thoracic Surgery\par [address] [place] [address], [address]\par Phone: [phone]\par Office: [phone]\par \par \par \par \par \par
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
## 2021-05-19 NOTE — DATA REVIEWED
[FreeTextEntry1] : 4/16/21 DONA revealed Mitral annular calcification and calcified mitral leaflets with normal diastolic opening. Possible cleft of the P2 scallop. Severe, central mitral regurgitation. MR volume 65 cc. ERO 0.35 cm squared.Calcified trileaflet aortic valve with normal opening.Moderate aortic regurgitation. Vena contracta 0.4 cm.Left atrial enlargement. No left atrial or left atrial appendage thrombus. Normal left ventricular systolic function. No segmental wall motion abnormalities.\par \par 3/4/21 TTE revealed  Mitral annular calcification, otherwise normal mitral valve. Grossly moderate-severe mitral regurgitation, which may have been underestimated.Calcified trileaflet aortic valve with normal opening. Mild-moderate aortic regurgitation. Mild to moderate segmental left ventricular systolic dysfunction.  Hypokinesis of the inferolateral wall, basal inferior wall, and basal inferoseptum.\par \par
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
## 2021-05-19 NOTE — END OF VISIT
[FreeTextEntry3] : \par I personally scribed for JOAQUIN GOMEZ on May 14 2021  1:00PM . \par \par \par \par \par Physician Attestation:\par Documented by DUSTIN VALDEZ acting as a scribe for JOAQUIN GOMEZ 05/14/2021 . \par                 All medical record entries made by the Scribe were at my, JOAQUIN GOMEZ , direction and personally dictated by me on 05/14/2021 . I have reviewed the chart and agree that the record accurately reflects my personal performance of the history, physical exam, assessment and plan\par \par

## 2021-05-19 NOTE — REVIEW OF SYSTEMS
[Feeling Tired] : not feeling tired [Palpitations] : no palpitations [Lower Ext Edema] : no lower extremity edema [Shortness Of Breath] : no shortness of breath [SOB on Exertion] : no shortness of breath during exertion [Dizziness] : no dizziness [Fainting] : no fainting [Easy Bleeding] : no tendency for easy bleeding [Easy Bruising] : no tendency for easy bruising

## 2021-05-29 ENCOUNTER — APPOINTMENT (OUTPATIENT)
Dept: DISASTER EMERGENCY | Facility: CLINIC | Age: 66
End: 2021-05-29

## 2021-05-30 LAB — SARS-COV-2 N GENE NPH QL NAA+PROBE: NOT DETECTED

## 2021-06-01 ENCOUNTER — OUTPATIENT (OUTPATIENT)
Dept: OUTPATIENT SERVICES | Facility: HOSPITAL | Age: 66
LOS: 1 days | Discharge: HOME | End: 2021-06-01
Payer: MEDICARE

## 2021-06-01 DIAGNOSIS — Z90.89 ACQUIRED ABSENCE OF OTHER ORGANS: Chronic | ICD-10-CM

## 2021-06-01 DIAGNOSIS — I34.9 NONRHEUMATIC MITRAL VALVE DISORDER, UNSPECIFIED: ICD-10-CM

## 2021-06-01 LAB
A1C WITH ESTIMATED AVERAGE GLUCOSE RESULT: 4.8 % — SIGNIFICANT CHANGE UP (ref 4–5.6)
ANION GAP SERPL CALC-SCNC: 10 MMOL/L — SIGNIFICANT CHANGE UP (ref 7–14)
BUN SERPL-MCNC: 9 MG/DL — SIGNIFICANT CHANGE UP (ref 7–23)
CALCIUM SERPL-MCNC: 9.5 MG/DL — SIGNIFICANT CHANGE UP (ref 8.4–10.5)
CHLORIDE SERPL-SCNC: 106 MMOL/L — SIGNIFICANT CHANGE UP (ref 98–107)
CO2 SERPL-SCNC: 27 MMOL/L — SIGNIFICANT CHANGE UP (ref 22–31)
CREAT SERPL-MCNC: 0.51 MG/DL — SIGNIFICANT CHANGE UP (ref 0.5–1.3)
ESTIMATED AVERAGE GLUCOSE: 91 MG/DL — SIGNIFICANT CHANGE UP (ref 68–114)
GLUCOSE SERPL-MCNC: 88 MG/DL — SIGNIFICANT CHANGE UP (ref 70–99)
HCT VFR BLD CALC: 38.3 % — SIGNIFICANT CHANGE UP (ref 34.5–45)
HGB BLD-MCNC: 12.1 G/DL — SIGNIFICANT CHANGE UP (ref 11.5–15.5)
MCHC RBC-ENTMCNC: 31.6 GM/DL — LOW (ref 32–36)
MCHC RBC-ENTMCNC: 32.6 PG — SIGNIFICANT CHANGE UP (ref 27–34)
MCV RBC AUTO: 103.2 FL — HIGH (ref 80–100)
NRBC # BLD: 0 /100 WBCS — SIGNIFICANT CHANGE UP
NRBC # FLD: 0 K/UL — SIGNIFICANT CHANGE UP
PLATELET # BLD AUTO: 195 K/UL — SIGNIFICANT CHANGE UP (ref 150–400)
POTASSIUM SERPL-MCNC: 4.9 MMOL/L — SIGNIFICANT CHANGE UP (ref 3.5–5.3)
POTASSIUM SERPL-SCNC: 4.9 MMOL/L — SIGNIFICANT CHANGE UP (ref 3.5–5.3)
RBC # BLD: 3.71 M/UL — LOW (ref 3.8–5.2)
RBC # FLD: 13.6 % — SIGNIFICANT CHANGE UP (ref 10.3–14.5)
SODIUM SERPL-SCNC: 143 MMOL/L — SIGNIFICANT CHANGE UP (ref 135–145)
WBC # BLD: 6.61 K/UL — SIGNIFICANT CHANGE UP (ref 3.8–10.5)
WBC # FLD AUTO: 6.61 K/UL — SIGNIFICANT CHANGE UP (ref 3.8–10.5)

## 2021-06-01 PROCEDURE — 93460 R&L HRT ART/VENTRICLE ANGIO: CPT | Mod: 26

## 2021-06-01 PROCEDURE — 93010 ELECTROCARDIOGRAM REPORT: CPT

## 2021-06-01 RX ORDER — SODIUM CHLORIDE 9 MG/ML
3 INJECTION INTRAMUSCULAR; INTRAVENOUS; SUBCUTANEOUS EVERY 8 HOURS
Refills: 0 | Status: DISCONTINUED | OUTPATIENT
Start: 2021-06-01 | End: 2021-06-15

## 2021-06-01 RX ORDER — CLOBAZAM 10 MG/1
2 TABLET ORAL
Qty: 0 | Refills: 0 | DISCHARGE

## 2021-06-01 RX ORDER — CARBAMAZEPINE 200 MG
200 TABLET ORAL ONCE
Refills: 0 | Status: COMPLETED | OUTPATIENT
Start: 2021-06-01 | End: 2021-06-01

## 2021-06-01 RX ADMIN — SODIUM CHLORIDE 3 MILLILITER(S): 9 INJECTION INTRAMUSCULAR; INTRAVENOUS; SUBCUTANEOUS at 17:46

## 2021-06-01 RX ADMIN — Medication 200 MILLIGRAM(S): at 17:46

## 2021-06-01 NOTE — H&P CARDIOLOGY - HISTORY OF PRESENT ILLNESS
65 y.o. female presents today for elective R/L cardiac catheterization. The patient was recently evaluated by her cardiologist, was noted to have worsening of MR on recent routine Echo, recommended to have CLASP IID. The patient c/o occasional L sided chest discomfort , denies any triggering factors, resolve on its own or with pain medication. The patient denies SOB, palpitations, dizziness, presyncope, syncope,  headache, visual disturbances, CVA, PE, DVT, FRANIK, abdominal pain, N/V/D/C, hematochezia, melena, dysuria, hematuria, fever, chills.    Echo 4/16/21 - Mitral annular calcification adn calcified mitral leaflets with normal diastolic opening. Possible cleft of P2 scallop. Severe, central MR, Moderate AI, LAE. NO LA appendage thrombus. Normal LVSF.
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
## 2021-06-01 NOTE — H&P CARDIOLOGY - REVIEW OF SYSTEMS
The patient denies SOB, palpitations, dizziness, presyncope, syncope,  headache, visual disturbances, CVA, PE, DVT, FRANKI, abdominal pain, N/V/D/C, hematochezia, melena, dysuria, hematuria, fever, chills.

## 2021-06-01 NOTE — H&P CARDIOLOGY - PSH
Brain Surgery - 30 yrs ago    Breast Cancer - Left lumpectomy x 2    Breast Cancer- Chemotherapy & Radiation therapy     Section x 3    History of Appendectomy    History of tonsillectomy

## 2021-06-03 ENCOUNTER — NON-APPOINTMENT (OUTPATIENT)
Age: 66
End: 2021-06-03

## 2021-06-04 PROBLEM — K27.9 PEPTIC ULCER, SITE UNSPECIFIED, UNSPECIFIED AS ACUTE OR CHRONIC, WITHOUT HEMORRHAGE OR PERFORATION: Chronic | Status: ACTIVE | Noted: 2021-06-01

## 2021-06-14 ENCOUNTER — APPOINTMENT (OUTPATIENT)
Dept: PAIN MANAGEMENT | Facility: CLINIC | Age: 66
End: 2021-06-14
Payer: MEDICARE

## 2021-06-14 VITALS
SYSTOLIC BLOOD PRESSURE: 134 MMHG | BODY MASS INDEX: 15.54 KG/M2 | HEIGHT: 64 IN | HEART RATE: 74 BPM | WEIGHT: 91 LBS | DIASTOLIC BLOOD PRESSURE: 81 MMHG

## 2021-06-14 PROCEDURE — 99212 OFFICE O/P EST SF 10 MIN: CPT

## 2021-06-14 NOTE — HISTORY OF PRESENT ILLNESS
[FreeTextEntry1] : ISTOP # [id number]\par Pt explains she will have surgery for a " cardiac clip " and cardiac catherization . \par Pt continues to have pain - headaches. \par No signs of toxicity noted. \par Mood has been anxious but stable. \par \par

## 2021-06-14 NOTE — PHYSICAL EXAM
[General Appearance - Alert] : alert [] : normal voice and communication [General Appearance - Well-Appearing] : healthy appearing [Oriented To Time, Place, And Person] : oriented to person, place, and time [Affect] : the affect was normal [Mood] : the mood was normal [Paresis Pronator Drift Right-Sided] : no pronator drift on the right [Abnormal Walk] : normal gait [Paresis Pronator Drift Left-Sided] : no pronator drift on the left [Coordination - Dysmetria Impaired Finger-to-Nose Bilateral] : not present [Sclera] : the sclera and conjunctiva were normal [Edema] : there was no peripheral edema

## 2021-06-16 ENCOUNTER — OUTPATIENT (OUTPATIENT)
Dept: OUTPATIENT SERVICES | Facility: HOSPITAL | Age: 66
LOS: 1 days | End: 2021-06-16
Payer: COMMERCIAL

## 2021-06-16 DIAGNOSIS — Z90.89 ACQUIRED ABSENCE OF OTHER ORGANS: Chronic | ICD-10-CM

## 2021-06-16 DIAGNOSIS — I34.0 NONRHEUMATIC MITRAL (VALVE) INSUFFICIENCY: ICD-10-CM

## 2021-06-16 PROCEDURE — 93306 TTE W/DOPPLER COMPLETE: CPT

## 2021-06-16 PROCEDURE — 93306 TTE W/DOPPLER COMPLETE: CPT | Mod: 26

## 2021-06-18 ENCOUNTER — APPOINTMENT (OUTPATIENT)
Dept: CV DIAGNOSTICS | Facility: HOSPITAL | Age: 66
End: 2021-06-18
Payer: MEDICARE

## 2021-06-18 ENCOUNTER — OUTPATIENT (OUTPATIENT)
Dept: OUTPATIENT SERVICES | Facility: HOSPITAL | Age: 66
LOS: 1 days | End: 2021-06-18

## 2021-06-18 DIAGNOSIS — Z90.89 ACQUIRED ABSENCE OF OTHER ORGANS: Chronic | ICD-10-CM

## 2021-06-18 DIAGNOSIS — R07.89 OTHER CHEST PAIN: ICD-10-CM

## 2021-06-18 PROCEDURE — 78452 HT MUSCLE IMAGE SPECT MULT: CPT | Mod: 26

## 2021-06-18 PROCEDURE — 93016 CV STRESS TEST SUPVJ ONLY: CPT | Mod: GC

## 2021-06-18 PROCEDURE — 93018 CV STRESS TEST I&R ONLY: CPT | Mod: GC

## 2021-06-19 ENCOUNTER — NON-APPOINTMENT (OUTPATIENT)
Age: 66
End: 2021-06-19

## 2021-06-21 ENCOUNTER — RESULT REVIEW (OUTPATIENT)
Age: 66
End: 2021-06-21

## 2021-06-21 ENCOUNTER — APPOINTMENT (OUTPATIENT)
Dept: MAMMOGRAPHY | Facility: IMAGING CENTER | Age: 66
End: 2021-06-21
Payer: MEDICARE

## 2021-06-21 ENCOUNTER — APPOINTMENT (OUTPATIENT)
Dept: ULTRASOUND IMAGING | Facility: IMAGING CENTER | Age: 66
End: 2021-06-21
Payer: MEDICARE

## 2021-06-21 ENCOUNTER — OUTPATIENT (OUTPATIENT)
Dept: OUTPATIENT SERVICES | Facility: HOSPITAL | Age: 66
LOS: 1 days | End: 2021-06-21
Payer: COMMERCIAL

## 2021-06-21 ENCOUNTER — APPOINTMENT (OUTPATIENT)
Dept: DISASTER EMERGENCY | Facility: CLINIC | Age: 66
End: 2021-06-21

## 2021-06-21 DIAGNOSIS — Z90.89 ACQUIRED ABSENCE OF OTHER ORGANS: Chronic | ICD-10-CM

## 2021-06-21 DIAGNOSIS — C50.919 MALIGNANT NEOPLASM OF UNSPECIFIED SITE OF UNSPECIFIED FEMALE BREAST: ICD-10-CM

## 2021-06-21 PROCEDURE — 77063 BREAST TOMOSYNTHESIS BI: CPT | Mod: 26

## 2021-06-21 PROCEDURE — 76641 ULTRASOUND BREAST COMPLETE: CPT | Mod: 26,50

## 2021-06-21 PROCEDURE — 77067 SCR MAMMO BI INCL CAD: CPT

## 2021-06-21 PROCEDURE — 77067 SCR MAMMO BI INCL CAD: CPT | Mod: 26

## 2021-06-21 PROCEDURE — 77063 BREAST TOMOSYNTHESIS BI: CPT

## 2021-06-21 PROCEDURE — 76641 ULTRASOUND BREAST COMPLETE: CPT

## 2021-06-22 LAB — SARS-COV-2 N GENE NPH QL NAA+PROBE: NOT DETECTED

## 2021-06-24 ENCOUNTER — OUTPATIENT (OUTPATIENT)
Dept: OUTPATIENT SERVICES | Facility: HOSPITAL | Age: 66
LOS: 1 days | End: 2021-06-24
Payer: COMMERCIAL

## 2021-06-24 ENCOUNTER — APPOINTMENT (OUTPATIENT)
Dept: CV DIAGNOSITCS | Facility: HOSPITAL | Age: 66
End: 2021-06-24

## 2021-06-24 VITALS — HEIGHT: 64 IN | WEIGHT: 91.05 LBS

## 2021-06-24 DIAGNOSIS — I25.10 ATHEROSCLEROTIC HEART DISEASE OF NATIVE CORONARY ARTERY WITHOUT ANGINA PECTORIS: ICD-10-CM

## 2021-06-24 DIAGNOSIS — Z90.89 ACQUIRED ABSENCE OF OTHER ORGANS: Chronic | ICD-10-CM

## 2021-06-24 PROCEDURE — 93320 DOPPLER ECHO COMPLETE: CPT | Mod: 26

## 2021-06-24 PROCEDURE — 76377 3D RENDER W/INTRP POSTPROCES: CPT | Mod: 26

## 2021-06-24 PROCEDURE — 93325 DOPPLER ECHO COLOR FLOW MAPG: CPT | Mod: 26

## 2021-06-24 PROCEDURE — 76377 3D RENDER W/INTRP POSTPROCES: CPT

## 2021-06-24 PROCEDURE — 93325 DOPPLER ECHO COLOR FLOW MAPG: CPT

## 2021-06-24 PROCEDURE — 93312 ECHO TRANSESOPHAGEAL: CPT

## 2021-06-24 PROCEDURE — 93312 ECHO TRANSESOPHAGEAL: CPT | Mod: 26

## 2021-06-24 PROCEDURE — 93320 DOPPLER ECHO COMPLETE: CPT

## 2021-06-24 NOTE — PRE-ANESTHESIA EVALUATION ADULT - NSANTHPMHFT_GEN_ALL_CORE
last seizure 3 weeks  mod CAD  smoker  migraines  htn  hld  pvc  copd  djd  chronic pain on hydrocodone and marijauna  right temporal lobe removed for epilepsy

## 2021-07-07 ENCOUNTER — APPOINTMENT (OUTPATIENT)
Dept: INTERNAL MEDICINE | Facility: CLINIC | Age: 66
End: 2021-07-07
Payer: MEDICARE

## 2021-07-07 VITALS
SYSTOLIC BLOOD PRESSURE: 115 MMHG | HEIGHT: 64 IN | OXYGEN SATURATION: 99 % | WEIGHT: 92 LBS | DIASTOLIC BLOOD PRESSURE: 60 MMHG | HEART RATE: 80 BPM | BODY MASS INDEX: 15.71 KG/M2 | TEMPERATURE: 98.1 F

## 2021-07-07 PROCEDURE — 99214 OFFICE O/P EST MOD 30 MIN: CPT
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
## 2021-07-07 NOTE — ASSESSMENT
[FreeTextEntry1] : 1) Breast cancer \par - no meds at present\par - follows w/ onc\par - mammo - UTD \par \par 2) CAD \par - stable\par - no ischemia on Nuc testing \par \par 3) HL\par - on statin \par \par 4) Seizures / Migraines \par - both poorly controlled\par - f/u neuro \par \par 5) OP \par - Could not tolerate PO bisphosphonate \par - refuses to take parenteral tx\par \par 6) COPD \par - ct to smoke\par - cess again adv\par = CT scan to screen for lung cancer \par \par 7 ) Colonoscopy- neg 2017 \par - F/U in 5 yrs - 2022

## 2021-07-20 ENCOUNTER — NON-APPOINTMENT (OUTPATIENT)
Age: 66
End: 2021-07-20

## 2021-07-20 ENCOUNTER — APPOINTMENT (OUTPATIENT)
Dept: THORACIC SURGERY | Facility: CLINIC | Age: 66
End: 2021-07-20
Payer: MEDICARE

## 2021-07-20 VITALS — HEIGHT: 64 IN | WEIGHT: 92 LBS | BODY MASS INDEX: 15.71 KG/M2

## 2021-07-20 DIAGNOSIS — Z12.2 ENCOUNTER FOR SCREENING FOR MALIGNANT NEOPLASM OF RESPIRATORY ORGANS: ICD-10-CM

## 2021-07-20 DIAGNOSIS — I21.9 ACUTE MYOCARDIAL INFARCTION, UNSPECIFIED: ICD-10-CM

## 2021-07-20 DIAGNOSIS — E27.8 OTHER SPECIFIED DISORDERS OF ADRENAL GLAND: ICD-10-CM

## 2021-07-20 PROCEDURE — G0296 VISIT TO DETERM LDCT ELIG: CPT

## 2021-07-20 PROCEDURE — 99406 BEHAV CHNG SMOKING 3-10 MIN: CPT

## 2021-07-21 ENCOUNTER — OUTPATIENT (OUTPATIENT)
Dept: OUTPATIENT SERVICES | Facility: HOSPITAL | Age: 66
LOS: 1 days | End: 2021-07-21
Payer: COMMERCIAL

## 2021-07-21 ENCOUNTER — APPOINTMENT (OUTPATIENT)
Dept: CT IMAGING | Facility: IMAGING CENTER | Age: 66
End: 2021-07-21
Payer: MEDICARE

## 2021-07-21 DIAGNOSIS — Z90.89 ACQUIRED ABSENCE OF OTHER ORGANS: Chronic | ICD-10-CM

## 2021-07-21 DIAGNOSIS — J44.9 CHRONIC OBSTRUCTIVE PULMONARY DISEASE, UNSPECIFIED: ICD-10-CM

## 2021-07-21 PROCEDURE — 71271 CT THORAX LUNG CANCER SCR C-: CPT

## 2021-07-21 PROCEDURE — 71271 CT THORAX LUNG CANCER SCR C-: CPT | Mod: 26

## 2021-07-28 NOTE — PLAN
[FreeTextEntry1] : Her LDCT is scheduled for 7/21/21 at the [facility] location.  She will follow up with Dr. Blanc for the results.

## 2021-07-28 NOTE — HISTORY OF PRESENT ILLNESS
[TextBox_13] : She denies hemoptysis, denies new cough, denies unexplained weight loss.\par She is a current smoker with a 20 pack year smoking history (0.4PPD x 50 years).  She is interested in support in quitting. She has a h/o breast cancer. She is referred by Dr. Karen Blanc. \par

## 2021-08-17 ENCOUNTER — APPOINTMENT (OUTPATIENT)
Dept: PAIN MANAGEMENT | Facility: CLINIC | Age: 66
End: 2021-08-17
Payer: MEDICARE

## 2021-08-17 VITALS
BODY MASS INDEX: 15.54 KG/M2 | DIASTOLIC BLOOD PRESSURE: 78 MMHG | SYSTOLIC BLOOD PRESSURE: 161 MMHG | WEIGHT: 91 LBS | HEART RATE: 83 BPM | HEIGHT: 64 IN

## 2021-08-17 DIAGNOSIS — M19.90 UNSPECIFIED OSTEOARTHRITIS, UNSPECIFIED SITE: ICD-10-CM

## 2021-08-17 DIAGNOSIS — G43.709 CHRONIC MIGRAINE W/OUT AURA, NOT INTRACTABLE, W/OUT STATUS MIGRAINOSUS: ICD-10-CM

## 2021-08-17 PROCEDURE — 99212 OFFICE O/P EST SF 10 MIN: CPT

## 2021-08-17 NOTE — ASSESSMENT
[FreeTextEntry1] : No changes in medication . \par TOx screen ordered today. \par I will continue to monitor ISTOP .\par \par Dr Murphy on site . billed incident to service. \par \par  [Opioids] : Patient was explained in detail about pain control by using opioids. Patient has signed and fully understands our guidelines for medication and drug screening.  Patient understands the side effects of opioids, including, but not limited to, drug tolerance, dependence, potential for addiction. This class of drugs is habit-forming and ELIJAH regulated. The sedative effects of opioids can be potentiated by taking alcohol or any sleeping pills, along with opioids. The decision to drive is patient’s responsibility, as opioids can affect his/her driving ability and ability to concentrate. The long-term place is not clear, however, patient understands that once the pain control optimizes, the goal will be to wean off the opioids. All the issues regarding opioid treatment have been addressed satisfactorily.

## 2021-08-17 NOTE — REVIEW OF SYSTEMS
[Fever] : no fever [Chills] : no chills [Chest Pain] : no chest pain [Palpitations] : no palpitations [Abdominal Pain] : no abdominal pain [Constipation] : no constipation [Dizziness] : no dizziness [Fainting] : no fainting

## 2021-08-17 NOTE — HISTORY OF PRESENT ILLNESS
[FreeTextEntry1] : istop#[id number]\par Pt returns today for a follow up visit .\par Continues to have left ankle pain and headaches. \par Pt denies any new symptoms.\par Mood has been stable .\par Pt reminded to avoid alcohol .\par \par Pt remains active caring for her neighbor. SHe enjoys cooking . \par UDS to be done today. \par

## 2021-08-17 NOTE — PHYSICAL EXAM
[Oriented To Time, Place, And Person] : oriented to person, place, and time [Affect] : the affect was normal [Mood] : the mood was normal [Cranial Nerves Facial (VII)] : face symmetrical [Cranial Nerves Vestibulocochlear (VIII)] : hearing was intact bilaterally [Cranial Nerves Accessory (XI - Cranial And Spinal)] : head turning and shoulder shrug symmetric [Cranial Nerves Hypoglossal (XII)] : there was no tongue deviation with protrusion [Motor Strength] : muscle strength was normal in all four extremities [Paresis Pronator Drift Left-Sided] : no pronator drift on the left [Paresis Pronator Drift Right-Sided] : no pronator drift on the right [Motor Strength Upper Extremities Bilaterally] : strength was normal in both upper extremities [Motor Strength Lower Extremities Bilaterally] : strength was normal in both lower extremities [Abnormal Walk] : normal gait [Sclera] : the sclera and conjunctiva were normal [PERRL With Normal Accommodation] : pupils were equal in size, round, reactive to light, with normal accommodation [Extraocular Movements] : extraocular movements were intact [] : no respiratory distress

## 2021-09-13 ENCOUNTER — APPOINTMENT (OUTPATIENT)
Dept: NEUROLOGY | Facility: CLINIC | Age: 66
End: 2021-09-13
Payer: MEDICARE

## 2021-09-13 VITALS
BODY MASS INDEX: 16.9 KG/M2 | WEIGHT: 99 LBS | DIASTOLIC BLOOD PRESSURE: 93 MMHG | HEART RATE: 89 BPM | SYSTOLIC BLOOD PRESSURE: 161 MMHG | HEIGHT: 64 IN

## 2021-09-13 PROCEDURE — 99214 OFFICE O/P EST MOD 30 MIN: CPT

## 2021-09-13 NOTE — PHYSICAL EXAM
[General Appearance - Alert] : alert [General Appearance - In No Acute Distress] : in no acute distress [Person] : oriented to person [Place] : oriented to place [Time] : oriented to time [Short Term Intact] : short term memory intact [Remote Intact] : remote memory intact [Registration Intact] : recent registration memory intact [Span Intact] : the attention span was normal [Concentration Intact] : normal concentrating ability [Visual Intact] : visual attention was ~T not ~L decreased [Naming Objects] : no difficulty naming common objects [Repeating Phrases] : no difficulty repeating a phrase [Writing A Sentence] : no difficulty writing a sentence [Fluency] : fluency intact [Comprehension] : comprehension intact [Reading] : reading intact [Current Events] : adequate knowledge of current events [Vocabulary] : adequate range of vocabulary [Past History] : adequate knowledge of personal past history [Cranial Nerves Optic (II)] : visual acuity intact bilaterally,  visual fields full to confrontation, pupils equal round and reactive to light [Cranial Nerves Oculomotor (III)] : extraocular motion intact [Cranial Nerves Facial (VII)] : face symmetrical [Cranial Nerves Trigeminal (V)] : facial sensation intact symmetrically [Cranial Nerves Vestibulocochlear (VIII)] : hearing was intact bilaterally [Cranial Nerves Glossopharyngeal (IX)] : tongue and palate midline [Cranial Nerves Accessory (XI - Cranial And Spinal)] : head turning and shoulder shrug symmetric [Cranial Nerves Hypoglossal (XII)] : there was no tongue deviation with protrusion [No Muscle Atrophy] : normal bulk in all four extremities [Motor Strength] : muscle strength was normal in all four extremities [Sensation Tactile Decrease] : light touch was intact [Sensation Pain / Temperature Decrease] : pain and temperature was intact [Sensation Vibration Decrease] : vibration was intact [Proprioception] : proprioception was intact [Balance] : balance was intact [Past-pointing] : there was no past-pointing [Tremor] : no tremor present [2+] : Ankle jerk left 2+ [Plantar Reflex Right Only] : normal on the right [Plantar Reflex Left Only] : normal on the left [Abnormal Walk] : normal gait [Nail Clubbing] : no clubbing  or cyanosis of the fingernails [Musculoskeletal - Swelling] : no joint swelling seen [Motor Tone] : muscle strength and tone were normal [FreeTextEntry1] : Increased pain left arm with limited ROM [Skin Color & Pigmentation] : normal skin color and pigmentation [Skin Turgor] : normal skin turgor [] : no rash

## 2021-09-13 NOTE — DISCUSSION/SUMMARY
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  We discussed the possibility of epilepsy sx in detail.  She had neuropsych testing which showed diffuse cerebral dysfunction, right greater than left.\par \par Plan:\par - Taking Clobazam 40mg bid and doing better\par - Discussed risks of CBZ in osteoporosis, but risk of stopping medication high in increasing her seizures.  She knows risk of fracture at this time and is being treated by her PMD. \par - encouraged 3 servings Ca+ in diet and low weight bearing exercises\par - reviewed seizure triggers\par - f/u CMP, CBC, CBZ next visit (CBC,CMP OK - June)\par - good RX to [facility] for meds - much cheaper - looked with her\par -follow up in 2-3 months.\par \par Total time 32 min\par \par . [Medically Refractory (seizure within the last year)] : Medically Refractory (seizure within the last year) [Complex Partial] : complex partial [Symptomatic] : symptomatic [Risks Associated with Driving/[place] Law] : As per my usual protocol, the patient was advised in regards to risks and driving privileges associated with the [place] Guidelines.  [Safety Recommendations] : The patient was advised in regards to the risk of seizures and general seizure safety recommendations including not to be bathing alone, climbing to high places and operating heavy machinery. [Compliance with Medications] : The importance of compliance with medications was reinforced. [Bone Health Education] : Patient was educated in regards to bone health and an increased risk of osteoporosis in patients with epilepsy. [Sleep Hygiene/Sleep Disruption Risks] : Sleep hygiene and the risks of sleep disruption were discussed. [Surgical Options/Risks/Benefits] : Surgical options/risks/benefits for intractable epilepsy were discussed. [Risk of Death] : Risk of death associated with seizures / SUDEP was discussed. [Opioids] : Patient was explained in detail about pain control by using opioids. Patient has signed and fully understands our guidelines for medication and drug screening.  Patient understands the side effects of opioids, including, but not limited to, drug tolerance, dependence, potential for addiction. This class of drugs is habit-forming and ELIJAH regulated. The sedative effects of opioids can be potentiated by taking alcohol or any sleeping pills, along with opioids. The decision to drive is patient’s responsibility, as opioids can affect his/her driving ability and ability to concentrate. The long-term place is not clear, however, patient understands that once the pain control optimizes, the goal will be to wean off the opioids. All the issues regarding opioid treatment have been addressed satisfactorily.
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
## 2021-09-29 ENCOUNTER — APPOINTMENT (OUTPATIENT)
Dept: CARDIOLOGY | Facility: CLINIC | Age: 66
End: 2021-09-29
Payer: MEDICARE

## 2021-09-29 ENCOUNTER — APPOINTMENT (OUTPATIENT)
Dept: INTERNAL MEDICINE | Facility: CLINIC | Age: 66
End: 2021-09-29

## 2021-09-30 ENCOUNTER — APPOINTMENT (OUTPATIENT)
Dept: CARDIOLOGY | Facility: CLINIC | Age: 66
End: 2021-09-30
Payer: MEDICARE

## 2021-09-30 ENCOUNTER — NON-APPOINTMENT (OUTPATIENT)
Age: 66
End: 2021-09-30

## 2021-09-30 VITALS — WEIGHT: 99 LBS | HEIGHT: 64 IN | BODY MASS INDEX: 16.9 KG/M2

## 2021-09-30 PROCEDURE — 99215 OFFICE O/P EST HI 40 MIN: CPT | Mod: 95

## 2021-09-30 NOTE — DISCUSSION/SUMMARY
[FreeTextEntry1] : \par SMOKING CESSATION:\par We all know the health risks of smoking, and most of us know that kicking the habit is the single biggest improvement to health a smoker can make. But that doesn’t make it any easier to kick the habit. Whether you’re a teen smoker or a lifetime pack-a-day smoker, quitting can be tough.\par To increase your chances of success, you need to be motivated, have social support, an understanding of what to expect, and a personal game plan. It is possible to learn how to replace your smoking habits, manage your cravings, and join the millions of people who have kicked the habit for good.\par Smoking tobacco is both a psychological habit and a physical addiction. The act of smoking is ingrained as a daily ritual and, at the same time, the nicotine from cigarettes provides a temporary, and addictive, high. Eliminating that regular fix of nicotine will cause your body to experience physical withdrawal symptoms and cravings. To successfully quit smoking, you’ll need to address both the habit and the addiction by changing your behavior and dealing with nicotine withdrawal symptoms.\par Relieving unpleasant and overwhelming feelings without cigarettes\par Managing unpleasant feelings such as stress, depression, loneliness, fear, and anxiety are some of the most common reasons why adults smoke. When you have a bad day, it can seem like your cigarettes are your only friend. Smoking can temporarily make feelings such as sadness, stress, anxiety, depression, and boredom evaporate into thin air. As much comfort as cigarettes provide, though, it’s important to remember that there are healthier (and more effective) ways to keep unpleasant feelings in check. These may include exercising, meditating, using sensory relaxation strategies, and practicing simple breathing exercises. \par For many people, an important aspect of quitting smoking is to find alternate ways to handle these difficult feelings without smoking. Even when cigarettes are no longer a part of your life, the painful and unpleasant feelings that may have prompted you to smoke in the past will still remain. So, it’s worth spending some time thinking about the different ways you intend to deal with stressful situations and the daily irritations that would normally have you reaching for a cigarette.\par Tailoring a personal game plan to your specific needs and desires can be a big help. List the reasons why you want to quit and then keep copies of the list in the places where you’d normally keep your cigarettes, such as in your jacket, purse, or car. Your reasons for quitting smoking might include:\par I will feel healthier and have more energy, whiter teeth and fresher breath.\par I will lower my risk for cancer, heart attacks, strokes, early death, cataracts, and skin wrinkling.\par I will make myself and my partner, friends, and family proud of me.\par I will no longer expose my children and others to the dangers of my second-hand smoke.\par I will have a healthier baby (If you or your partner is pregnant).\par I will have more money to spend.\par I won't have to worry: "When will I get to smoke next?"\par Questions to ask yourself\par To successfully detach from smoking, you will need to identify and address your smoking habits, the true nature of your dependency, and the techniques that work for you. These types of questions can help:\par Do you feel the need to smoke at every meal?\par Are you more of a social smoker?\par Is it a very bad addiction (more than a pack a day)? Or would a simple nicotine patch do the job?\par Is your cigarette smoking linked to other addictions, such as alcohol or gambling?\par Are you open to hypnotherapy and/or acupuncture?\par Are you someone who is open to talking about your addiction with a therapist or counselor?\par Are you interested in getting into a fitness program?\par Take the time to think of what kind of smoker you are, which moments of your life call for a cigarette, and why. This will help you to identify which tips, techniques or therapies may be most beneficial for you. \par Start your stop smoking plan with START\par S = Set a quit date.\par T = Tell family, friends, and co-workers that you plan to quit.\par A = Anticipate and plan for the challenges you'll face while quitting.\par R = Remove cigarettes and other tobacco products from your home, car, and work.\par T = Talk to your doctor about getting help to quit.\par \par After quitting, you may feel dizzy, restless, or even have strong headaches because you’re lacking the immediate release of sugar that comes from nicotine. You may also have a bigger appetite. These sugar-related cravings should only last a few days until your body adjusts so keep your sugar levels a bit higher than usual on those days by drinking plenty of juice (unless you’re a diabetic). It will help prevent the craving symptoms and help your body re-adjust back to normal.\par Tips for managing other cigarette cravings\par Cravings associated with meals\par For some smokers, ending a meal means lighting up, and the prospect of giving that up may appear daunting. TIP: replace that moment after a meal with something such as a piece of fruit, a (healthy) dessert, a square of chocolate, or a stick of gum.\par \par Alcohol and cigarettes\par Many people have a habit of smoking when they have an alcoholic drink. TIP: try non-alcoholic drinks, or try drinking only in bars, restaurant-bars, or friends’ houses where smoking inside is prohibited. Or try snacking on nuts and chips, or chewing on a straw or cocktail stick.\par \par Cravings associated with social smoking\par When friends, family, and co-workers smoke around you, it is doubly difficult to quit or avoid relapse. TIP: Your social circles need to know that you are changing your habits so talk about your decision to quit. Let them know they won’t be able to smoke when you’re in the car with them or taking a coffee break together. \par \par In your workplace, don’t take all your coffee breaks with smokers only, do something else instead, or find non-smokers to have your breaks with.\par Additional tips to deal with cravings and withdrawal symptoms\par Stay active: Keep yourself distracted and occupied, go for walks.\par Keep your hands/fingers busy: Squeeze balls, pencils, or paper clips are good substitutes to satisfy that need for tactile stimulation.\par Keep your mind busy: Read a book or magazine, listen to some music you love.\par Find an oral substitute: Keep other things around to pop in your mouth when you’re craving a cigarette.\par Good choices include mints, hard candy, carrot or celery sticks, gum, and sunflower seeds.\par Drink lots of water: Flushing toxins from your body minimizes withdrawal symptoms and helps cravings pass faster.\par Look for new ways to relax and to cope with depression or anxiety: There are a lot of ways to improve your mood without smoking. \par \par Finding the resources and support to quit smoking\par There are many different methods that have successfully helped people to quit smoking, including:\par Quitting smoking cold turkey.\par Systematically decreasing the number of cigarettes you smoke.\par Reducing your intake of nicotine gradually over time.\par Using nicotine replacement therapy or non-nicotine medications to reduce withdrawal symptoms.\par Utilizing nicotine support groups.\par Trying hypnosis, acupuncture, or counseling using cognitive behavioral techniques.\par You may be successful with the first method you try. More likely, you’ll have to try a number of different methods or a combination of treatments to find the ones that work best for you.\par \par Medication therapy\par Smoking cessation medications can ease withdrawal symptoms and reduce cravings, and are most effective when used as part of a comprehensive stop smoking program monitored by your physician. Talk to your doctor about your options and whether an anti-smoking medication is right for you. U.S[place] Food and Drug Administration (FDA) approved options are: \par \par Nicotine Replacement Therapy\par Nicotine replacement therapy involves "replacing" cigarettes with other nicotine substitutes, such as nicotine gum or a nicotine patch. It works by delivering small and steady doses of nicotine into the body to relieve some of the withdrawal symptoms without the tars and poisonous gases found in cigarettes. This type of treatment helps smokers focus on breaking their psychological addiction and makes it easier to concentrate on learning new behaviors and coping skills.\par \par Non-Nicotine Medication\par These medications help you stop smoking by reducing cravings and withdrawal symptoms without the use of nicotine. Medications such as bupropion (Zyban) and varenicline (Chantix) are intended for short-term use only.\par \par Non-medication therapies\par There are several things you can do to stop smoking that don’t involve nicotine replacement therapy or prescription medications:\par Hypnosis\par A popular option that has produced good results. Forget anything you may have seen from stage hypnotists, hypnosis works by getting you into a deeply relaxed state where you are open to suggestions that strengthen your resolve to quit smoking and increase your negative feelings toward cigarettes. Ask your doctor to recommend a qualified smoking cessation hypnotherapist in your area or refer to the American Society of Clinical Hypnosis (ASCH) for guidelines on selecting a qualified professional.\par Acupuncture\par One of the oldest known medical techniques, acupuncture is believed to work by triggering the release of endorphins (natural pain relievers) that allow the body to relax. As a smoking cessation aid, acupuncture can be helpful in managing smoking withdrawal symptoms. Ask your doctor for a referral or search for a local practitioner at the American Association of Acup[employer] and [place] [employer] (AAAOM).\par Behavioral Therapy\par Nicotine addiction is related to the habitual behaviors (the “rituals”) involved in smoking. Behavior therapy focuses on learning new coping skills and breaking those habits. The American Lung Association offers a free online smoking cessation program that focuses on behavioral change. To find a local behavioral therapist, check with your doctor or search at the Association for Behavioral and Cognitive Therapies (ABCT).\par Motivational Therapies\par Self-help books and websites can provide a number of ways to motivate yourself to quit smoking. One well known example is calculating the monetary savings. Some people have been able to find the motivation to quit just by calculating how much money they will save after they quit. It may be enough to pay for a summer vacation.\par \par [facility]\par [address]\[place] [address]\par [phone]\par [email or web address]\par \par \par WEIGHT GOALS:\par Category				BMI range - kg/m2	BMI Prime\par Very severely underweight		less than 15		less than 0.60	\par Severely underweight			from 15.0 to 16.0		from 0.60 to 0.64	\par Underweight				from 16.0 to 18.5		from 0.64 to 0.74	\par Normal (healthy weight)			from 18.5 to 25		from 0.74 to 1.0	\par Overweight				from 25 to 30		from 1.0 to 1.2	\par Obese Class I (Moderately obese)		from 30 to 35		from 1.2 to 1.4	\par Obese Class II (Severely obese)		from 35 to 40		from 1.4 to 1.6	\par Obese Class III (Very severely obese)	over 40	over 1.6				\par \par Your current weight is 99 lbs (91 lbs on 2/23/21; 98 lbs on 8/12/21; 100 lbs on 2/5/2020; 105 lbs on 7/31/19; 85 lbs on 1/16/19; 81 lbs on 6/21/18; 85 lbs on 1/9/18). Given current weight and height 5'4", your calculated body mass index (BMI) is 17 kg/sqm. Normal BMI is 18.5-25 kg/sqm. Thus current weight is in the underweight category. Abdominal waist circumference is measured at the level of the umbilicus and is thus not a pants waist measurement. Your current abdominal waist circumference is 32 in (31 in on 2/24/21). Normal abdominal waist circumference is < 32 inches for women and < 37 inches for men.

## 2021-09-30 NOTE — REASON FOR VISIT
[Structural Heart and Valve Disease] : structural heart and valve disease [Hyperlipidemia] : hyperlipidemia [Coronary Artery Disease] : coronary artery disease [FreeTextEntry1] : isolated elevated BP

## 2021-09-30 NOTE — CARDIOLOGY SUMMARY
[de-identified] : 6/1/21,  normal sinus rhythm at 62 bpm with T-wave abnormalities in leads II, III, aVF, V3-V6.  ms / QTc 422 ms.  Prior ECG 2/24/21 showed normal sinus rhythm at 71 bpm, with nonspecific ST depression   +   T-abnormality.   ms / QTc 403 ms. Rhythm / RR – interval analysis 2/24/21 observed  107 beats over 90 sec with mean HR 74 bpm; mean  ms (462 - 1150); Max / Min 248%; RR  and RR CV 13%. There were 6 PVCs (6% beats) [de-identified] : 6/18/21, small moderate anteroapical and distal inferior defects that are fixed, consistent with infarct. No segmental wall motion abnormality; LVEF 61%. There was no significant change compared to prior study 3/7/17. [de-identified] : 4/16/21, DONA, mitral annular calcification and calcified mitral leaflets with normal diastolic opening. There was possible cleft of the P2 scallop. There was severe, central mitral regurgitation. MR volume 645 cc; ERO 0.35 sqcm. There was mild, simple atheroma in the aortic arch and descending thoracic aorta. The aortic valve was calcified trileaflet with normal opening. There was moderate aorti regurgitation with vena contracta 0.4 cm. There was left atrial enlargement with no left atrial or left atrial appendage thrombus. There was no segmental LV wall motion abnormalities and overall LV systolic function was normal. The right atrium was normal. The right ventricle was normal in size and function. The tricuspid and pulmonic valves appeared normal with mild pulmonic regurgitation. The pericardium appeared normal, with no effusion. Agitated saline injection showed no evidence of patient foramen ovale. [de-identified] : RHC / LHC: 6/2/21, Ostial LM 30 % stenosis. pLAD 60% stenosis in the distal third of the vessel segment, at the origin of D1. dLCX 50% stenosis in the proximal third of the vessel segment. p RCA 60 % stenosis. mRCA 60 % stenosis

## 2021-09-30 NOTE — REVIEW OF SYSTEMS
[Headache] : headache [Feeling Fatigued] : feeling fatigued [Under Stress] : under stress [Negative] : Heme/Lymph [Weight Gain (___ Lbs)] : no recent weight gain [Weight Loss (___ Lbs)] : no recent weight loss [de-identified] : see HPI [de-identified] : see HPI

## 2021-09-30 NOTE — COUNSELING
[Risk of tobacco use and health benefits of smoking cessation discussed] : Risk of tobacco use and health benefits of smoking cessation discussed [Cessation strategies including cessation program discussed] : Cessation strategies including cessation program discussed [Use of nicotine replacement therapies and other medications discussed] : Use of nicotine replacement therapies and other medications discussed [Encouraged to pick a quit date and identify support needed to quit] : Encouraged to pick a quit date and identify support needed to quit [Tobacco Use Cessation Intensive Greater Than 10 Minutes] : Tobacco Use Cessation Intensive Greater Than 10 Minutes [Willing to Quit Smoking] : Not willing to quit smoking
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
## 2021-10-15 ENCOUNTER — OUTPATIENT (OUTPATIENT)
Dept: OUTPATIENT SERVICES | Facility: HOSPITAL | Age: 66
LOS: 1 days | End: 2021-10-15
Payer: COMMERCIAL

## 2021-10-15 ENCOUNTER — APPOINTMENT (OUTPATIENT)
Dept: ULTRASOUND IMAGING | Facility: HOSPITAL | Age: 66
End: 2021-10-15

## 2021-10-15 VITALS
HEIGHT: 64 IN | HEART RATE: 86 BPM | WEIGHT: 97.22 LBS | OXYGEN SATURATION: 96 % | TEMPERATURE: 98 F | RESPIRATION RATE: 16 BRPM | DIASTOLIC BLOOD PRESSURE: 81 MMHG | SYSTOLIC BLOOD PRESSURE: 100 MMHG

## 2021-10-15 DIAGNOSIS — I34.8 OTHER NONRHEUMATIC MITRAL VALVE DISORDERS: ICD-10-CM

## 2021-10-15 DIAGNOSIS — Z29.9 ENCOUNTER FOR PROPHYLACTIC MEASURES, UNSPECIFIED: ICD-10-CM

## 2021-10-15 DIAGNOSIS — Z11.52 ENCOUNTER FOR SCREENING FOR COVID-19: ICD-10-CM

## 2021-10-15 DIAGNOSIS — Z90.89 ACQUIRED ABSENCE OF OTHER ORGANS: Chronic | ICD-10-CM

## 2021-10-15 DIAGNOSIS — Z94.5 SKIN TRANSPLANT STATUS: Chronic | ICD-10-CM

## 2021-10-15 DIAGNOSIS — Z01.818 ENCOUNTER FOR OTHER PREPROCEDURAL EXAMINATION: ICD-10-CM

## 2021-10-15 LAB
ANION GAP SERPL CALC-SCNC: 15 MMOL/L — SIGNIFICANT CHANGE UP (ref 5–17)
BLD GP AB SCN SERPL QL: NEGATIVE — SIGNIFICANT CHANGE UP
BUN SERPL-MCNC: 11 MG/DL — SIGNIFICANT CHANGE UP (ref 7–23)
CALCIUM SERPL-MCNC: 9.5 MG/DL — SIGNIFICANT CHANGE UP (ref 8.4–10.5)
CHLORIDE SERPL-SCNC: 98 MMOL/L — SIGNIFICANT CHANGE UP (ref 96–108)
CO2 SERPL-SCNC: 23 MMOL/L — SIGNIFICANT CHANGE UP (ref 22–31)
CREAT SERPL-MCNC: 0.61 MG/DL — SIGNIFICANT CHANGE UP (ref 0.5–1.3)
GLUCOSE SERPL-MCNC: 79 MG/DL — SIGNIFICANT CHANGE UP (ref 70–99)
HCT VFR BLD CALC: 41.2 % — SIGNIFICANT CHANGE UP (ref 34.5–45)
HGB BLD-MCNC: 13.4 G/DL — SIGNIFICANT CHANGE UP (ref 11.5–15.5)
MCHC RBC-ENTMCNC: 32.5 GM/DL — SIGNIFICANT CHANGE UP (ref 32–36)
MCHC RBC-ENTMCNC: 32.5 PG — SIGNIFICANT CHANGE UP (ref 27–34)
MCV RBC AUTO: 100 FL — SIGNIFICANT CHANGE UP (ref 80–100)
NRBC # BLD: 0 /100 WBCS — SIGNIFICANT CHANGE UP (ref 0–0)
PLATELET # BLD AUTO: 246 K/UL — SIGNIFICANT CHANGE UP (ref 150–400)
POTASSIUM SERPL-MCNC: 4.5 MMOL/L — SIGNIFICANT CHANGE UP (ref 3.5–5.3)
POTASSIUM SERPL-SCNC: 4.5 MMOL/L — SIGNIFICANT CHANGE UP (ref 3.5–5.3)
RBC # BLD: 4.12 M/UL — SIGNIFICANT CHANGE UP (ref 3.8–5.2)
RBC # FLD: 12.7 % — SIGNIFICANT CHANGE UP (ref 10.3–14.5)
RH IG SCN BLD-IMP: POSITIVE — SIGNIFICANT CHANGE UP
SARS-COV-2 RNA SPEC QL NAA+PROBE: SIGNIFICANT CHANGE UP
SODIUM SERPL-SCNC: 136 MMOL/L — SIGNIFICANT CHANGE UP (ref 135–145)
WBC # BLD: 7.56 K/UL — SIGNIFICANT CHANGE UP (ref 3.8–10.5)
WBC # FLD AUTO: 7.56 K/UL — SIGNIFICANT CHANGE UP (ref 3.8–10.5)

## 2021-10-15 PROCEDURE — 86923 COMPATIBILITY TEST ELECTRIC: CPT

## 2021-10-15 PROCEDURE — 93880 EXTRACRANIAL BILAT STUDY: CPT | Mod: 26

## 2021-10-15 PROCEDURE — U0005: CPT

## 2021-10-15 PROCEDURE — 86850 RBC ANTIBODY SCREEN: CPT

## 2021-10-15 PROCEDURE — 87640 STAPH A DNA AMP PROBE: CPT

## 2021-10-15 PROCEDURE — 83036 HEMOGLOBIN GLYCOSYLATED A1C: CPT

## 2021-10-15 PROCEDURE — U0003: CPT

## 2021-10-15 PROCEDURE — 71046 X-RAY EXAM CHEST 2 VIEWS: CPT

## 2021-10-15 PROCEDURE — 85027 COMPLETE CBC AUTOMATED: CPT

## 2021-10-15 PROCEDURE — 86901 BLOOD TYPING SEROLOGIC RH(D): CPT

## 2021-10-15 PROCEDURE — 86900 BLOOD TYPING SEROLOGIC ABO: CPT

## 2021-10-15 PROCEDURE — 87641 MR-STAPH DNA AMP PROBE: CPT

## 2021-10-15 PROCEDURE — 80048 BASIC METABOLIC PNL TOTAL CA: CPT

## 2021-10-15 PROCEDURE — C9803: CPT

## 2021-10-15 PROCEDURE — 71046 X-RAY EXAM CHEST 2 VIEWS: CPT | Mod: 26

## 2021-10-15 PROCEDURE — 93880 EXTRACRANIAL BILAT STUDY: CPT

## 2021-10-15 PROCEDURE — 36415 COLL VENOUS BLD VENIPUNCTURE: CPT

## 2021-10-15 PROCEDURE — G0463: CPT

## 2021-10-15 RX ORDER — ROSUVASTATIN CALCIUM 5 MG/1
1 TABLET ORAL
Qty: 0 | Refills: 0 | DISCHARGE

## 2021-10-15 RX ORDER — CHLORHEXIDINE GLUCONATE 213 G/1000ML
1 SOLUTION TOPICAL ONCE
Refills: 0 | Status: DISCONTINUED | OUTPATIENT
Start: 2021-10-18 | End: 2021-10-20

## 2021-10-15 NOTE — H&P PST ADULT - PROBLEM SELECTOR PLAN 1
Pt is scheduled for a mitral clip procedure with Dr. Mendieta on 10/18/21  Covid PCR test obtained 10/15/21 at [facility]  CXR and Carotid Doppler B/L ordered and obtained  ABO, T/S on admit

## 2021-10-15 NOTE — H&P PST ADULT - ASSESSMENT
MIGUELI VTE 2.0 SCORE [CLOT updated 2019]    AGE RELATED RISK FACTORS                                                       MOBILITY RELATED FACTORS  [ ] Age 41-60 years                                            (1 Point)                    [ ] Bed rest                                                        (1 Point)  [X ] Age: 61-74 years                                           (2 Points)                  [ ] Plaster cast                                                   (2 Points)  [ ] Age= 75 years                                              (3 Points)                    [ ] Bed bound for more than 72 hours                 (2 Points)    DISEASE RELATED RISK FACTORS                                               GENDER SPECIFIC FACTORS  [ ] Edema in the lower extremities                       (1 Point)              [ ] Pregnancy                                                     (1 Point)  [ ] Varicose veins                                               (1 Point)                     [ ] Post-partum < 6 weeks                                   (1 Point)             [ ] BMI > 25 Kg/m2                                            (1 Point)                     [ ] Hormonal therapy  or oral contraception          (1 Point)                 [ ] Sepsis (in the previous month)                        (1 Point)               [ ] History of pregnancy complications                 (1 point)  [ ] Pneumonia or serious lung disease                                               [ ] Unexplained or recurrent                     (1 Point)           (in the previous month)                               (1 Point)  [ ] Abnormal pulmonary function test                     (1 Point)                 SURGERY RELATED RISK FACTORS  [ ] Acute myocardial infarction                              (1 Point)               [ ]  Section                                             (1 Point)  [X ] Congestive heart failure (in the previous month)  (1 Point)      [ ] Minor surgery                                                  (1 Point)   [ ] Inflammatory bowel disease                             (1 Point)               [ ] Arthroscopic surgery                                        (2 Points)  [ ] Central venous access                                      (2 Points)                [ X] General surgery lasting more than 45 minutes (2 points)  [ X] Malignancy- Present or previous                   (2 Points)                [ ] Elective arthroplasty                                         (5 points)    [ ] Stroke (in the previous month)                          (5 Points)                                                                                                                                                           HEMATOLOGY RELATED FACTORS                                                 TRAUMA RELATED RISK FACTORS  [ ] Prior episodes of VTE                                     (3 Points)                [ ] Fracture of the hip, pelvis, or leg                       (5 Points)  [ ] Positive family history for VTE                         (3 Points)             [ ] Acute spinal cord injury (in the previous month)  (5 Points)  [ ] Prothrombin 12713 A                                     (3 Points)               [ ] Paralysis  (less than 1 month)                             (5 Points)  [ ] Factor V Leiden                                             (3 Points)                  [ ] Multiple Trauma within 1 month                        (5 Points)  [ ] Lupus anticoagulants                                     (3 Points)                                                           [ ] Anticardiolipin antibodies                               (3 Points)                                                       [ ] High homocysteine in the blood                      (3 Points)                                             [ ] Other congenital or acquired thrombophilia      (3 Points)                                                [ ] Heparin induced thrombocytopenia                  (3 Points)                                     Total Score [    7      ]

## 2021-10-15 NOTE — H&P PST ADULT - NSICDXPASTSURGICALHX_GEN_ALL_CORE_FT
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

## 2021-10-15 NOTE — H&P PST ADULT - HISTORY OF PRESENT ILLNESS
65 year old female with PMH non-obstructive CAD, HTN, HLD, Known Mitral Regurgitation x 3 years, PVCs, Current everyday smoker (1/[id number]), COPD, DJD, Chronic Migraines, Osteopori    1980 [facility] R temporal Lobe     Covid PCR test scheduled for 10/15/21 at [facility]  Covid vaccine Pfizer 2nd dose received 4/1/2021  Denies Recent travel, Exposure or Covid symptoms        65 year old female with extensive PMH of non-obstructive CAD, Former smoker (0.5 PPD x 30 years),  HLD, Known Mitral Regurgitation x 3 years, PVCs, Current everyday smoker (1/[id number]), COPD, DJD, Chronic Migraines, Osteoporosis, Lt Breast Stage 1 CA Dx in 1998 treated with CMF, LT lumpectomy x 2 and radiation/chemo (started tamoxifen [id number], then Femara [id number]), Seizure disorder since childhood (Grandmal/Petit/Psychomotor with seizures weekly to monthly) from Scarlet Fever, Brain surgery for epilepsy in 1980 reports c/o intermittent left-sided aching chest pain x 5 minutes (pain scale 5/10) that subsides on its own with occasional radiation to left arm x 2 years. She can walk up to 2-4 miles with a cane and climb 2 flights of stairs without SOB. Her mitral regurgitation is being monitored by her cardiologist with TTE annually and recent TTE revealed severe mitral regurgitation. She denies any SOB, palpitations, dizziness, syncope or pedal edema. Pt now presents to PST for scheduled mitral clip surgery with Dr. Mendieta on 10/18/21.    Covid PCR test obtained 10/15/21 at [facility]  Covid vaccine Pfizer 2nd dose received 4/1/2021  Denies Recent travel, Exposure or Covid symptoms        65 year old female with extensive PMH of non-obstructive CAD, Former smoker (0.5 PPD x 30 years),  HLD, Known Mitral Regurgitation x 3 years, PVCs, Current everyday smoker (1/[id number]), COPD, DJD, Chronic Migraines, Osteoporosis, Lt Breast Stage 1 CA Dx in 1998 treated with CMF, LT lumpectomy x 2 and radiation/chemo (started tamoxifen [id number], then Femara [phone]), Seizure disorder since childhood (Grandmal/Petit/Psychomotor with seizures weekly to monthly) from Scarlet Fever, Brain surgery for epilepsy in 1980 reports c/o intermittent left-sided aching chest pain x 5 minutes (pain scale 5/10) that subsides on its own with occasional radiation to left arm x 2 years. She can walk up to 2-4 miles with a cane and climb 2 flights of stairs without SOB. Her mitral regurgitation is being monitored by her cardiologist with TTE annually and recent TTE revealed severe mitral regurgitation. She denies any SOB, palpitations, dizziness, syncope or pedal edema. Pt now presents to PST for scheduled mitral clip surgery with Dr. Mendieta on 10/18/21.    Covid PCR test obtained 10/15/21 at [facility]  Covid vaccine Pfizer 2nd dose received 4/1/2021  Denies Recent travel, Exposure or Covid symptoms

## 2021-10-15 NOTE — H&P PST ADULT - NEGATIVE CARDIOVASCULAR SYMPTOMS
no chest pain/no palpitations/no dyspnea on exertion no palpitations/no dyspnea on exertion/no orthopnea/no peripheral edema/no claudication

## 2021-10-15 NOTE — H&P PST ADULT - NSANTHOSAYNRD_GEN_A_CORE
No. FRANKI screening performed.  STOP BANG Legend: 0-2 = LOW Risk; 3-4 = INTERMEDIATE Risk; 5-8 = HIGH Risk
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
## 2021-10-15 NOTE — H&P PST ADULT - VENOUS THROMBOEMBOLISM
PATIENT INSTRUCTIONS      Follow-Up:  Please make an appointment with  Dr. Brad Schmid in 5 weeks                 Please note: 24 hour notice for cancellation of appointment is required.    You may receive a survey in the mail, or via the e-mail address that you have provided.  We would appreciate if you could fill out the survey and provide us with any feedback on your experience regarding your visit today. Thank you for allowing us to provide you with your health care needs.     Do not hesitate to call if you are experiencing severe pain, worsening or change in your pain, have symptoms of infection (fever, warmth, redness, increased drainage), or have any other problem that concerns you ~ [phone] (or [phone] after hours).    Please remember when requesting refills on pain medication that the request should be made by Thursday at the latest. [facility] is open Monday-Friday, 8am-5pm, and closed on the weekends.  No narcotic refills will be filled after hours.    Additional Educational Resources:  For additional resources regarding your symptoms, diagnosis, or further health information, please visit the Health Resources section on [email or web address] or the Online Health Resources section in [facility].         no

## 2021-10-16 LAB
A1C WITH ESTIMATED AVERAGE GLUCOSE RESULT: 5.2 % — SIGNIFICANT CHANGE UP (ref 4–5.6)
ESTIMATED AVERAGE GLUCOSE: 103 MG/DL — SIGNIFICANT CHANGE UP (ref 68–114)
MRSA PCR RESULT.: SIGNIFICANT CHANGE UP
S AUREUS DNA NOSE QL NAA+PROBE: SIGNIFICANT CHANGE UP

## 2021-10-18 ENCOUNTER — APPOINTMENT (OUTPATIENT)
Dept: CARDIOTHORACIC SURGERY | Facility: CLINIC | Age: 66
End: 2021-10-18

## 2021-10-18 ENCOUNTER — INPATIENT (INPATIENT)
Facility: HOSPITAL | Age: 66
LOS: 1 days | Discharge: HOME | DRG: 267 | End: 2021-10-20
Attending: INTERNAL MEDICINE | Admitting: INTERNAL MEDICINE
Payer: COMMERCIAL

## 2021-10-18 VITALS
WEIGHT: 101.63 LBS | RESPIRATION RATE: 18 BRPM | HEIGHT: 64 IN | HEART RATE: 74 BPM | TEMPERATURE: 98 F | OXYGEN SATURATION: 98 % | DIASTOLIC BLOOD PRESSURE: 71 MMHG | SYSTOLIC BLOOD PRESSURE: 115 MMHG

## 2021-10-18 DIAGNOSIS — Z98.890 OTHER SPECIFIED POSTPROCEDURAL STATES: ICD-10-CM

## 2021-10-18 DIAGNOSIS — I34.8 OTHER NONRHEUMATIC MITRAL VALVE DISORDERS: ICD-10-CM

## 2021-10-18 DIAGNOSIS — Z90.89 ACQUIRED ABSENCE OF OTHER ORGANS: Chronic | ICD-10-CM

## 2021-10-18 DIAGNOSIS — Z94.5 SKIN TRANSPLANT STATUS: Chronic | ICD-10-CM

## 2021-10-18 PROBLEM — I51.89 OTHER ILL-DEFINED HEART DISEASES: Chronic | Status: ACTIVE | Noted: 2021-10-15

## 2021-10-18 PROBLEM — E78.5 HYPERLIPIDEMIA, UNSPECIFIED: Chronic | Status: ACTIVE | Noted: 2021-10-15

## 2021-10-18 PROBLEM — I34.0 NONRHEUMATIC MITRAL (VALVE) INSUFFICIENCY: Chronic | Status: ACTIVE | Noted: 2021-10-15

## 2021-10-18 PROBLEM — R94.39 ABNORMAL RESULT OF OTHER CARDIOVASCULAR FUNCTION STUDY: Chronic | Status: ACTIVE | Noted: 2021-10-15

## 2021-10-18 PROBLEM — I35.1 NONRHEUMATIC AORTIC (VALVE) INSUFFICIENCY: Chronic | Status: ACTIVE | Noted: 2021-10-15

## 2021-10-18 PROBLEM — I25.10 ATHEROSCLEROTIC HEART DISEASE OF NATIVE CORONARY ARTERY WITHOUT ANGINA PECTORIS: Chronic | Status: ACTIVE | Noted: 2021-10-15

## 2021-10-18 PROBLEM — S92.009A UNSPECIFIED FRACTURE OF UNSPECIFIED CALCANEUS, INITIAL ENCOUNTER FOR CLOSED FRACTURE: Chronic | Status: ACTIVE | Noted: 2021-10-15

## 2021-10-18 PROBLEM — Z87.39 PERSONAL HISTORY OF OTHER DISEASES OF THE MUSCULOSKELETAL SYSTEM AND CONNECTIVE TISSUE: Chronic | Status: ACTIVE | Noted: 2021-10-15

## 2021-10-18 PROBLEM — R07.89 OTHER CHEST PAIN: Chronic | Status: ACTIVE | Noted: 2021-10-15

## 2021-10-18 PROBLEM — G93.89 OTHER SPECIFIED DISORDERS OF BRAIN: Chronic | Status: ACTIVE | Noted: 2021-10-15

## 2021-10-18 PROBLEM — F17.200 NICOTINE DEPENDENCE, UNSPECIFIED, UNCOMPLICATED: Chronic | Status: ACTIVE | Noted: 2021-10-15

## 2021-10-18 LAB
ALBUMIN SERPL ELPH-MCNC: 4.4 G/DL — SIGNIFICANT CHANGE UP (ref 3.3–5)
ALP SERPL-CCNC: 101 U/L — SIGNIFICANT CHANGE UP (ref 40–120)
ALT FLD-CCNC: 12 U/L — SIGNIFICANT CHANGE UP (ref 10–45)
ANION GAP SERPL CALC-SCNC: 13 MMOL/L — SIGNIFICANT CHANGE UP (ref 5–17)
ANION GAP SERPL CALC-SCNC: 13 MMOL/L — SIGNIFICANT CHANGE UP (ref 5–17)
APTT BLD: 29.5 SEC — SIGNIFICANT CHANGE UP (ref 27.5–35.5)
AST SERPL-CCNC: 19 U/L — SIGNIFICANT CHANGE UP (ref 10–40)
BASOPHILS # BLD AUTO: 0.06 K/UL — SIGNIFICANT CHANGE UP (ref 0–0.2)
BASOPHILS NFR BLD AUTO: 0.5 % — SIGNIFICANT CHANGE UP (ref 0–2)
BILIRUB SERPL-MCNC: 0.2 MG/DL — SIGNIFICANT CHANGE UP (ref 0.2–1.2)
BUN SERPL-MCNC: 14 MG/DL — SIGNIFICANT CHANGE UP (ref 7–23)
BUN SERPL-MCNC: 17 MG/DL — SIGNIFICANT CHANGE UP (ref 7–23)
CALCIUM SERPL-MCNC: 8.5 MG/DL — SIGNIFICANT CHANGE UP (ref 8.4–10.5)
CALCIUM SERPL-MCNC: 9 MG/DL — SIGNIFICANT CHANGE UP (ref 8.4–10.5)
CHLORIDE SERPL-SCNC: 103 MMOL/L — SIGNIFICANT CHANGE UP (ref 96–108)
CHLORIDE SERPL-SCNC: 104 MMOL/L — SIGNIFICANT CHANGE UP (ref 96–108)
CK MB BLD-MCNC: 4.7 % — HIGH (ref 0–3.5)
CK MB CFR SERPL CALC: 3.7 NG/ML — SIGNIFICANT CHANGE UP (ref 0–3.8)
CK SERPL-CCNC: 79 U/L — SIGNIFICANT CHANGE UP (ref 25–170)
CO2 SERPL-SCNC: 21 MMOL/L — LOW (ref 22–31)
CO2 SERPL-SCNC: 25 MMOL/L — SIGNIFICANT CHANGE UP (ref 22–31)
CREAT SERPL-MCNC: 0.52 MG/DL — SIGNIFICANT CHANGE UP (ref 0.5–1.3)
CREAT SERPL-MCNC: 0.59 MG/DL — SIGNIFICANT CHANGE UP (ref 0.5–1.3)
EOSINOPHIL # BLD AUTO: 0.26 K/UL — SIGNIFICANT CHANGE UP (ref 0–0.5)
EOSINOPHIL NFR BLD AUTO: 2.2 % — SIGNIFICANT CHANGE UP (ref 0–6)
GLUCOSE BLDC GLUCOMTR-MCNC: 96 MG/DL — SIGNIFICANT CHANGE UP (ref 70–99)
GLUCOSE SERPL-MCNC: 109 MG/DL — HIGH (ref 70–99)
GLUCOSE SERPL-MCNC: 75 MG/DL — SIGNIFICANT CHANGE UP (ref 70–99)
HCT VFR BLD CALC: 36.1 % — SIGNIFICANT CHANGE UP (ref 34.5–45)
HCT VFR BLD CALC: 38.9 % — SIGNIFICANT CHANGE UP (ref 34.5–45)
HGB BLD-MCNC: 11.8 G/DL — SIGNIFICANT CHANGE UP (ref 11.5–15.5)
HGB BLD-MCNC: 13 G/DL — SIGNIFICANT CHANGE UP (ref 11.5–15.5)
IMM GRANULOCYTES NFR BLD AUTO: 2.2 % — HIGH (ref 0–1.5)
INR BLD: 1.11 RATIO — SIGNIFICANT CHANGE UP (ref 0.88–1.16)
LYMPHOCYTES # BLD AUTO: 1.95 K/UL — SIGNIFICANT CHANGE UP (ref 1–3.3)
LYMPHOCYTES # BLD AUTO: 16.8 % — SIGNIFICANT CHANGE UP (ref 13–44)
MCHC RBC-ENTMCNC: 32.5 PG — SIGNIFICANT CHANGE UP (ref 27–34)
MCHC RBC-ENTMCNC: 32.7 GM/DL — SIGNIFICANT CHANGE UP (ref 32–36)
MCHC RBC-ENTMCNC: 33.3 PG — SIGNIFICANT CHANGE UP (ref 27–34)
MCHC RBC-ENTMCNC: 33.4 GM/DL — SIGNIFICANT CHANGE UP (ref 32–36)
MCV RBC AUTO: 99.4 FL — SIGNIFICANT CHANGE UP (ref 80–100)
MCV RBC AUTO: 99.7 FL — SIGNIFICANT CHANGE UP (ref 80–100)
MONOCYTES # BLD AUTO: 0.54 K/UL — SIGNIFICANT CHANGE UP (ref 0–0.9)
MONOCYTES NFR BLD AUTO: 4.7 % — SIGNIFICANT CHANGE UP (ref 2–14)
NEUTROPHILS # BLD AUTO: 8.53 K/UL — HIGH (ref 1.8–7.4)
NEUTROPHILS NFR BLD AUTO: 73.6 % — SIGNIFICANT CHANGE UP (ref 43–77)
NRBC # BLD: 0 /100 WBCS — SIGNIFICANT CHANGE UP (ref 0–0)
NRBC # BLD: 0 /100 WBCS — SIGNIFICANT CHANGE UP (ref 0–0)
PLATELET # BLD AUTO: 188 K/UL — SIGNIFICANT CHANGE UP (ref 150–400)
PLATELET # BLD AUTO: 234 K/UL — SIGNIFICANT CHANGE UP (ref 150–400)
POTASSIUM SERPL-MCNC: 4.1 MMOL/L — SIGNIFICANT CHANGE UP (ref 3.5–5.3)
POTASSIUM SERPL-MCNC: 4.5 MMOL/L — SIGNIFICANT CHANGE UP (ref 3.5–5.3)
POTASSIUM SERPL-SCNC: 4.1 MMOL/L — SIGNIFICANT CHANGE UP (ref 3.5–5.3)
POTASSIUM SERPL-SCNC: 4.5 MMOL/L — SIGNIFICANT CHANGE UP (ref 3.5–5.3)
PROT SERPL-MCNC: 7.2 G/DL — SIGNIFICANT CHANGE UP (ref 6–8.3)
PROTHROM AB SERPL-ACNC: 13.3 SEC — SIGNIFICANT CHANGE UP (ref 10.6–13.6)
RBC # BLD: 3.63 M/UL — LOW (ref 3.8–5.2)
RBC # BLD: 3.9 M/UL — SIGNIFICANT CHANGE UP (ref 3.8–5.2)
RBC # FLD: 13 % — SIGNIFICANT CHANGE UP (ref 10.3–14.5)
RBC # FLD: 13 % — SIGNIFICANT CHANGE UP (ref 10.3–14.5)
SODIUM SERPL-SCNC: 138 MMOL/L — SIGNIFICANT CHANGE UP (ref 135–145)
SODIUM SERPL-SCNC: 141 MMOL/L — SIGNIFICANT CHANGE UP (ref 135–145)
WBC # BLD: 11.6 K/UL — HIGH (ref 3.8–10.5)
WBC # BLD: 8.46 K/UL — SIGNIFICANT CHANGE UP (ref 3.8–10.5)
WBC # FLD AUTO: 11.6 K/UL — HIGH (ref 3.8–10.5)
WBC # FLD AUTO: 8.46 K/UL — SIGNIFICANT CHANGE UP (ref 3.8–10.5)

## 2021-10-18 PROCEDURE — 93010 ELECTROCARDIOGRAM REPORT: CPT

## 2021-10-18 PROCEDURE — 93355 ECHO TRANSESOPHAGEAL (TEE): CPT

## 2021-10-18 PROCEDURE — 33418 REPAIR TCAT MITRAL VALVE: CPT | Mod: Q0

## 2021-10-18 RX ORDER — MULTIVIT-MIN/FERROUS GLUCONATE 9 MG/15 ML
1 LIQUID (ML) ORAL
Qty: 0 | Refills: 0 | DISCHARGE

## 2021-10-18 RX ORDER — INFLUENZA VIRUS VACCINE 15; 15; 15; 15 UG/.5ML; UG/.5ML; UG/.5ML; UG/.5ML
0.5 SUSPENSION INTRAMUSCULAR ONCE
Refills: 0 | Status: DISCONTINUED | OUTPATIENT
Start: 2021-10-18 | End: 2021-10-20

## 2021-10-18 RX ORDER — VANCOMYCIN HCL 1 G
750 VIAL (EA) INTRAVENOUS ONCE
Refills: 0 | Status: DISCONTINUED | OUTPATIENT
Start: 2021-10-18 | End: 2021-10-18

## 2021-10-18 RX ORDER — ERGOCALCIFEROL 1.25 MG/1
1 CAPSULE ORAL
Qty: 0 | Refills: 0 | DISCHARGE

## 2021-10-18 RX ORDER — LIDOCAINE HCL 20 MG/ML
0.2 VIAL (ML) INJECTION ONCE
Refills: 0 | Status: DISCONTINUED | OUTPATIENT
Start: 2021-10-18 | End: 2021-10-18

## 2021-10-18 RX ORDER — CARBAMAZEPINE 200 MG
200 TABLET ORAL EVERY 8 HOURS
Refills: 0 | Status: DISCONTINUED | OUTPATIENT
Start: 2021-10-18 | End: 2021-10-20

## 2021-10-18 RX ORDER — CLOPIDOGREL BISULFATE 75 MG/1
75 TABLET, FILM COATED ORAL DAILY
Refills: 0 | Status: DISCONTINUED | OUTPATIENT
Start: 2021-10-18 | End: 2021-10-20

## 2021-10-18 RX ORDER — CLOBAZAM 10 MG/1
20 TABLET ORAL
Refills: 0 | Status: DISCONTINUED | OUTPATIENT
Start: 2021-10-18 | End: 2021-10-20

## 2021-10-18 RX ORDER — OXYCODONE AND ACETAMINOPHEN 5; 325 MG/1; MG/1
2 TABLET ORAL ONCE
Refills: 0 | Status: DISCONTINUED | OUTPATIENT
Start: 2021-10-18 | End: 2021-10-18

## 2021-10-18 RX ORDER — CLOBAZAM 10 MG/1
2 TABLET ORAL
Qty: 0 | Refills: 0 | DISCHARGE

## 2021-10-18 RX ORDER — SODIUM CHLORIDE 9 MG/ML
3 INJECTION INTRAMUSCULAR; INTRAVENOUS; SUBCUTANEOUS EVERY 8 HOURS
Refills: 0 | Status: DISCONTINUED | OUTPATIENT
Start: 2021-10-18 | End: 2021-10-18

## 2021-10-18 RX ORDER — ROSUVASTATIN CALCIUM 5 MG/1
1 TABLET ORAL
Qty: 0 | Refills: 0 | DISCHARGE

## 2021-10-18 RX ORDER — FENTANYL CITRATE 50 UG/ML
25 INJECTION INTRAVENOUS ONCE
Refills: 0 | Status: DISCONTINUED | OUTPATIENT
Start: 2021-10-18 | End: 2021-10-18

## 2021-10-18 RX ORDER — CARBAMAZEPINE 200 MG
1 TABLET ORAL
Qty: 0 | Refills: 0 | DISCHARGE

## 2021-10-18 RX ORDER — CEFUROXIME AXETIL 250 MG
1500 TABLET ORAL EVERY 8 HOURS
Refills: 0 | Status: COMPLETED | OUTPATIENT
Start: 2021-10-18 | End: 2021-10-19

## 2021-10-18 RX ADMIN — Medication 200 MILLIGRAM(S): at 21:53

## 2021-10-18 RX ADMIN — FENTANYL CITRATE 25 MICROGRAM(S): 50 INJECTION INTRAVENOUS at 14:49

## 2021-10-18 RX ADMIN — FENTANYL CITRATE 25 MICROGRAM(S): 50 INJECTION INTRAVENOUS at 17:13

## 2021-10-18 RX ADMIN — OXYCODONE AND ACETAMINOPHEN 2 TABLET(S): 5; 325 TABLET ORAL at 18:16

## 2021-10-18 RX ADMIN — CLOPIDOGREL BISULFATE 75 MILLIGRAM(S): 75 TABLET, FILM COATED ORAL at 19:44

## 2021-10-18 RX ADMIN — CLOBAZAM 20 MILLIGRAM(S): 10 TABLET ORAL at 21:53

## 2021-10-18 RX ADMIN — FENTANYL CITRATE 25 MICROGRAM(S): 50 INJECTION INTRAVENOUS at 17:25

## 2021-10-18 RX ADMIN — FENTANYL CITRATE 25 MICROGRAM(S): 50 INJECTION INTRAVENOUS at 14:20

## 2021-10-18 RX ADMIN — OXYCODONE AND ACETAMINOPHEN 2 TABLET(S): 5; 325 TABLET ORAL at 18:45

## 2021-10-18 RX ADMIN — Medication 100 MILLIGRAM(S): at 19:40
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
## 2021-10-18 NOTE — PROGRESS NOTE ADULT - SUBJECTIVE AND OBJECTIVE BOX
This patient has been implanted with a Mitral Edge to edge repairunder the following NCT/NIRMAL:        Mitral Transcatheter Edge to Edge Repair  [facility] Clinical Trial NCT [id number]   NIRMAL [id number]      MARA Barros  [id number]

## 2021-10-18 NOTE — CHART NOTE - NSCHARTNOTEFT_GEN_A_CORE
s/p RAND Adenike for severe MR    RFV with sutures to be removed 10/19 @ 1300 (24 hours post procedure)  LFV manual compression held  BL groin sites stable no evidence of hematoma- follow bedrest orders  no pacign wires    Plavix only, patient had ASA allergy  patient has documented PCN allergy- discussed with Dr Mendieta, not true allergy, was given Zinacef in the OR. Patient now in recovery without reaction. Cont with Zinacef x 2 doses Q8 hours, MONITOR FOR ALLERGIC REACTION    right radial sotero removed  protamine was given in the OR    Dispo: Tele bed s/p RAND Adenike for severe MR  Mitral Transcatheter Edge to Edge Repair    RFV with sutures to be removed 10/19 @ 1300 (24 hours post procedure)  LFV manual compression held  BL groin sites stable no evidence of hematoma- follow bedrest orders  no pacign wires    Plavix only, patient had ASA allergy  patient has documented PCN allergy- discussed with Dr Mendieta, not true allergy, was given Zinacef in the OR. Patient now in recovery without reaction. Cont with Zinacef x 2 doses Q8 hours, MONITOR FOR ALLERGIC REACTION    right radial sotero removed  protamine was given in the OR    Dispo: Tele bed

## 2021-10-19 ENCOUNTER — NON-APPOINTMENT (OUTPATIENT)
Age: 66
End: 2021-10-19

## 2021-10-19 ENCOUNTER — APPOINTMENT (OUTPATIENT)
Dept: PAIN MANAGEMENT | Facility: CLINIC | Age: 66
End: 2021-10-19

## 2021-10-19 LAB
ALBUMIN SERPL ELPH-MCNC: 3.7 G/DL — SIGNIFICANT CHANGE UP (ref 3.3–5)
ALP SERPL-CCNC: 86 U/L — SIGNIFICANT CHANGE UP (ref 40–120)
ALT FLD-CCNC: 7 U/L — LOW (ref 10–45)
ANION GAP SERPL CALC-SCNC: 11 MMOL/L — SIGNIFICANT CHANGE UP (ref 5–17)
AST SERPL-CCNC: 12 U/L — SIGNIFICANT CHANGE UP (ref 10–40)
BILIRUB SERPL-MCNC: 0.2 MG/DL — SIGNIFICANT CHANGE UP (ref 0.2–1.2)
BUN SERPL-MCNC: 11 MG/DL — SIGNIFICANT CHANGE UP (ref 7–23)
CALCIUM SERPL-MCNC: 8.7 MG/DL — SIGNIFICANT CHANGE UP (ref 8.4–10.5)
CHLORIDE SERPL-SCNC: 105 MMOL/L — SIGNIFICANT CHANGE UP (ref 96–108)
CK MB CFR SERPL CALC: 3.2 NG/ML — SIGNIFICANT CHANGE UP (ref 0–3.8)
CK SERPL-CCNC: 51 U/L — SIGNIFICANT CHANGE UP (ref 25–170)
CO2 SERPL-SCNC: 23 MMOL/L — SIGNIFICANT CHANGE UP (ref 22–31)
COVID-19 SPIKE DOMAIN AB INTERP: POSITIVE
COVID-19 SPIKE DOMAIN ANTIBODY RESULT: 52.4 U/ML — HIGH
CREAT SERPL-MCNC: 0.59 MG/DL — SIGNIFICANT CHANGE UP (ref 0.5–1.3)
GLUCOSE SERPL-MCNC: 102 MG/DL — HIGH (ref 70–99)
HCT VFR BLD CALC: 35.5 % — SIGNIFICANT CHANGE UP (ref 34.5–45)
HGB BLD-MCNC: 11.4 G/DL — LOW (ref 11.5–15.5)
INR BLD: 0.98 RATIO — SIGNIFICANT CHANGE UP (ref 0.88–1.16)
MCHC RBC-ENTMCNC: 31.6 PG — SIGNIFICANT CHANGE UP (ref 27–34)
MCHC RBC-ENTMCNC: 32.1 GM/DL — SIGNIFICANT CHANGE UP (ref 32–36)
MCV RBC AUTO: 98.3 FL — SIGNIFICANT CHANGE UP (ref 80–100)
NRBC # BLD: 0 /100 WBCS — SIGNIFICANT CHANGE UP (ref 0–0)
PLATELET # BLD AUTO: 181 K/UL — SIGNIFICANT CHANGE UP (ref 150–400)
POTASSIUM SERPL-MCNC: 3.8 MMOL/L — SIGNIFICANT CHANGE UP (ref 3.5–5.3)
POTASSIUM SERPL-SCNC: 3.8 MMOL/L — SIGNIFICANT CHANGE UP (ref 3.5–5.3)
PROT SERPL-MCNC: 6.4 G/DL — SIGNIFICANT CHANGE UP (ref 6–8.3)
PROTHROM AB SERPL-ACNC: 11.8 SEC — SIGNIFICANT CHANGE UP (ref 10.6–13.6)
RBC # BLD: 3.61 M/UL — LOW (ref 3.8–5.2)
RBC # FLD: 12.7 % — SIGNIFICANT CHANGE UP (ref 10.3–14.5)
SARS-COV-2 IGG+IGM SERPL QL IA: 52.4 U/ML — HIGH
SARS-COV-2 IGG+IGM SERPL QL IA: POSITIVE
SODIUM SERPL-SCNC: 139 MMOL/L — SIGNIFICANT CHANGE UP (ref 135–145)
WBC # BLD: 8.47 K/UL — SIGNIFICANT CHANGE UP (ref 3.8–10.5)
WBC # FLD AUTO: 8.47 K/UL — SIGNIFICANT CHANGE UP (ref 3.8–10.5)

## 2021-10-19 PROCEDURE — 99231 SBSQ HOSP IP/OBS SF/LOW 25: CPT

## 2021-10-19 PROCEDURE — 93010 ELECTROCARDIOGRAM REPORT: CPT

## 2021-10-19 PROCEDURE — 93306 TTE W/DOPPLER COMPLETE: CPT | Mod: 26

## 2021-10-19 RX ORDER — ACETAMINOPHEN 500 MG
650 TABLET ORAL ONCE
Refills: 0 | Status: COMPLETED | OUTPATIENT
Start: 2021-10-19 | End: 2021-10-19

## 2021-10-19 RX ORDER — OXYCODONE AND ACETAMINOPHEN 5; 325 MG/1; MG/1
2 TABLET ORAL EVERY 6 HOURS
Refills: 0 | Status: DISCONTINUED | OUTPATIENT
Start: 2021-10-19 | End: 2021-10-20

## 2021-10-19 RX ADMIN — Medication 650 MILLIGRAM(S): at 06:06

## 2021-10-19 RX ADMIN — Medication 200 MILLIGRAM(S): at 13:54

## 2021-10-19 RX ADMIN — Medication 200 MILLIGRAM(S): at 06:06

## 2021-10-19 RX ADMIN — Medication 200 MILLIGRAM(S): at 21:08

## 2021-10-19 RX ADMIN — Medication 650 MILLIGRAM(S): at 06:45

## 2021-10-19 RX ADMIN — CLOPIDOGREL BISULFATE 75 MILLIGRAM(S): 75 TABLET, FILM COATED ORAL at 11:19

## 2021-10-19 RX ADMIN — OXYCODONE AND ACETAMINOPHEN 2 TABLET(S): 5; 325 TABLET ORAL at 13:54

## 2021-10-19 RX ADMIN — CLOBAZAM 20 MILLIGRAM(S): 10 TABLET ORAL at 21:09

## 2021-10-19 RX ADMIN — CLOBAZAM 20 MILLIGRAM(S): 10 TABLET ORAL at 09:31

## 2021-10-19 RX ADMIN — Medication 100 MILLIGRAM(S): at 03:25

## 2021-10-19 RX ADMIN — OXYCODONE AND ACETAMINOPHEN 2 TABLET(S): 5; 325 TABLET ORAL at 16:25

## 2021-10-19 NOTE — PROGRESS NOTE ADULT - PROBLEM SELECTOR PLAN 1
RFV with sutures to be removed 10/19 @ 1300 (24 hours post procedure)  LFV manual compression held  BL groin sites stable no evidence of hematoma-  bedrest       Plavix only, patient had ASA allergy  patient has documented PCN allergy- discussed with Dr Mendieta, not true allergy, was given Zinacef in the OR.   Cont with Zinacef x 2 doses Q8 hours, MONITOR FOR ALLERGIC REACTION
RFV with sutures to be removed 10/19 @ 1300 (24 hours post procedure)  LFV manual compression held  BL groin sites stable no evidence of hematoma-  bedrest       Plavix only, patient had ASA allergy  patient has documented PCN allergy- discussed with Dr Mendieta, not true allergy, was given Zinacef in the OR.   Cont with Zinacef x 2 doses Q8 hours, MONITOR FOR ALLERGIC REACTION

## 2021-10-19 NOTE — PROGRESS NOTE ADULT - SUBJECTIVE AND OBJECTIVE BOX
Structural Heart Team    denies chest pain/pressure, sob and dizziness    HPI:  65 year old female with extensive PMH of non-obstructive CAD, Former smoker (0.5 PPD x 30 years),  HLD, Known Mitral Regurgitation x 3 years, PVCs, Current everyday smoker (1/[id number]), COPD, DJD, Chronic Migraines, Osteoporosis, Lt Breast Stage 1 CA Dx in 1998 treated with CMF, LT lumpectomy x 2 and radiation/chemo (started tamoxifen [id number], then Femara [id number]), Seizure disorder since childhood (Grandmal/Petit/Psychomotor with seizures weekly to monthly) from Scarlet Fever, Brain surgery for epilepsy in 1980 reports c/o intermittent left-sided aching chest pain x 5 minutes (pain scale 5/10) that subsides on its own with occasional radiation to left arm x 2 years. She can walk up to 2-4 miles with a cane and climb 2 flights of stairs without SOB. Her mitral regurgitation is being monitored by her cardiologist with TTE annually and recent TTE revealed severe mitral regurgitation. She denies any SOB, palpitations, dizziness, syncope or pedal edema. Pt now presents to [facility] for scheduled mitral clip surgery with Dr. Mendieta on 10/18/21.    Covid PCR test obtained 10/15/21 at [facility]  Covid vaccine Pfizer 2nd dose received 4/1/2021  Denies Recent travel, Exposure or Covid symptoms  (15 Oct 2021 14:12)      REVIEW OF SYSTEMS:    CONSTITUTIONAL: No weakness, fevers or chills  EYES/ENT: No visual changes;  No vertigo or throat pain   NECK: No pain or stiffness  RESPIRATORY: No cough, wheezing, hemoptysis; No shortness of breath  CARDIOVASCULAR: No chest pain or palpitations  GASTROINTESTINAL: No abdominal or epigastric pain. No nausea, vomiting, or hematemesis; No diarrhea or constipation. No melena or hematochezia.  GENITOURINARY: No dysuria, frequency or hematuria  NEUROLOGICAL: No numbness or weakness  SKIN: No itching, rashes      Allergies    aspirin (Unknown)  Botox (Other)  OxyCODONE Hydrochloride (Rash)  penicillin (Rash)    Intolerances      Vital Signs Last 24 Hrs  T(C): 36.7 (19 Oct 2021 12:55), Max: 36.9 (18 Oct 2021 17:41)  T(F): 98.1 (19 Oct 2021 12:55), Max: 98.5 (18 Oct 2021 17:41)  HR: 91 (19 Oct 2021 12:55) (73 - 91)  BP: 110/70 (19 Oct 2021 12:55) (101/65 - 152/79)  BP(mean): 84 (19 Oct 2021 12:55) (84 - 84)  RR: 18 (19 Oct 2021 12:55) (18 - 19)  SpO2: 97% (19 Oct 2021 12:55) (94% - 97%)    MEDICATIONS  (STANDING):  carBAMazepine 200 milliGRAM(s) Oral every 8 hours  chlorhexidine 2% Cloths 1 Application(s) Topical once  cloBAZam 20 milliGRAM(s) Oral two times a day  clopidogrel Tablet 75 milliGRAM(s) Oral daily  influenza   Vaccine 0.5 milliLiter(s) IntraMuscular once      Exam-  General: NAD, WDWN, appropriate affect  Cor: s1s2, RR,   EKG/Tele:   Pulm: Clear, no wheezes, rales, or rhonchi, no use of accessory muscles  Gastrointestinal: soft, nontender, nondistended, +bowel sounds  Extremities:   Groin: dressings intact, nontender, clean and dry, soft, no hematoma b/l  Neuro: A&Ox3, nonfocal                          11.4   8.47  )-----------( 181      ( 19 Oct 2021 05:32 )             35.5   10-19    139  |  105  |  11  ----------------------------<  102<H>  3.8   |  23  |  0.59    Ca    8.7      19 Oct 2021 05:34    TPro  6.4  /  Alb  3.7  /  TBili  0.2  /  DBili  x   /  AST  12  /  ALT  7<L>  /  AlkPhos  86  10-19  PT/INR - ( 19 Oct 2021 05:32 )   PT: 11.8 sec;   INR: 0.98 ratio         PTT - ( 18 Oct 2021 09:15 )  PTT:29.5 sec  I&O's Summary    18 Oct 2021 07:01  -  19 Oct 2021 07:00  --------------------------------------------------------  IN: 0 mL / OUT: 900 mL / NET: -900 mL    19 Oct 2021 07:01  -  19 Oct 2021 13:46  --------------------------------------------------------  IN: 480 mL / OUT: 300 mL / NET: 180 mL              Assessment/Plan:        - Discharge plan: follow up with Dr. Mendieta in one week and follow up with Structural Heart Team in one month.  Echo will be done at 1 month follow up visit.  Plan discussed with patient     MARA Lugo  [phone]     Structural Heart Team    Ms Tello says she had some back pain last night which resolved.  She otherwise feels well and denies chest pain/pressure, sob and dizziness as well as groin pain.  There were no acute events overnight.       REVIEW OF SYSTEMS:    CONSTITUTIONAL: No weakness, fevers or chills  EYES/ENT: No visual changes;  No vertigo or throat pain   NECK: No pain or stiffness  RESPIRATORY: No cough, wheezing, hemoptysis; No shortness of breath  CARDIOVASCULAR: No chest pain or palpitations  GASTROINTESTINAL: No abdominal or epigastric pain. No nausea, vomiting, or hematemesis; No diarrhea or constipation. No melena or hematochezia.  GENITOURINARY: No dysuria, frequency or hematuria  NEUROLOGICAL: No numbness or weakness  SKIN: No itching, rashes      Allergies    aspirin (Unknown)  Botox (Other)  OxyCODONE Hydrochloride (Rash)  penicillin (Rash)    Intolerances      Vital Signs Last 24 Hrs  T(C): 36.7 (19 Oct 2021 12:55), Max: 36.9 (18 Oct 2021 17:41)  T(F): 98.1 (19 Oct 2021 12:55), Max: 98.5 (18 Oct 2021 17:41)  HR: 91 (19 Oct 2021 12:55) (73 - 91)  BP: 110/70 (19 Oct 2021 12:55) (101/65 - 152/79)  BP(mean): 84 (19 Oct 2021 12:55) (84 - 84)  RR: 18 (19 Oct 2021 12:55) (18 - 19)  SpO2: 97% (19 Oct 2021 12:55) (94% - 97%)    MEDICATIONS  (STANDING):  carBAMazepine 200 milliGRAM(s) Oral every 8 hours  chlorhexidine 2% Cloths 1 Application(s) Topical once  cloBAZam 20 milliGRAM(s) Oral two times a day  clopidogrel Tablet 75 milliGRAM(s) Oral daily  influenza   Vaccine 0.5 milliLiter(s) IntraMuscular once      Exam-  General: NAD, WDWN, appropriate affect  Cor: s1s2, RRR, no murmurs, rubs or gallops   Tele: SR 70-80 PVCs   Pulm: Clear, no wheezes, rales, or rhonchi, no use of accessory muscles  Gastrointestinal: soft, nontender, nondistended, +bowel sounds  Extremities: no edema, 1+ DP pulses  Groin: dressings intact, nontender, clean and dry, soft, no hematoma b/l  Neuro: A&Ox3, nonfocal                          11.4   8.47  )-----------( 181      ( 19 Oct 2021 05:32 )             35.5   10-19    139  |  105  |  11  ----------------------------<  102<H>  3.8   |  23  |  0.59    Ca    8.7      19 Oct 2021 05:34    TPro  6.4  /  Alb  3.7  /  TBili  0.2  /  DBili  x   /  AST  12  /  ALT  7<L>  /  AlkPhos  86  10-19  PT/INR - ( 19 Oct 2021 05:32 )   PT: 11.8 sec;   INR: 0.98 ratio         PTT - ( 18 Oct 2021 09:15 )  PTT:29.5 sec  I&O's Summary    18 Oct 2021 07:01  -  19 Oct 2021 07:00  --------------------------------------------------------  IN: 0 mL / OUT: 900 mL / NET: -900 mL    19 Oct 2021 07:01  -  19 Oct 2021 13:46  --------------------------------------------------------  IN: 480 mL / OUT: 300 mL / NET: 180 mL      < from: Transthoracic Echocardiogram (10.19.21 @ 07:35) >  Dimensions:    Normal Values:  LA:     2.9    2.0 - 4.0 cm  Ao:     3.2    2.0 - 3.8 cm  SEPTUM: 1.0    0.6 - 1.2 cm  PWT:    0.7    0.6 - 1.1 cm  LVIDd:  4.5    3.0 - 5.6 cm  LVIDs:  3.1    1.8 - 4.0 cm  Derived variables:  LVMI: 84 g/m2  RWT: 0.31  EF (Visual Estimate): 65 %  Doppler Peak Velocity (m/sec): MV=1.6 AoV=1.2 TV=2.2  ------------------------------------------------------------------------  Observations:  Mitral Valve: Mitral RAND is seen. Calcified mitral  chordae.  Mean mitral gradient 3.6 mmHg at  81/min. Mild mitral  regurgitation.  Aortic Valve/Aorta: Calcified aortic valve with normal  opening. Mild aortic regurgitation.  Normal aortic root size.  Left Atrium: Normal left atrium.  Left Ventricle: Normal left ventricular internal dimensions  and wall thicknesses.  Normal left ventricular systolic function. No segmental  wall motion abnormalities.  Right Heart: Normal right atrium. Normal right ventricular  size and function.  Normal tricuspid valve. Mild tricuspid regurgitation.  Normal pulmonic valve. Mild pulmonic regurgitation.  Pericardium/Pleura: Normal pericardium with no pericardial  effusion.  Hemodynamic: Estimated right atrial pressure is normal.  No evidence of pulmonary hypertension.  ------------------------------------------------------------------------  Conclusions:  Normal left ventricular systolic function. No segmental  wall motion abnormalities.  Mitral RAND is present, with normal gradient. Mild mitral  regurgitation.    < end of copied text >          Assessment/Plan:  Ms Tello is POD 1 s/p mitral RAND (DAGO) as part of the [facility] clinical trial for severe MR  - TTE done and reviewed  - stitch removed  - Plavix  - resume preop meds  - ambulate as tolerated  - likely d/c home tomorrow    - Discharge plan: follow up with Dr. Mendieta in one week and follow up with Structural Heart Team in one month.  Echo will be done at 1 month follow up visit.  Plan discussed with patient     Toney Arshad, PA  [phone]
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
## 2021-10-19 NOTE — PROGRESS NOTE ADULT - SUBJECTIVE AND OBJECTIVE BOX
VITAL SIGNS    Telemetry:  nsr 77    Vital Signs Last 24 Hrs  T(C): 36.8 (10-19-21 @ 04:04), Max: 36.9 (10-18-21 @ 17:41)  T(F): 98.3 (10-19-21 @ 04:04), Max: 98.5 (10-18-21 @ 17:41)  HR: 82 (10-19-21 @ 04:04) (70 - 91)  BP: 101/65 (10-19-21 @ 04:04) (101/65 - 152/79)  RR: 18 (10-19-21 @ 04:04) (14 - 19)  SpO2: 95% (10-19-21 @ 04:04) (94% - 98%)                   10-18 @ 07:01  -  10-19 @ 07:00  --------------------------------------------------------  IN: 0 mL / OUT: 900 mL / NET: -900 mL    10-19 @ 07:01  -  10-19 @ 10:02  --------------------------------------------------------  IN: 240 mL / OUT: 0 mL / NET: 240 mL          Daily     Daily Weight in k.7 (19 Oct 2021 07:17)            CAPILLARY BLOOD GLUCOSE                        Coumadin    [ ] YES          [x  ]      NO                                   PHYSICAL EXAM        Neurology: alert and oriented x 3, nonfocal, no gross deficits  CV : s1 s2 RRR  Sternal Wound :  CDI , Stable  Lungs: cta  Abdomen: soft, nontender, nondistended, positive bowel sounds, last bowel movement                        :    voiding   Extremities:     - edema   /  -   calve tenderness ,    R groin cdi, stitch.  L groin cdi          carBAMazepine 200 milliGRAM(s) Oral every 8 hours  chlorhexidine 2% Cloths 1 Application(s) Topical once  cloBAZam 20 milliGRAM(s) Oral two times a day  clopidogrel Tablet 75 milliGRAM(s) Oral daily  influenza   Vaccine 0.5 milliLiter(s) IntraMuscular once  oxycodone    5 mG/acetaminophen 325 mG 2 Tablet(s) Oral every 6 hours PRN                    Physical Therapy Rec:   Home  [  ]   Home w/ PT  [  ]  Rehab  [  ]  Discussed with Cardiothoracic Team at AM rounds.

## 2021-10-19 NOTE — PROGRESS NOTE ADULT - PROBLEM SELECTOR PROBLEM 1
GCS 3 s/p cardiac arrest. Suspected Anoxic Brain Injury. Poor Prognosis.
RN called for hypertension to 200/80s.
S/P mitral valve clip implantation
S/P mitral valve clip implantation

## 2021-10-20 ENCOUNTER — TRANSCRIPTION ENCOUNTER (OUTPATIENT)
Age: 66
End: 2021-10-20

## 2021-10-20 VITALS
HEART RATE: 86 BPM | RESPIRATION RATE: 18 BRPM | SYSTOLIC BLOOD PRESSURE: 110 MMHG | TEMPERATURE: 98 F | DIASTOLIC BLOOD PRESSURE: 73 MMHG | OXYGEN SATURATION: 97 %

## 2021-10-20 LAB
ALBUMIN SERPL ELPH-MCNC: 3.8 G/DL — SIGNIFICANT CHANGE UP (ref 3.3–5)
ALP SERPL-CCNC: 82 U/L — SIGNIFICANT CHANGE UP (ref 40–120)
ALT FLD-CCNC: 8 U/L — LOW (ref 10–45)
ANION GAP SERPL CALC-SCNC: 11 MMOL/L — SIGNIFICANT CHANGE UP (ref 5–17)
AST SERPL-CCNC: 12 U/L — SIGNIFICANT CHANGE UP (ref 10–40)
BILIRUB SERPL-MCNC: 0.3 MG/DL — SIGNIFICANT CHANGE UP (ref 0.2–1.2)
BUN SERPL-MCNC: 12 MG/DL — SIGNIFICANT CHANGE UP (ref 7–23)
CALCIUM SERPL-MCNC: 8.7 MG/DL — SIGNIFICANT CHANGE UP (ref 8.4–10.5)
CHLORIDE SERPL-SCNC: 105 MMOL/L — SIGNIFICANT CHANGE UP (ref 96–108)
CK MB CFR SERPL CALC: 1.9 NG/ML — SIGNIFICANT CHANGE UP (ref 0–3.8)
CK SERPL-CCNC: 45 U/L — SIGNIFICANT CHANGE UP (ref 25–170)
CO2 SERPL-SCNC: 24 MMOL/L — SIGNIFICANT CHANGE UP (ref 22–31)
CREAT SERPL-MCNC: 0.63 MG/DL — SIGNIFICANT CHANGE UP (ref 0.5–1.3)
GLUCOSE SERPL-MCNC: 108 MG/DL — HIGH (ref 70–99)
HCT VFR BLD CALC: 33.3 % — LOW (ref 34.5–45)
HGB BLD-MCNC: 10.9 G/DL — LOW (ref 11.5–15.5)
INR BLD: 0.96 RATIO — SIGNIFICANT CHANGE UP (ref 0.88–1.16)
MCHC RBC-ENTMCNC: 32.6 PG — SIGNIFICANT CHANGE UP (ref 27–34)
MCHC RBC-ENTMCNC: 32.7 GM/DL — SIGNIFICANT CHANGE UP (ref 32–36)
MCV RBC AUTO: 99.7 FL — SIGNIFICANT CHANGE UP (ref 80–100)
NRBC # BLD: 0 /100 WBCS — SIGNIFICANT CHANGE UP (ref 0–0)
PLATELET # BLD AUTO: 161 K/UL — SIGNIFICANT CHANGE UP (ref 150–400)
POTASSIUM SERPL-MCNC: 3.8 MMOL/L — SIGNIFICANT CHANGE UP (ref 3.5–5.3)
POTASSIUM SERPL-SCNC: 3.8 MMOL/L — SIGNIFICANT CHANGE UP (ref 3.5–5.3)
PROT SERPL-MCNC: 6.4 G/DL — SIGNIFICANT CHANGE UP (ref 6–8.3)
PROTHROM AB SERPL-ACNC: 11.5 SEC — SIGNIFICANT CHANGE UP (ref 10.6–13.6)
RBC # BLD: 3.34 M/UL — LOW (ref 3.8–5.2)
RBC # FLD: 12.7 % — SIGNIFICANT CHANGE UP (ref 10.3–14.5)
SODIUM SERPL-SCNC: 140 MMOL/L — SIGNIFICANT CHANGE UP (ref 135–145)
WBC # BLD: 6.6 K/UL — SIGNIFICANT CHANGE UP (ref 3.8–10.5)
WBC # FLD AUTO: 6.6 K/UL — SIGNIFICANT CHANGE UP (ref 3.8–10.5)

## 2021-10-20 PROCEDURE — 93010 ELECTROCARDIOGRAM REPORT: CPT

## 2021-10-20 RX ORDER — CLOPIDOGREL BISULFATE 75 MG/1
1 TABLET, FILM COATED ORAL
Qty: 30 | Refills: 0
Start: 2021-10-20 | End: 2021-11-18

## 2021-10-20 RX ADMIN — CLOPIDOGREL BISULFATE 75 MILLIGRAM(S): 75 TABLET, FILM COATED ORAL at 12:28

## 2021-10-20 RX ADMIN — Medication 200 MILLIGRAM(S): at 05:38

## 2021-10-20 RX ADMIN — CLOBAZAM 20 MILLIGRAM(S): 10 TABLET ORAL at 05:37

## 2021-10-20 NOTE — DISCHARGE NOTE PROVIDER - NSDCMRMEDTOKEN_GEN_ALL_CORE_FT
Calcium 600+D Plus Minerals oral tablet, chewable: 1 tab(s) orally once a day  carBAMazepine 200 mg oral tablet: 1 tab(s) orally 3 times a day  cloBAZam 20 mg oral film: 2 each orally 2 times a day  clopidogrel 75 mg oral tablet: 1 tab(s) orally once a day  hydrocodone-acetaminophen 10 mg-325 mg oral tablet: 1 tab(s) orally every 6 hours  multivitamin:   rosuvastatin 5 mg oral tablet: 1 tab(s) orally once a day  Vitamin D2 50,000 intl units (1.25 mg) oral capsule: 1 cap(s) orally once a week (Fridays)

## 2021-10-20 NOTE — DISCHARGE NOTE PROVIDER - CARE PROVIDER_API CALL
Leonidas Mendieta)  Interventional Cardiology  [address] [place] [address]  Phone: [phone]  Fax: [phone]  Scheduled Appointment: 10/05/2021 09:00 AM    Artis Jesus)  Cardiovascular Disease; Internal Medicine  [address] [place] [address], [place] [address]  Phone: [phone]  Fax: [phone]  Follow Up Time:

## 2021-10-20 NOTE — DISCHARGE NOTE PROVIDER - NSDCFUSCHEDAPPT_GEN_ALL_CORE_FT
ANTIONETTE GONZALEZ ; 12/04/2021 ; NPP Med Int [address] ANTIONETTE Johnson ; 12/14/2021 ; NPP Surgonc [address] [place] [address]  ANTIONETTE GONZALEZ ; 12/20/2021 ; NPP Neuro [address] [place] [address]

## 2021-10-20 NOTE — DISCHARGE NOTE NURSING/CASE MANAGEMENT/SOCIAL WORK - NSDCVIVACCINE_GEN_ALL_CORE_FT
Tdap; 13-Jun-2019 16:37; Jayna Eddy (SHAVON); [employer]; [id number] (Exp. Date: 21-May-2021); IntraMuscular; Deltoid Left.; 0.5 milliLiter(s); VIS (VIS Published: 09-May-2013, VIS Presented: 13-Jun-2019);

## 2021-10-20 NOTE — DISCHARGE NOTE PROVIDER - PROVIDER TOKENS
PROVIDER:[TOKEN:[[id number]],SCHEDULEDAPPT:[10/05/2021],SCHEDULEDAPPTTIME:[09:00 AM]],PROVIDER:[TOKEN:[[id number]]]
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
## 2021-10-20 NOTE — DISCHARGE NOTE PROVIDER - NSDCFUADDAPPT_GEN_ALL_CORE_FT
YOU HAVE AN APPOINTMENT WITH DR CRUZ 10/25/21, MONDAY AT 9AM    CALL DR BURK AND SEE HIM IN 2-3 WEEKS

## 2021-10-20 NOTE — DISCHARGE NOTE NURSING/CASE MANAGEMENT/SOCIAL WORK - PATIENT PORTAL LINK FT
You can access the FollowMyHealth Patient Portal offered by [facility] by registering at the following website: [email or web address]. By joining [facility] [employer]’s FollowMyHealth portal, you will also be able to view your health information using other applications (apps) compatible with our system.

## 2021-10-20 NOTE — DISCHARGE NOTE PROVIDER - NSDCPNSUBOBJ_GEN_ALL_CORE
VITAL SIGNS    Telemetry:  NSR 70    Vital Signs Last 24 Hrs  T(C): 36.8 (10-20-21 @ 04:45), Max: 37.1 (10-19-21 @ 19:05)  T(F): 98.3 (10-20-21 @ 04:45), Max: 98.7 (10-19-21 @ 19:05)  HR: 72 (10-20-21 @ 04:45) (72 - 91)  BP: 111/69 (10-20-21 @ 04:45) (101/65 - 111/69)  RR: 18 (10-20-21 @ 04:45) (18 - 18)  SpO2: 97% (10-20-21 @ 04:45) (97% - 97%)                   10-19 @ 07:01  -  10-20 @ 07:00  --------------------------------------------------------  IN: 965 mL / OUT: 1050 mL / NET: -85 mL    10-20 @ 07:01  -  10-20 @ 09:18  --------------------------------------------------------  IN: 240 mL / OUT: 0 mL / NET: 240 mL          Daily     Daily Weight in k.7 (20 Oct 2021 07:10)            CAPILLARY BLOOD GLUCOSE             Coumadin    [ ] YES          [X  ]      NO                                   PHYSICAL EXAM        Neurology: alert and oriented x 3, nonfocal, no gross deficits  CV : s1 s2 RRR    Lungs: cta  Abdomen: soft, nontender, nondistended, positive bowel sounds                  :    voiding    Extremities:   -   edema   /  -   calve tenderness ,    r GROIN CDI          carBAMazepine 200 milliGRAM(s) Oral every 8 hours  chlorhexidine 2% Cloths 1 Application(s) Topical once  cloBAZam 20 milliGRAM(s) Oral two times a day  clopidogrel Tablet 75 milliGRAM(s) Oral daily  influenza   Vaccine 0.5 milliLiter(s) IntraMuscular once  oxycodone    5 mG/acetaminophen 325 mG 2 Tablet(s) Oral every 6 hours PRN                  Discussed with Cardiothoracic Team at AM rounds.

## 2021-10-21 ENCOUNTER — NON-APPOINTMENT (OUTPATIENT)
Age: 66
End: 2021-10-21

## 2021-10-25 ENCOUNTER — APPOINTMENT (OUTPATIENT)
Dept: CARDIOTHORACIC SURGERY | Facility: CLINIC | Age: 66
End: 2021-10-25
Payer: MEDICARE

## 2021-10-25 VITALS
HEIGHT: 64 IN | RESPIRATION RATE: 16 BRPM | WEIGHT: 99 LBS | SYSTOLIC BLOOD PRESSURE: 115 MMHG | HEART RATE: 83 BPM | DIASTOLIC BLOOD PRESSURE: 77 MMHG | OXYGEN SATURATION: 99 % | BODY MASS INDEX: 16.9 KG/M2

## 2021-10-25 VITALS — SYSTOLIC BLOOD PRESSURE: 119 MMHG | DIASTOLIC BLOOD PRESSURE: 76 MMHG

## 2021-10-25 DIAGNOSIS — I34.0 NONRHEUMATIC MITRAL (VALVE) INSUFFICIENCY: ICD-10-CM

## 2021-10-25 PROCEDURE — 99024 POSTOP FOLLOW-UP VISIT: CPT
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
## 2021-10-25 NOTE — DISCUSSION/SUMMARY
[FreeTextEntry1] : Plan:\par 1)  Stable 1 week s/p MitraClip. Right groin is healing well.\par 2) BP at goal - Medication changes: None. \par 3) Fluid status: Euvolemic.\par 4) To follow-up regularly with cardiology as scheduled (Dr. Artis Jesus-has appt. next week)\par 5) To follow-up regularly with PCP as scheduled (Dr. Karen Blanc)\par 6) To follow-up with structural heart clinic with TTE for 30-day TTE and office visit or sooner if needed.\par 7) Antibiotic prophylaxis discussed\par 8) MRI safety: she can be safely scanned in an MR system meeting the following conditions:\par • Static magnetic field of 1.5 or 3 Sade\par • Maximum spatial field gradient of 2500 Gauss / cm\par • Maximum MR system reported, whole body averaged specific absorption rate (APRYL) of 4 W / kg (First Level Controlled Operating Mode).  \par Under the scan conditions defined above, MitraClip™ NT is expected to produce a maximum temperature rise of less than 3°C after 15 minutes of continuous scanning. In non-clinical testing, the image artifact caused by a pair of devices extends approximately 30 mm beyond MitraClip™ NT when imaged with a spin echo or gradient echo pulse sequence and a 3 T magnetic resonance system. It may be necessary to optimize the magnetic resonance imaging parameters due to the presence of the implant.
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
## 2021-10-25 NOTE — REVIEW OF SYSTEMS
[Negative] : Heme/Lymph [Fever] : no fever [Headache] : no headache [Chills] : no chills [Feeling Fatigued] : not feeling fatigued [SOB] : no shortness of breath [Dyspnea on exertion] : not dyspnea during exertion [Chest Discomfort] : no chest discomfort [Lower Ext Edema] : no extremity edema [Leg Claudication] : no intermittent leg claudication [Palpitations] : no palpitations [Syncope] : no syncope [Cough] : no cough [Change in Appetite] : no change in appetite [Joint Pain] : joint pain [Joint Stiffness] : joint stiffness [Dizziness] : no dizziness [Convulsions] : convulsions

## 2021-10-26 PROCEDURE — 85610 PROTHROMBIN TIME: CPT

## 2021-10-26 PROCEDURE — 85730 THROMBOPLASTIN TIME PARTIAL: CPT

## 2021-10-26 PROCEDURE — 93306 TTE W/DOPPLER COMPLETE: CPT

## 2021-10-26 PROCEDURE — 85027 COMPLETE CBC AUTOMATED: CPT

## 2021-10-26 PROCEDURE — C9399: CPT

## 2021-10-26 PROCEDURE — 36415 COLL VENOUS BLD VENIPUNCTURE: CPT

## 2021-10-26 PROCEDURE — 93355 ECHO TRANSESOPHAGEAL (TEE): CPT

## 2021-10-26 PROCEDURE — 80053 COMPREHEN METABOLIC PANEL: CPT

## 2021-10-26 PROCEDURE — C1769: CPT

## 2021-10-26 PROCEDURE — 80048 BASIC METABOLIC PNL TOTAL CA: CPT

## 2021-10-26 PROCEDURE — 82550 ASSAY OF CK (CPK): CPT

## 2021-10-26 PROCEDURE — 93005 ELECTROCARDIOGRAM TRACING: CPT

## 2021-10-26 PROCEDURE — 85025 COMPLETE CBC W/AUTO DIFF WBC: CPT

## 2021-10-26 PROCEDURE — 76000 FLUOROSCOPY <1 HR PHYS/QHP: CPT

## 2021-10-26 PROCEDURE — 82553 CREATINE MB FRACTION: CPT

## 2021-10-26 PROCEDURE — 86769 SARS-COV-2 COVID-19 ANTIBODY: CPT

## 2021-10-26 PROCEDURE — C1894: CPT

## 2021-10-26 PROCEDURE — 82962 GLUCOSE BLOOD TEST: CPT

## 2021-11-02 ENCOUNTER — APPOINTMENT (OUTPATIENT)
Dept: CARDIOLOGY | Facility: CLINIC | Age: 66
End: 2021-11-02
Payer: MEDICARE

## 2021-11-02 VITALS — DIASTOLIC BLOOD PRESSURE: 76 MMHG | SYSTOLIC BLOOD PRESSURE: 139 MMHG | HEART RATE: 92 BPM

## 2021-11-02 VITALS — HEART RATE: 85 BPM | DIASTOLIC BLOOD PRESSURE: 96 MMHG | SYSTOLIC BLOOD PRESSURE: 144 MMHG

## 2021-11-02 VITALS
HEART RATE: 93 BPM | SYSTOLIC BLOOD PRESSURE: 113 MMHG | HEIGHT: 64 IN | WEIGHT: 99 LBS | BODY MASS INDEX: 16.9 KG/M2 | DIASTOLIC BLOOD PRESSURE: 67 MMHG

## 2021-11-02 PROCEDURE — 99407 BEHAV CHNG SMOKING > 10 MIN: CPT | Mod: 95,25

## 2021-11-02 PROCEDURE — 99215 OFFICE O/P EST HI 40 MIN: CPT | Mod: 95,25
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
## 2021-11-02 NOTE — REVIEW OF SYSTEMS
[Feeling Fatigued] : feeling fatigued [Under Stress] : under stress [Negative] : Heme/Lymph [Weight Gain (___ Lbs)] : [unfilled] ~Ulb weight gain [Headache] : no headache [Weight Loss (___ Lbs)] : no recent weight loss [de-identified] : see HPI [de-identified] : see HPI

## 2021-11-02 NOTE — COUNSELING
[Risk of tobacco use and health benefits of smoking cessation discussed] : Risk of tobacco use and health benefits of smoking cessation discussed [Cessation strategies including cessation program discussed] : Cessation strategies including cessation program discussed [Use of nicotine replacement therapies and other medications discussed] : Use of nicotine replacement therapies and other medications discussed [Encouraged to pick a quit date and identify support needed to quit] : Encouraged to pick a quit date and identify support needed to quit [Tobacco Use Cessation Intensive Greater Than 10 Minutes] : Tobacco Use Cessation Intensive Greater Than 10 Minutes [Willing to Quit Smoking] : Not willing to quit smoking [FreeTextEntry3] : 15

## 2021-11-02 NOTE — CARDIOLOGY SUMMARY
[de-identified] : 10/20/21, normal sinus rhythm at 76 bpm with abnormal T-waves in the inferior and precordial leads;  ms / QTc 465 mds. Prior ECG 6/1/21 showed normal sinus rhythm at 62 bpm with T-wave abnormalities in leads II, III, aVF, V3-V6.  ms / QTc 422 ms.   [de-identified] : 6/18/21, small moderate anteroapical and distal inferior defects that are fixed, consistent with infarct. No segmental wall motion abnormality; LVEF 61%. There was no significant change compared to prior study 3/7/17. [de-identified] : 10/19/21, mitral RAND is seen with calcified mitral chordae. Mean mitral gradient 3.6 mm Hg at 81 bpm. There was mild mitral regurgitation. The aortic valve appeared calcified with normal opening and mild regurgitation. The LV was normal in size and wall thickness. There were no segmental wall motion abnormalities and overall systolic function was normal; LVEF 65%. The RA appeared normal. The RV appeared normal in size and function. The tricuspid and pulmonic valves appeared normal, both with mild regurgitation. Estimated RA pressure normal. There was no evidence of pericardial effusion [de-identified] : RHC / LHC: 6/2/21, Ostial LM 30 % stenosis. pLAD 60% stenosis in the distal third of the vessel segment, at the origin of D1. dLCX 50% stenosis in the proximal third of the vessel segment. p RCA 60 % stenosis. mRCA 60 % stenosis

## 2021-11-02 NOTE — REASON FOR VISIT
[Structural Heart and Valve Disease] : structural heart and valve disease [Follow-Up - Clinic] : a clinic follow-up of [Cardiomyopathy] : cardiomyopathy [Coronary Artery Disease] : coronary artery disease [Hyperlipidemia] : hyperlipidemia [Mitral Regurgitation] : mitral regurgitation [Discharge Date: ___] : Discharge Date: [unfilled] [Admitted for Heart Failure] : patient was not admitted for heart failure [FreeTextEntry1] : isolated elevated BP
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
## 2021-11-02 NOTE — ADDENDUM
[FreeTextEntry1] : I spent 60 minutes face to face time with the patient via telemedicine (diandra Santana) from 11:00 to 12:00 on 9/30/21. Thirty minutes were devoted to patient counseling with emphasis on maintenance smoking cessation (see Narrative section). Informed consent for telemedicine was obtained prior to the visit. Prior to this visit, I reviewed [facility] hospital records (d/c 10/20/21)

## 2021-11-02 NOTE — DISCUSSION/SUMMARY
[FreeTextEntry1] : \par SMOKING CESSATION:\par We all know the health risks of smoking, and most of us know that kicking the habit is the single biggest improvement to health a smoker can make. But that doesn’t make it any easier to kick the habit. Whether you’re a teen smoker or a lifetime pack-a-day smoker, quitting can be tough.\par To increase your chances of success, you need to be motivated, have social support, an understanding of what to expect, and a personal game plan. It is possible to learn how to replace your smoking habits, manage your cravings, and join the millions of people who have kicked the habit for good.\par Smoking tobacco is both a psychological habit and a physical addiction. The act of smoking is ingrained as a daily ritual and, at the same time, the nicotine from cigarettes provides a temporary, and addictive, high. Eliminating that regular fix of nicotine will cause your body to experience physical withdrawal symptoms and cravings. To successfully quit smoking, you’ll need to address both the habit and the addiction by changing your behavior and dealing with nicotine withdrawal symptoms.\par Relieving unpleasant and overwhelming feelings without cigarettes\par Managing unpleasant feelings such as stress, depression, loneliness, fear, and anxiety are some of the most common reasons why adults smoke. When you have a bad day, it can seem like your cigarettes are your only friend. Smoking can temporarily make feelings such as sadness, stress, anxiety, depression, and boredom evaporate into thin air. As much comfort as cigarettes provide, though, it’s important to remember that there are healthier (and more effective) ways to keep unpleasant feelings in check. These may include exercising, meditating, using sensory relaxation strategies, and practicing simple breathing exercises. \par For many people, an important aspect of quitting smoking is to find alternate ways to handle these difficult feelings without smoking. Even when cigarettes are no longer a part of your life, the painful and unpleasant feelings that may have prompted you to smoke in the past will still remain. So, it’s worth spending some time thinking about the different ways you intend to deal with stressful situations and the daily irritations that would normally have you reaching for a cigarette.\par Tailoring a personal game plan to your specific needs and desires can be a big help. List the reasons why you want to quit and then keep copies of the list in the places where you’d normally keep your cigarettes, such as in your jacket, purse, or car. Your reasons for quitting smoking might include:\par I will feel healthier and have more energy, whiter teeth and fresher breath.\par I will lower my risk for cancer, heart attacks, strokes, early death, cataracts, and skin wrinkling.\par I will make myself and my partner, friends, and family proud of me.\par I will no longer expose my children and others to the dangers of my second-hand smoke.\par I will have a healthier baby (If you or your partner is pregnant).\par I will have more money to spend.\par I won't have to worry: "When will I get to smoke next?"\par Questions to ask yourself\par To successfully detach from smoking, you will need to identify and address your smoking habits, the true nature of your dependency, and the techniques that work for you. These types of questions can help:\par Do you feel the need to smoke at every meal?\par Are you more of a social smoker?\par Is it a very bad addiction (more than a pack a day)? Or would a simple nicotine patch do the job?\par Is your cigarette smoking linked to other addictions, such as alcohol or gambling?\par Are you open to hypnotherapy and/or acupuncture?\par Are you someone who is open to talking about your addiction with a therapist or counselor?\par Are you interested in getting into a fitness program?\par Take the time to think of what kind of smoker you are, which moments of your life call for a cigarette, and why. This will help you to identify which tips, techniques or therapies may be most beneficial for you. \par Start your stop smoking plan with START\par S = Set a quit date.\par T = Tell family, friends, and co-workers that you plan to quit.\par A = Anticipate and plan for the challenges you'll face while quitting.\par R = Remove cigarettes and other tobacco products from your home, car, and work.\par T = Talk to your doctor about getting help to quit.\par \par After quitting, you may feel dizzy, restless, or even have strong headaches because you’re lacking the immediate release of sugar that comes from nicotine. You may also have a bigger appetite. These sugar-related cravings should only last a few days until your body adjusts so keep your sugar levels a bit higher than usual on those days by drinking plenty of juice (unless you’re a diabetic). It will help prevent the craving symptoms and help your body re-adjust back to normal.\par Tips for managing other cigarette cravings\par Cravings associated with meals\par For some smokers, ending a meal means lighting up, and the prospect of giving that up may appear daunting. TIP: replace that moment after a meal with something such as a piece of fruit, a (healthy) dessert, a square of chocolate, or a stick of gum.\par \par Alcohol and cigarettes\par Many people have a habit of smoking when they have an alcoholic drink. TIP: try non-alcoholic drinks, or try drinking only in bars, restaurant-bars, or friends’ houses where smoking inside is prohibited. Or try snacking on nuts and chips, or chewing on a straw or cocktail stick.\par \par Cravings associated with social smoking\par When friends, family, and co-workers smoke around you, it is doubly difficult to quit or avoid relapse. TIP: Your social circles need to know that you are changing your habits so talk about your decision to quit. Let them know they won’t be able to smoke when you’re in the car with them or taking a coffee break together. \par \par In your workplace, don’t take all your coffee breaks with smokers only, do something else instead, or find non-smokers to have your breaks with.\par Additional tips to deal with cravings and withdrawal symptoms\par Stay active: Keep yourself distracted and occupied, go for walks.\par Keep your hands/fingers busy: Squeeze balls, pencils, or paper clips are good substitutes to satisfy that need for tactile stimulation.\par Keep your mind busy: Read a book or magazine, listen to some music you love.\par Find an oral substitute: Keep other things around to pop in your mouth when you’re craving a cigarette.\par Good choices include mints, hard candy, carrot or celery sticks, gum, and sunflower seeds.\par Drink lots of water: Flushing toxins from your body minimizes withdrawal symptoms and helps cravings pass faster.\par Look for new ways to relax and to cope with depression or anxiety: There are a lot of ways to improve your mood without smoking. \par \par Finding the resources and support to quit smoking\par There are many different methods that have successfully helped people to quit smoking, including:\par Quitting smoking cold turkey.\par Systematically decreasing the number of cigarettes you smoke.\par Reducing your intake of nicotine gradually over time.\par Using nicotine replacement therapy or non-nicotine medications to reduce withdrawal symptoms.\par Utilizing nicotine support groups.\par Trying hypnosis, acupuncture, or counseling using cognitive behavioral techniques.\par You may be successful with the first method you try. More likely, you’ll have to try a number of different methods or a combination of treatments to find the ones that work best for you.\par \par Medication therapy\par Smoking cessation medications can ease withdrawal symptoms and reduce cravings, and are most effective when used as part of a comprehensive stop smoking program monitored by your physician. Talk to your doctor about your options and whether an anti-smoking medication is right for you. U.S. Food and Drug Administration (FDA) approved options are: \par \par Nicotine Replacement Therapy\par Nicotine replacement therapy involves "replacing" cigarettes with other nicotine substitutes, such as nicotine gum or a nicotine patch. It works by delivering small and steady doses of nicotine into the body to relieve some of the withdrawal symptoms without the tars and poisonous gases found in cigarettes. This type of treatment helps smokers focus on breaking their psychological addiction and makes it easier to concentrate on learning new behaviors and coping skills.\par \par Non-Nicotine Medication\par These medications help you stop smoking by reducing cravings and withdrawal symptoms without the use of nicotine. Medications such as bupropion (Zyban) and varenicline (Chantix) are intended for short-term use only.\par \par Non-medication therapies\par There are several things you can do to stop smoking that don’t involve nicotine replacement therapy or prescription medications:\par Hypnosis\par A popular option that has produced good results. Forget anything you may have seen from stage hypnotists, hypnosis works by getting you into a deeply relaxed state where you are open to suggestions that strengthen your resolve to quit smoking and increase your negative feelings toward cigarettes. Ask your doctor to recommend a qualified smoking cessation hypnotherapist in your area or refer to the American Society of Clinical Hypnosis (ASCH) for guidelines on selecting a qualified professional.\par Acupuncture\par One of the oldest known medical techniques, acupuncture is believed to work by triggering the release of endorphins (natural pain relievers) that allow the body to relax. As a smoking cessation aid, acupuncture can be helpful in managing smoking withdrawal symptoms. Ask your doctor for a referral or search for a local practitioner at the American Association of Acupuncture and [employer] (AAAOM).\par Behavioral Therapy\par Nicotine addiction is related to the habitual behaviors (the “rituals”) involved in smoking. Behavior therapy focuses on learning new coping skills and breaking those habits. The American Lung Association offers a free online smoking cessation program that focuses on behavioral change. To find a local behavioral therapist, check with your doctor or search at the Association for Behavioral and Cognitive Therapies (ABCT).\par Motivational Therapies\par Self-help books and websites can provide a number of ways to motivate yourself to quit smoking. One well known example is calculating the monetary savings. Some people have been able to find the motivation to quit just by calculating how much money they will save after they quit. It may be enough to pay for a summer vacation.\par \par [facility]\par [address]\[place] [address]\par [phone]\par [email or web address]\par \par \par WEIGHT GOALS:\par Category				BMI range - kg/m2	BMI Prime\par Very severely underweight		less than 15		less than 0.60	\par Severely underweight			from 15.0 to 16.0		from 0.60 to 0.64	\par Underweight				from 16.0 to 18.5		from 0.64 to 0.74	\par Normal (healthy weight)			from 18.5 to 25		from 0.74 to 1.0	\par Overweight				from 25 to 30		from 1.0 to 1.2	\par Obese Class I (Moderately obese)		from 30 to 35		from 1.2 to 1.4	\par Obese Class II (Severely obese)		from 35 to 40		from 1.4 to 1.6	\par Obese Class III (Very severely obese)	over 40	over 1.6				\par \par Your current weight is 99 lbs (91 lbs on 2/23/21; 98 lbs on 8/12/21; 100 lbs on 2/5/2020; 105 lbs on 7/31/19; 85 lbs on 1/16/19; 81 lbs on 6/21/18; 85 lbs on 1/9/18). Given current weight and height 5'4", your calculated body mass index (BMI) is 17 kg/sqm. Normal BMI is 18.5-25 kg/sqm. Thus current weight is in the underweight category. Abdominal waist circumference is measured at the level of the umbilicus and is thus not a pants waist measurement. Your current abdominal waist circumference is 32 in (31 in on 2/24/21). Normal abdominal waist circumference is < 32 inches for women and < 37 inches for men.

## 2021-11-15 ENCOUNTER — LABORATORY RESULT (OUTPATIENT)
Age: 66
End: 2021-11-15

## 2021-11-15 ENCOUNTER — APPOINTMENT (OUTPATIENT)
Dept: CARDIOTHORACIC SURGERY | Facility: CLINIC | Age: 66
End: 2021-11-15
Payer: MEDICARE

## 2021-11-15 ENCOUNTER — OUTPATIENT (OUTPATIENT)
Dept: OUTPATIENT SERVICES | Facility: HOSPITAL | Age: 66
LOS: 1 days | End: 2021-11-15
Payer: COMMERCIAL

## 2021-11-15 ENCOUNTER — NON-APPOINTMENT (OUTPATIENT)
Age: 66
End: 2021-11-15

## 2021-11-15 VITALS
OXYGEN SATURATION: 98 % | RESPIRATION RATE: 16 BRPM | WEIGHT: 99 LBS | DIASTOLIC BLOOD PRESSURE: 82 MMHG | BODY MASS INDEX: 16.9 KG/M2 | HEIGHT: 64 IN | SYSTOLIC BLOOD PRESSURE: 133 MMHG | HEART RATE: 71 BPM

## 2021-11-15 DIAGNOSIS — Z90.89 ACQUIRED ABSENCE OF OTHER ORGANS: Chronic | ICD-10-CM

## 2021-11-15 DIAGNOSIS — Z94.5 SKIN TRANSPLANT STATUS: Chronic | ICD-10-CM

## 2021-11-15 DIAGNOSIS — I35.0 NONRHEUMATIC AORTIC (VALVE) STENOSIS: ICD-10-CM

## 2021-11-15 PROCEDURE — 99024 POSTOP FOLLOW-UP VISIT: CPT

## 2021-11-15 PROCEDURE — 93306 TTE W/DOPPLER COMPLETE: CPT

## 2021-11-15 PROCEDURE — 93306 TTE W/DOPPLER COMPLETE: CPT | Mod: 26

## 2021-11-15 PROCEDURE — 93000 ELECTROCARDIOGRAM COMPLETE: CPT

## 2021-11-15 NOTE — PHYSICAL EXAM
[Neck Appearance] : the appearance of the neck was normal [Jugular Venous Distention Increased] : there was no jugular-venous distention [] : no respiratory distress [Respiration, Rhythm And Depth] : normal respiratory rhythm and effort [Exaggerated Use Of Accessory Muscles For Inspiration] : no accessory muscle use [Auscultation Breath Sounds / Voice Sounds] : lungs were clear to auscultation bilaterally [Apical Impulse] : the apical impulse was normal [Heart Rate And Rhythm] : heart rate was normal and rhythm regular [Murmurs] : no murmurs [Arterial Pulses Carotid] : carotid pulses were normal with no bruits [Arterial Pulses Femoral] : femoral pulses were normal without bruits [Edema] : there was no peripheral edema [Bowel Sounds] : normal bowel sounds [Abdomen Soft] : soft [Abdomen Tenderness] : non-tender [Abnormal Walk] : normal gait [Musculoskeletal - Swelling] : no joint swelling seen [Cranial Nerves] : cranial nerves 2-12 were intact [Deep Tendon Reflexes (DTR)] : deep tendon reflexes were 2+ and symmetric [Sensation] : the sensory exam was normal to light touch and pinprick [Oriented To Time, Place, And Person] : oriented to person, place, and time [Impaired Insight] : insight and judgment were intact [Affect] : the affect was normal

## 2021-11-18 ENCOUNTER — APPOINTMENT (OUTPATIENT)
Dept: PAIN MANAGEMENT | Facility: CLINIC | Age: 66
End: 2021-11-18
Payer: MEDICARE

## 2021-11-18 ENCOUNTER — APPOINTMENT (OUTPATIENT)
Dept: PAIN MANAGEMENT | Facility: CLINIC | Age: 66
End: 2021-11-18

## 2021-11-18 VITALS
HEIGHT: 64 IN | BODY MASS INDEX: 16.9 KG/M2 | HEART RATE: 83 BPM | WEIGHT: 99 LBS | SYSTOLIC BLOOD PRESSURE: 126 MMHG | DIASTOLIC BLOOD PRESSURE: 84 MMHG

## 2021-11-18 PROCEDURE — 99213 OFFICE O/P EST LOW 20 MIN: CPT

## 2021-11-18 NOTE — ASSESSMENT
[FreeTextEntry1] : 65 y/o F with chronic pain and Migraines \par \par I-Stop reviewed,  reference #: [id number]\par no changes to meds today, no refills needed today\par UDS done today\par No signs of aberrant behavior and will continue to monitor for signs of toxicity.\par Reminded to continue to avoid alcohol.\par \par I am seeing ANTIONETTE GONZALEZ as incident to service. Dr. Lancaster is present in the office suite immediately available and able to provide assistance and direction throughout the time the service was performed.\par \par

## 2021-11-18 NOTE — PHYSICAL EXAM
[Oriented To Time, Place, And Person] : oriented to person, place, and time [Affect] : the affect was normal [Mood] : the mood was normal [Person] : oriented to person [Place] : oriented to place [Time] : oriented to time [Cranial Nerves Facial (VII)] : face symmetrical [Cranial Nerves Vestibulocochlear (VIII)] : hearing was intact bilaterally [Cranial Nerves Accessory (XI - Cranial And Spinal)] : head turning and shoulder shrug symmetric [Cranial Nerves Hypoglossal (XII)] : there was no tongue deviation with protrusion [Motor Strength] : muscle strength was normal in all four extremities [Motor Handedness Right-Handed] : the patient is right hand dominant [Abnormal Walk] : normal gait [Sclera] : the sclera and conjunctiva were normal [PERRL With Normal Accommodation] : pupils were equal in size, round, reactive to light, with normal accommodation [Extraocular Movements] : extraocular movements were intact [] : no respiratory distress [FreeTextEntry1] : no signs of toxicity [Paresis Pronator Drift Right-Sided] : no pronator drift on the right [Paresis Pronator Drift Left-Sided] : no pronator drift on the left [Motor Strength Upper Extremities Bilaterally] : strength was normal in both upper extremities [Motor Strength Lower Extremities Bilaterally] : strength was normal in both lower extremities

## 2021-11-18 NOTE — HISTORY OF PRESENT ILLNESS
[FreeTextEntry1] : 65 y/o F Pmhx HTN, HLD, CAD , severe MR s/p recent Tricuspid valve replacement , seizures, breast CA, migraines who presents today for follow up for chronic pain. Chronic pain in her left foot unchanged since last visit that comes and goes 7/10 on average but up to 10/10 exacerbated by changes in weather.  Migraines are occurring every other day described as a sharp pain on the right side of head 10/10 that can last hours to days associated with phonophobia.  \par \par Current meds: hydrocodone 10/325mg 3-4x/day; clobazam, carbamazepine 200 mg TID, MM ( not effective and last used 6 months ago)\par

## 2021-12-04 ENCOUNTER — APPOINTMENT (OUTPATIENT)
Dept: INTERNAL MEDICINE | Facility: CLINIC | Age: 66
End: 2021-12-04

## 2021-12-09 ENCOUNTER — APPOINTMENT (OUTPATIENT)
Dept: PAIN MANAGEMENT | Facility: CLINIC | Age: 66
End: 2021-12-09
Payer: MEDICARE

## 2021-12-09 VITALS
HEIGHT: 64 IN | HEART RATE: 87 BPM | SYSTOLIC BLOOD PRESSURE: 135 MMHG | BODY MASS INDEX: 16.9 KG/M2 | DIASTOLIC BLOOD PRESSURE: 83 MMHG | WEIGHT: 99 LBS

## 2021-12-09 PROCEDURE — 99213 OFFICE O/P EST LOW 20 MIN: CPT

## 2021-12-09 NOTE — PHYSICAL EXAM
[Oriented To Time, Place, And Person] : oriented to person, place, and time [Affect] : the affect was normal [Mood] : the mood was normal [FreeTextEntry1] : no signs of toxicity [Person] : oriented to person [Place] : oriented to place [Time] : oriented to time [Cranial Nerves Facial (VII)] : face symmetrical [Cranial Nerves Vestibulocochlear (VIII)] : hearing was intact bilaterally [Cranial Nerves Accessory (XI - Cranial And Spinal)] : head turning and shoulder shrug symmetric [Cranial Nerves Hypoglossal (XII)] : there was no tongue deviation with protrusion [Motor Strength] : muscle strength was normal in all four extremities [Motor Handedness Right-Handed] : the patient is right hand dominant [Paresis Pronator Drift Right-Sided] : no pronator drift on the right [Paresis Pronator Drift Left-Sided] : no pronator drift on the left [Motor Strength Upper Extremities Bilaterally] : strength was normal in both upper extremities [Motor Strength Lower Extremities Bilaterally] : strength was normal in both lower extremities [Abnormal Walk] : normal gait [Sclera] : the sclera and conjunctiva were normal [PERRL With Normal Accommodation] : pupils were equal in size, round, reactive to light, with normal accommodation [Extraocular Movements] : extraocular movements were intact [] : no respiratory distress

## 2021-12-09 NOTE — ASSESSMENT
[FreeTextEntry1] : Chronic pain . \par Decrease amount of Vicodin from 120 tab / month to 110tabs. Pt is aware and agrees to this  plan.\par RTO 1 month \par \par Dr Murphy on site , billed incident to service. \par  [Opioids] : Patient was explained in detail about pain control by using opioids. Patient has signed and fully understands our guidelines for medication and drug screening.  Patient understands the side effects of opioids, including, but not limited to, drug tolerance, dependence, potential for addiction. This class of drugs is habit-forming and ELIJAH regulated. The sedative effects of opioids can be potentiated by taking alcohol or any sleeping pills, along with opioids. The decision to drive is patient’s responsibility, as opioids can affect his/her driving ability and ability to concentrate. The long-term place is not clear, however, patient understands that once the pain control optimizes, the goal will be to wean off the opioids. All the issues regarding opioid treatment have been addressed satisfactorily.

## 2021-12-09 NOTE — HISTORY OF PRESENT ILLNESS
[FreeTextEntry1] : istop#[id number]\par Pt is s/p recent tricuspid valve replacement . Has a hx of migriane , breast cancer seizure and left foot pain from a hx of a fractured heel. \par Taking Vicodin 10/325mg - 3-4 tablets/ day. Changes in weather is a big trigger for  pain.\par No signs of toxicity or aberrant behavior . \par Mood has been good . Pt denies alcohol use. \par Discussed with patient decreasing dose of Vicodin - plan for this month decrease for 110 tab for 30 days. Pt is aware.

## 2021-12-10 ENCOUNTER — RX RENEWAL (OUTPATIENT)
Age: 66
End: 2021-12-10

## 2021-12-14 ENCOUNTER — APPOINTMENT (OUTPATIENT)
Dept: SURGICAL ONCOLOGY | Facility: CLINIC | Age: 66
End: 2021-12-14
Payer: MEDICARE

## 2021-12-14 VITALS
RESPIRATION RATE: 16 BRPM | BODY MASS INDEX: 16.9 KG/M2 | SYSTOLIC BLOOD PRESSURE: 146 MMHG | WEIGHT: 99 LBS | HEART RATE: 86 BPM | DIASTOLIC BLOOD PRESSURE: 89 MMHG | HEIGHT: 64 IN | OXYGEN SATURATION: 95 %

## 2021-12-14 PROCEDURE — 99214 OFFICE O/P EST MOD 30 MIN: CPT

## 2021-12-14 NOTE — ASSESSMENT
[FreeTextEntry1] : Imp:\par No evidence of new or recurrent lesions. Breast imaging failed to identify any suspicious lesions. No suspicious lesions on exam\par \par Plan:\par Continue yearly surveillance with [facility]/US  (next 6/2022)\par RTO in 1 yr \par \par \par

## 2021-12-14 NOTE — PHYSICAL EXAM
[Normal] : supple, no neck mass and thyroid not enlarged [Normal Supraclavicular Lymph Nodes] : normal supraclavicular lymph nodes [Normal Axillary Lymph Nodes] : normal axillary lymph nodes [Normal] : oriented to person, place and time, with appropriate affect [FreeTextEntry1] : TAYLOR present during exam  [de-identified] : S1s2, normal rate and rhythm, no MRG [de-identified] : s/p left lumpectomy-well healed scar. Left smaller than right. No masses.  [de-identified] : normal respiratory effort, lungs clear to auscultation

## 2021-12-14 NOTE — HISTORY OF PRESENT ILLNESS
[de-identified] : 66 year old female presents for a  follow up visit for her annual breast exam.\par \par Her history is notable for a left breast lumpectomy (1998) for stage 1 left breast cancer with adjuvant chemotherapy and radiation therapy. She suffered a recurrence and had re excision twice, and then completed 5 years of tamoxifen and 5 years of Femara under the supervision of Dr. Murray. Family history of breast cancer involves her maternal aunt. Patient is BRCA Negative. \par \par Her most recent screening mammo/US was done on 6/21/21 which revealed Dense breasts,  No mammographic evidence of malignancy. US revealed no sonographic evidence of malignancy. BI-RADS-2 \par \par Denies palpable breast masses, nipple discharge, skin changes, inversion or breast pain. \par Pt. is s/p recent tricuspid valve replacement on 10/18/2021. Has a hx of migraines. Continues f/u with Neuro and Cadiology.

## 2021-12-17 ENCOUNTER — APPOINTMENT (OUTPATIENT)
Dept: SURGICAL ONCOLOGY | Facility: CLINIC | Age: 66
End: 2021-12-17

## 2021-12-20 ENCOUNTER — APPOINTMENT (OUTPATIENT)
Dept: NEUROLOGY | Facility: CLINIC | Age: 66
End: 2021-12-20
Payer: MEDICARE

## 2021-12-20 VITALS
WEIGHT: 99 LBS | HEIGHT: 64 IN | HEART RATE: 100 BPM | SYSTOLIC BLOOD PRESSURE: 147 MMHG | BODY MASS INDEX: 16.9 KG/M2 | DIASTOLIC BLOOD PRESSURE: 92 MMHG

## 2021-12-20 PROCEDURE — 99214 OFFICE O/P EST MOD 30 MIN: CPT
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
## 2021-12-20 NOTE — DISCUSSION/SUMMARY
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  We discussed the possibility of epilepsy sx in detail.  She had neuropsych testing which showed diffuse cerebral dysfunction, right greater than left.\par \par Plan:\par - Taking Clobazam 40mg bid and doing better\par - Discussed risks of CBZ in osteoporosis, but risk of stopping medication high in increasing her seizures.  She knows risk of fracture at this time and is being treated by her PMD. Will rediscuss meds with her at next visit (patient with PMD).\par - encouraged 3 servings Ca+ in diet and low weight bearing exercises\par - reviewed seizure triggers\par - f/u CBZ next visit (CBC,CMP OK - NOV)\par - good RX to [facility] for meds - much cheaper - looked with her\par -follow up in 2-3 months.\par \par Total time 32 min\par \par . [Medically Refractory (seizure within the last year)] : Medically Refractory (seizure within the last year) [Complex Partial] : complex partial [Symptomatic] : symptomatic [Risks Associated with Driving/[place] Law] : As per my usual protocol, the patient was advised in regards to risks and driving privileges associated with the [place] Guidelines.  [Safety Recommendations] : The patient was advised in regards to the risk of seizures and general seizure safety recommendations including not to be bathing alone, climbing to high places and operating heavy machinery. [Compliance with Medications] : The importance of compliance with medications was reinforced. [Bone Health Education] : Patient was educated in regards to bone health and an increased risk of osteoporosis in patients with epilepsy. [Sleep Hygiene/Sleep Disruption Risks] : Sleep hygiene and the risks of sleep disruption were discussed. [Surgical Options/Risks/Benefits] : Surgical options/risks/benefits for intractable epilepsy were discussed. [Risk of Death] : Risk of death associated with seizures / SUDEP was discussed. [Opioids] : Patient was explained in detail about pain control by using opioids. Patient has signed and fully understands our guidelines for medication and drug screening.  Patient understands the side effects of opioids, including, but not limited to, drug tolerance, dependence, potential for addiction. This class of drugs is habit-forming and [place] regulated. The sedative effects of opioids can be potentiated by taking alcohol or any sleeping pills, along with opioids. The decision to drive is patient’s responsibility, as opioids can affect his/her driving ability and ability to concentrate. The long-term place is not clear, however, patient understands that once the pain control optimizes, the goal will be to wean off the opioids. All the issues regarding opioid treatment have been addressed satisfactorily.

## 2022-01-11 ENCOUNTER — APPOINTMENT (OUTPATIENT)
Dept: PAIN MANAGEMENT | Facility: CLINIC | Age: 67
End: 2022-01-11

## 2022-01-12 ENCOUNTER — APPOINTMENT (OUTPATIENT)
Dept: INTERNAL MEDICINE | Facility: CLINIC | Age: 67
End: 2022-01-12
Payer: MEDICARE

## 2022-01-12 VITALS
DIASTOLIC BLOOD PRESSURE: 72 MMHG | SYSTOLIC BLOOD PRESSURE: 136 MMHG | WEIGHT: 99 LBS | HEART RATE: 88 BPM | OXYGEN SATURATION: 93 % | TEMPERATURE: 97.6 F | BODY MASS INDEX: 16.99 KG/M2

## 2022-01-12 VITALS — OXYGEN SATURATION: 97 %

## 2022-01-12 DIAGNOSIS — R56.9 UNSPECIFIED CONVULSIONS: ICD-10-CM

## 2022-01-12 DIAGNOSIS — Z23 ENCOUNTER FOR IMMUNIZATION: ICD-10-CM

## 2022-01-12 DIAGNOSIS — G40.909 EPILEPSY, UNSPECIFIED, NOT INTRACTABLE, W/OUT STATUS EPILEPTICUS: ICD-10-CM

## 2022-01-12 PROCEDURE — 99214 OFFICE O/P EST MOD 30 MIN: CPT

## 2022-01-12 NOTE — HISTORY OF PRESENT ILLNESS
[FreeTextEntry1] : f/u \par \par pt has transcatheter Mitral valve repair last year , feels OK \par no chest pain or SON / palpitations\par seizures still poorly controlled\par \par had a seizure last week and fell, cut the forehead over R eyebrow- had to have sutures\par \par the pt has upcoming appt w/ cards and neurology \par \par reviewed neuro / cards notes \par \par

## 2022-01-12 NOTE — ASSESSMENT
[FreeTextEntry1] : 1)  Seizures\par - poor control\par - ct CBZ and Onfi\par - sutures to be removed next week\par - Td \par \par 2) COPD\par ct to smoke\par - refuses to quit\par - CT scan in 6/22\par \par 3) breast cancer \par - follows w/ onc\par - mammo later this year\par \par needs colonoscopy \par Td and shingrix

## 2022-01-12 NOTE — PHYSICAL EXAM
[Normal Outer Ear/Nose] : the outer ears and nose were normal in appearance [No JVD] : no jugular venous distention [No Edema] : there was no peripheral edema [Normal] : soft, non-tender, non-distended, no masses palpated, no HSM and normal bowel sounds [de-identified] : healing laceration R eyebrow

## 2022-01-14 ENCOUNTER — OUTPATIENT (OUTPATIENT)
Dept: OUTPATIENT SERVICES | Facility: HOSPITAL | Age: 67
LOS: 1 days | Discharge: HOME | End: 2022-01-14

## 2022-01-14 DIAGNOSIS — Z94.5 SKIN TRANSPLANT STATUS: Chronic | ICD-10-CM

## 2022-01-14 DIAGNOSIS — D64.9 ANEMIA, UNSPECIFIED: ICD-10-CM

## 2022-01-14 DIAGNOSIS — Z90.89 ACQUIRED ABSENCE OF OTHER ORGANS: Chronic | ICD-10-CM

## 2022-01-18 ENCOUNTER — APPOINTMENT (OUTPATIENT)
Age: 67
End: 2022-01-18
Payer: MEDICARE

## 2022-01-18 ENCOUNTER — APPOINTMENT (OUTPATIENT)
Dept: HEMATOLOGY ONCOLOGY | Facility: CLINIC | Age: 67
End: 2022-01-18

## 2022-01-18 VITALS
HEIGHT: 64 IN | TEMPERATURE: 97.9 F | WEIGHT: 102.27 LBS | OXYGEN SATURATION: 98 % | DIASTOLIC BLOOD PRESSURE: 85 MMHG | BODY MASS INDEX: 17.46 KG/M2 | HEART RATE: 78 BPM | RESPIRATION RATE: 16 BRPM | SYSTOLIC BLOOD PRESSURE: 133 MMHG

## 2022-01-18 PROCEDURE — 99214 OFFICE O/P EST MOD 30 MIN: CPT

## 2022-01-18 NOTE — REVIEW OF SYSTEMS
[Joint Pain] : joint pain [Joint Stiffness] : joint stiffness [Negative] : Integumentary [de-identified] : continues to present seizures

## 2022-01-18 NOTE — PHYSICAL EXAM
[Ambulatory and capable of all self care but unable to carry out any work activities] : Status 2- Ambulatory and capable of all self care but unable to carry out any work activities. Up and about more than 50% of waking hours [Normal] : RRR, normal S1S2, no murmurs, rubs, gallops [de-identified] : s/p left breast lumpectomy, small circumareolar scar well healed

## 2022-01-18 NOTE — HISTORY OF PRESENT ILLNESS
[de-identified] : 66 F with past medical history of seizures, cardiomyopathy, HLD, CAD, MR, chronic lower back pain, osteoporosis, with history of recurrent left breast cancer, returning for medical oncology follow-up.\par \par CASE SYNOPSIS:\par 1998–stage I left breast cancer; received adjuvant CMF and chest wall radiation.\par \par 19 99–2004–completes 5 years of tamoxifen.\par \par 10/2004 -11/2009: Letrozole for left breast cancer recurrence (under the care of Dr. Murray).\par \par 6/21/2021–US breast bilateral/screening mammogram: Dense breasts; no mammographic or sonographic evidence of malignancy. [FreeTextEntry1] : Off  HRT since 2009 [de-identified] : Patient is new to me; former patient of Dr. Adamson.  Here for annual medical oncology follow up; last seen in office in January 2021. Ms. GONZALEZ is doing well; she has been off adjuvant hormonal treatment since 2009.  Clinically her chief complaint is recurrent seizures (last episode happened 2 weeks ago, complicated with a fall and trauma to forehead over right eyebrow for which she had to have sutures). Last mammogram (6 /21 /21 ) consistent with dense breasts, but no evidence of recurrent malignancy.  Most recent laboratory tests (November 2021) consistent with normal CBC and CMP, but low vitamin D.

## 2022-01-18 NOTE — ASSESSMENT
[FreeTextEntry1] : Ms. GONZALEZ 's questions were answered to her satisfaction. \par She  expressed her  understanding and willingness to comply with the above recommendations, and  will return to the office in 1 year.\par \par \par \par \par \par

## 2022-01-19 ENCOUNTER — APPOINTMENT (OUTPATIENT)
Dept: INTERNAL MEDICINE | Facility: CLINIC | Age: 67
End: 2022-01-19
Payer: MEDICARE

## 2022-01-19 VITALS
SYSTOLIC BLOOD PRESSURE: 132 MMHG | BODY MASS INDEX: 17.51 KG/M2 | HEART RATE: 117 BPM | DIASTOLIC BLOOD PRESSURE: 80 MMHG | OXYGEN SATURATION: 97 % | TEMPERATURE: 97.8 F | WEIGHT: 102 LBS

## 2022-01-19 DIAGNOSIS — T14.8XXA OTHER INJURY OF UNSPECIFIED BODY REGION, INITIAL ENCOUNTER: ICD-10-CM

## 2022-01-19 PROCEDURE — 99213 OFFICE O/P EST LOW 20 MIN: CPT | Mod: 25

## 2022-01-20 LAB — 25(OH)D3 SERPL-MCNC: 35.6 NG/ML

## 2022-01-21 ENCOUNTER — NON-APPOINTMENT (OUTPATIENT)
Age: 67
End: 2022-01-21

## 2022-02-10 ENCOUNTER — APPOINTMENT (OUTPATIENT)
Dept: PAIN MANAGEMENT | Facility: CLINIC | Age: 67
End: 2022-02-10
Payer: MEDICARE

## 2022-02-10 VITALS
BODY MASS INDEX: 17.42 KG/M2 | WEIGHT: 102 LBS | DIASTOLIC BLOOD PRESSURE: 80 MMHG | HEART RATE: 84 BPM | SYSTOLIC BLOOD PRESSURE: 135 MMHG | HEIGHT: 64 IN

## 2022-02-10 PROCEDURE — 99213 OFFICE O/P EST LOW 20 MIN: CPT

## 2022-02-10 NOTE — ASSESSMENT
[FreeTextEntry1] : Chronic pain . \par \par RTO 1 month \par \par Dr Murphy on site , billed incident to service. \par  [Opioids] : Patient was explained in detail about pain control by using opioids. Patient has signed and fully understands our guidelines for medication and drug screening.  Patient understands the side effects of opioids, including, but not limited to, drug tolerance, dependence, potential for addiction. This class of drugs is habit-forming and [place] regulated. The sedative effects of opioids can be potentiated by taking alcohol or any sleeping pills, along with opioids. The decision to drive is patient’s responsibility, as opioids can affect his/her driving ability and ability to concentrate. The long-term place is not clear, however, patient understands that once the pain control optimizes, the goal will be to wean off the opioids. All the issues regarding opioid treatment have been addressed satisfactorily.

## 2022-02-10 NOTE — HISTORY OF PRESENT ILLNESS
[FreeTextEntry1] : istop#[id number]\par Pt is s/p recent tricuspid valve replacement . Has a hx of migriane , breast cancer seizure and left foot pain from a hx of a fractured heel. \par Taking Vicodin 10/325mg - 3-4 tablets/ day. Pt has adjusted to 110 tab/ month .Changes in weather is a big trigger for  pain.\par No signs of toxicity or aberrant behavior . \par Mood has been good . Pt denies alcohol use. \par Pt explains last month she had a seizure and hot her head. She did go to the ED - required stitches to right forehead. Also had a CT head and tells me it was negative . \par

## 2022-03-10 ENCOUNTER — APPOINTMENT (OUTPATIENT)
Dept: PAIN MANAGEMENT | Facility: CLINIC | Age: 67
End: 2022-03-10
Payer: MEDICARE

## 2022-03-10 VITALS
WEIGHT: 102 LBS | OXYGEN SATURATION: 98 % | DIASTOLIC BLOOD PRESSURE: 86 MMHG | HEIGHT: 64 IN | HEART RATE: 86 BPM | SYSTOLIC BLOOD PRESSURE: 136 MMHG | BODY MASS INDEX: 17.42 KG/M2 | TEMPERATURE: 97.6 F

## 2022-03-10 PROCEDURE — 99213 OFFICE O/P EST LOW 20 MIN: CPT

## 2022-03-10 NOTE — ASSESSMENT
[FreeTextEntry1] : Chronic pain . No changes today . \par We will continue to monitor ISTOP and for signs of toxicity. \par \par RTO 1 month \par \par Dr Murphy on site , billed incident to service. \par  [Opioids] : Patient was explained in detail about pain control by using opioids. Patient has signed and fully understands our guidelines for medication and drug screening.  Patient understands the side effects of opioids, including, but not limited to, drug tolerance, dependence, potential for addiction. This class of drugs is habit-forming and [place] regulated. The sedative effects of opioids can be potentiated by taking alcohol or any sleeping pills, along with opioids. The decision to drive is patient’s responsibility, as opioids can affect his/her driving ability and ability to concentrate. The long-term place is not clear, however, patient understands that once the pain control optimizes, the goal will be to wean off the opioids. All the issues regarding opioid treatment have been addressed satisfactorily.

## 2022-03-10 NOTE — HISTORY OF PRESENT ILLNESS
[FreeTextEntry1] : istop#[id number]\par pt returns today for a pain management follow up appt .\par Continues to have chronic left foot pain - worsened by cold, wet , weather. Also has migraines . \par Denies any new symptoms. \par Denies any seizures . \par  [____ / 10] : [unfilled]/10 [] : No

## 2022-03-21 ENCOUNTER — APPOINTMENT (OUTPATIENT)
Dept: NEUROLOGY | Facility: CLINIC | Age: 67
End: 2022-03-21
Payer: MEDICARE

## 2022-03-21 VITALS
HEART RATE: 74 BPM | SYSTOLIC BLOOD PRESSURE: 146 MMHG | HEIGHT: 64 IN | BODY MASS INDEX: 17.42 KG/M2 | WEIGHT: 102 LBS | DIASTOLIC BLOOD PRESSURE: 78 MMHG

## 2022-03-21 LAB
ALBUMIN SERPL ELPH-MCNC: 4.9 G/DL
ALP BLD-CCNC: 130 U/L
ALT SERPL-CCNC: 6 U/L
ANION GAP SERPL CALC-SCNC: 14 MMOL/L
APTT BLD: 31.5 SEC
AST SERPL-CCNC: 14 U/L
BASOPHILS # BLD AUTO: 0.05 K/UL
BASOPHILS NFR BLD AUTO: 0.6 %
BILIRUB SERPL-MCNC: 0.3 MG/DL
BUN SERPL-MCNC: 8 MG/DL
CALCIUM SERPL-MCNC: 10.1 MG/DL
CHLORIDE SERPL-SCNC: 105 MMOL/L
CK SERPL-CCNC: 82 U/L
CO2 SERPL-SCNC: 25 MMOL/L
CREAT SERPL-MCNC: 0.55 MG/DL
EOSINOPHIL # BLD AUTO: 0.17 K/UL
EOSINOPHIL NFR BLD AUTO: 2.1 %
GLUCOSE SERPL-MCNC: 88 MG/DL
HCT VFR BLD CALC: 41.8 %
HGB BLD-MCNC: 13.5 G/DL
IMM GRANULOCYTES NFR BLD AUTO: 0.2 %
LYMPHOCYTES # BLD AUTO: 2.69 K/UL
LYMPHOCYTES NFR BLD AUTO: 33.5 %
MAN DIFF?: NORMAL
MCHC RBC-ENTMCNC: 32.3 GM/DL
MCHC RBC-ENTMCNC: 32.9 PG
MCV RBC AUTO: 102 FL
MONOCYTES # BLD AUTO: 0.53 K/UL
MONOCYTES NFR BLD AUTO: 6.6 %
NEUTROPHILS # BLD AUTO: 4.58 K/UL
NEUTROPHILS NFR BLD AUTO: 57 %
PLATELET # BLD AUTO: 256 K/UL
POTASSIUM SERPL-SCNC: 4.7 MMOL/L
PROT SERPL-MCNC: 8 G/DL
RBC # BLD: 4.1 M/UL
RBC # FLD: 14.2 %
SODIUM SERPL-SCNC: 144 MMOL/L
WBC # FLD AUTO: 8.04 K/UL

## 2022-03-21 PROCEDURE — 99214 OFFICE O/P EST MOD 30 MIN: CPT

## 2022-03-21 NOTE — DISCUSSION/SUMMARY
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  We discussed the possibility of epilepsy sx in detail.  She had neuropsych testing which showed diffuse cerebral dysfunction, right greater than left.\par \par Plan:\par - Taking Clobazam 40mg bid and doing better\par - Discussed risks of CBZ in osteoporosis, but risk of stopping medication high in increasing her seizures.  She knows risk of fracture at this time and is being treated by her PMD. We decided to change her to 200mg bid (Carbatrol) and measure level in 2 weeks. Knows risks of doing so, but does not want to change medications at this time.\par - encouraged 3 servings Ca+ in diet and low weight bearing exercises\par - reviewed seizure triggers\par - f/u CBZ  (CBC,CMP)\par - good RX to [facility] for meds - much cheaper - looked with her\par -follow up in 2-3 months.\par \par Total time 32 min\par \par . [Medically Refractory (seizure within the last year)] : Medically Refractory (seizure within the last year) [Complex Partial] : complex partial [Symptomatic] : symptomatic [Risks Associated with Driving/[place]] : As per my usual protocol, the patient was advised in regards to risks and driving privileges associated with the [place] Guidelines.  [Safety Recommendations] : The patient was advised in regards to the risk of seizures and general seizure safety recommendations including not to be bathing alone, climbing to high places and operating heavy machinery. [Compliance with Medications] : The importance of compliance with medications was reinforced. [Bone Health Education] : Patient was educated in regards to bone health and an increased risk of osteoporosis in patients with epilepsy. [Sleep Hygiene/Sleep Disruption Risks] : Sleep hygiene and the risks of sleep disruption were discussed. [Surgical Options/Risks/Benefits] : Surgical options/risks/benefits for intractable epilepsy were discussed. [Risk of Death] : Risk of death associated with seizures / SUDEP was discussed. [Opioids] : Patient was explained in detail about pain control by using opioids. Patient has signed and fully understands our guidelines for medication and drug screening.  Patient understands the side effects of opioids, including, but not limited to, drug tolerance, dependence, potential for addiction. This class of drugs is habit-forming and [place] regulated. The sedative effects of opioids can be potentiated by taking alcohol or any sleeping pills, along with opioids. The decision to drive is patient’s responsibility, as opioids can affect his/her driving ability and ability to concentrate. The long-term place is not clear, however, patient understands that once the pain control optimizes, the goal will be to wean off the opioids. All the issues regarding opioid treatment have been addressed satisfactorily.
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
## 2022-03-23 ENCOUNTER — APPOINTMENT (OUTPATIENT)
Dept: CARDIOLOGY | Facility: CLINIC | Age: 67
End: 2022-03-23
Payer: MEDICARE

## 2022-03-23 ENCOUNTER — NON-APPOINTMENT (OUTPATIENT)
Age: 67
End: 2022-03-23

## 2022-03-23 VITALS — SYSTOLIC BLOOD PRESSURE: 111 MMHG | DIASTOLIC BLOOD PRESSURE: 62 MMHG | HEART RATE: 83 BPM | OXYGEN SATURATION: 97 %

## 2022-03-23 VITALS — HEART RATE: 92 BPM | SYSTOLIC BLOOD PRESSURE: 118 MMHG | OXYGEN SATURATION: 97 % | DIASTOLIC BLOOD PRESSURE: 71 MMHG

## 2022-03-23 VITALS
BODY MASS INDEX: 17.07 KG/M2 | WEIGHT: 100 LBS | TEMPERATURE: 97 F | HEIGHT: 64 IN | RESPIRATION RATE: 16 BRPM | SYSTOLIC BLOOD PRESSURE: 120 MMHG | HEART RATE: 84 BPM | DIASTOLIC BLOOD PRESSURE: 66 MMHG | OXYGEN SATURATION: 99 %

## 2022-03-23 VITALS — OXYGEN SATURATION: 97 % | HEART RATE: 93 BPM | SYSTOLIC BLOOD PRESSURE: 109 MMHG | DIASTOLIC BLOOD PRESSURE: 71 MMHG

## 2022-03-23 VITALS — DIASTOLIC BLOOD PRESSURE: 67 MMHG | OXYGEN SATURATION: 97 % | SYSTOLIC BLOOD PRESSURE: 98 MMHG | HEART RATE: 92 BPM

## 2022-03-23 LAB
25(OH)D3 SERPL-MCNC: 39.7 NG/ML
ALBUMIN SERPL ELPH-MCNC: 4.4 G/DL
ALP BLD-CCNC: 115 U/L
ALT SERPL-CCNC: 6 U/L
ANION GAP SERPL CALC-SCNC: 19 MMOL/L
APPEARANCE: ABNORMAL
AST SERPL-CCNC: 15 U/L
BACTERIA: ABNORMAL
BASOPHILS # BLD AUTO: 0.07 K/UL
BASOPHILS NFR BLD AUTO: 0.9 %
BILIRUB SERPL-MCNC: <0.2 MG/DL
BILIRUBIN URINE: NEGATIVE
BLOOD URINE: NEGATIVE
BUN SERPL-MCNC: 11 MG/DL
CALCIUM SERPL-MCNC: 9.6 MG/DL
CARBAMAZEPINE SERPL-MCNC: 11.3 UG/ML
CHLORIDE SERPL-SCNC: 102 MMOL/L
CHOLEST SERPL-MCNC: 209 MG/DL
CO2 SERPL-SCNC: 22 MMOL/L
COLOR: YELLOW
CREAT SERPL-MCNC: 0.57 MG/DL
EGFR: 101 ML/MIN/1.73M2
EOSINOPHIL # BLD AUTO: 0.25 K/UL
EOSINOPHIL NFR BLD AUTO: 3.1 %
ESTIMATED AVERAGE GLUCOSE: 103 MG/DL
GLUCOSE QUALITATIVE U: NEGATIVE
GLUCOSE SERPL-MCNC: 75 MG/DL
HBA1C MFR BLD HPLC: 5.2 %
HCT VFR BLD CALC: 38 %
HDLC SERPL-MCNC: 56 MG/DL
HGB BLD-MCNC: 12.5 G/DL
HYALINE CASTS: 1 /LPF
IMM GRANULOCYTES NFR BLD AUTO: 0.5 %
KETONES URINE: NEGATIVE
LDLC SERPL CALC-MCNC: 138 MG/DL
LDLC SERPL DIRECT ASSAY-MCNC: 132 MG/DL
LEUKOCYTE ESTERASE URINE: NEGATIVE
LYMPHOCYTES # BLD AUTO: 2.16 K/UL
LYMPHOCYTES NFR BLD AUTO: 27 %
MAGNESIUM SERPL-MCNC: 2.2 MG/DL
MAN DIFF?: NORMAL
MCHC RBC-ENTMCNC: 32.5 PG
MCHC RBC-ENTMCNC: 32.9 GM/DL
MCV RBC AUTO: 98.7 FL
MICROSCOPIC-UA: NORMAL
MONOCYTES # BLD AUTO: 0.59 K/UL
MONOCYTES NFR BLD AUTO: 7.4 %
NEUTROPHILS # BLD AUTO: 4.9 K/UL
NEUTROPHILS NFR BLD AUTO: 61.1 %
NITRITE URINE: POSITIVE
NONHDLC SERPL-MCNC: 152 MG/DL
NT-PROBNP SERPL-MCNC: 152 PG/ML
PH URINE: 7
PLATELET # BLD AUTO: 260 K/UL
POTASSIUM SERPL-SCNC: 4.7 MMOL/L
PROT SERPL-MCNC: 7.6 G/DL
PROTEIN URINE: NORMAL
RBC # BLD: 3.85 M/UL
RBC # FLD: 13.8 %
RED BLOOD CELLS URINE: 5 /HPF
SODIUM SERPL-SCNC: 142 MMOL/L
SPECIFIC GRAVITY URINE: 1.02
SQUAMOUS EPITHELIAL CELLS: 10 /HPF
T4 FREE SERPL-MCNC: 0.8 NG/DL
TRIGL SERPL-MCNC: 72 MG/DL
TSH SERPL-ACNC: 1.21 UIU/ML
UROBILINOGEN URINE: NORMAL
WBC # FLD AUTO: 8.01 K/UL
WHITE BLOOD CELLS URINE: 3 /HPF

## 2022-03-23 PROCEDURE — 93040 RHYTHM ECG WITH REPORT: CPT | Mod: 59

## 2022-03-23 PROCEDURE — 93000 ELECTROCARDIOGRAM COMPLETE: CPT

## 2022-03-23 PROCEDURE — 36415 COLL VENOUS BLD VENIPUNCTURE: CPT

## 2022-03-23 PROCEDURE — 99215 OFFICE O/P EST HI 40 MIN: CPT | Mod: 25

## 2022-03-23 PROCEDURE — 99407 BEHAV CHNG SMOKING > 10 MIN: CPT | Mod: 25

## 2022-03-23 RX ORDER — OMEGA-3/DHA/EPA/FISH OIL 300-1000MG
1000 CAPSULE,DELAYED RELEASE (ENTERIC COATED) ORAL
Qty: 90 | Refills: 3 | Status: ACTIVE | COMMUNITY
Start: 2022-03-23 | End: 1900-01-01

## 2022-03-23 RX ORDER — COLD-HOT PACK
125 MCG EACH MISCELLANEOUS
Qty: 90 | Refills: 3 | Status: ACTIVE | COMMUNITY
Start: 2022-03-23

## 2022-03-23 RX ORDER — CLOPIDOGREL BISULFATE 75 MG/1
75 TABLET, FILM COATED ORAL DAILY
Qty: 30 | Refills: 0 | Status: COMPLETED | COMMUNITY
Start: 2021-10-20 | End: 2021-11-18

## 2022-03-23 RX ORDER — ERGOCALCIFEROL 1.25 MG/1
1.25 MG CAPSULE, LIQUID FILLED ORAL
Qty: 12 | Refills: 0 | Status: DISCONTINUED | COMMUNITY
Start: 2017-10-26 | End: 2022-03-23

## 2022-03-23 NOTE — CARDIOLOGY SUMMARY
[de-identified] : 3/23/22, normal sinus rhythm at 84 bpm, with nonspecific ST depression   +   negative T-waves, likely non-diagnostic  but cannot r/o anterior  ischemia.  ms / QTc 396 ms.  Prior ECG 10/20/21, normal sinus rhythm at 76 bpm with abnormal T-waves in the inferior and precordial leads;  ms / QTc 465 ms.  Rhythm / RR – interval analysis 3/23/22 observed  117 beats over 90 sec with mean HR 80 bpm; mean  ms (700-806); Max/Min 115%; RR SD24 and RR CV 3%. There were no PVCs or PACs [de-identified] : 6/18/21, small moderate anteroapical and distal inferior defects that are fixed, consistent with infarct. No segmental wall motion abnormality; LVEF 61%. There was no significant change compared to prior study 3/7/17. [de-identified] : 10/19/21, mitral RAND is seen with calcified mitral chordae. Mean mitral gradient 3.6 mm Hg at 81 bpm. There was mild mitral regurgitation. The aortic valve appeared calcified with normal opening and mild regurgitation. The LV was normal in size and wall thickness. There were no segmental wall motion abnormalities and overall systolic function was normal; LVEF 65%. The RA appeared normal. The RV appeared normal in size and function. The tricuspid and pulmonic valves appeared normal, both with mild regurgitation. Estimated RA pressure normal. There was no evidence of pericardial effusion [de-identified] : RHC / LHC: 6/2/21, Ostial LM 30 % stenosis. pLAD 60% stenosis in the distal third of the vessel segment, at the origin of D1. dLCX 50% stenosis in the proximal third of the vessel segment. p RCA 60 % stenosis. mRCA 60 % stenosis
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
## 2022-03-23 NOTE — REVIEW OF SYSTEMS
[Feeling Fatigued] : feeling fatigued [Under Stress] : under stress [Negative] : Heme/Lymph [Headache] : no headache [Weight Gain (___ Lbs)] : no recent weight gain [de-identified] : see HPI [de-identified] : see HPI

## 2022-03-23 NOTE — REASON FOR VISIT
[Structural Heart and Valve Disease] : structural heart and valve disease [Follow-Up - Clinic] : a clinic follow-up of [Cardiomyopathy] : cardiomyopathy [Coronary Artery Disease] : coronary artery disease [Hyperlipidemia] : hyperlipidemia [Mitral Regurgitation] : mitral regurgitation [FreeTextEntry1] : isolated elevated BP

## 2022-03-24 LAB
CREAT SPEC-SCNC: 102 MG/DL
MICROALBUMIN 24H UR DL<=1MG/L-MCNC: <1.2 MG/DL
MICROALBUMIN/CREAT 24H UR-RTO: NORMAL MG/G

## 2022-03-24 RX ORDER — ZOSTER VACCINE RECOMBINANT, ADJUVANTED 50 MCG/0.5
50 KIT INTRAMUSCULAR
Qty: 1 | Refills: 0 | Status: DISCONTINUED | OUTPATIENT
Start: 2022-01-12 | End: 2022-03-24

## 2022-03-24 NOTE — DISCUSSION/SUMMARY
[FreeTextEntry1] : \par SMOKING CESSATION:\par We all know the health risks of smoking, and most of us know that kicking the habit is the single biggest improvement to health a smoker can make. But that doesn’t make it any easier to kick the habit. Whether you’re a teen smoker or a lifetime pack-a-day smoker, quitting can be tough.\par To increase your chances of success, you need to be motivated, have social support, an understanding of what to expect, and a personal game plan. It is possible to learn how to replace your smoking habits, manage your cravings, and join the millions of people who have kicked the habit for good.\par Smoking tobacco is both a psychological habit and a physical addiction. The act of smoking is ingrained as a daily ritual and, at the same time, the nicotine from cigarettes provides a temporary, and addictive, high. Eliminating that regular fix of nicotine will cause your body to experience physical withdrawal symptoms and cravings. To successfully quit smoking, you’ll need to address both the habit and the addiction by changing your behavior and dealing with nicotine withdrawal symptoms.\par Relieving unpleasant and overwhelming feelings without cigarettes\par Managing unpleasant feelings such as stress, depression, loneliness, fear, and anxiety are some of the most common reasons why adults smoke. When you have a bad day, it can seem like your cigarettes are your only friend. Smoking can temporarily make feelings such as sadness, stress, anxiety, depression, and boredom evaporate into thin air. As much comfort as cigarettes provide, though, it’s important to remember that there are healthier (and more effective) ways to keep unpleasant feelings in check. These may include exercising, meditating, using sensory relaxation strategies, and practicing simple breathing exercises. \par For many people, an important aspect of quitting smoking is to find alternate ways to handle these difficult feelings without smoking. Even when cigarettes are no longer a part of your life, the painful and unpleasant feelings that may have prompted you to smoke in the past will still remain. So, it’s worth spending some time thinking about the different ways you intend to deal with stressful situations and the daily irritations that would normally have you reaching for a cigarette.\par Tailoring a personal game plan to your specific needs and desires can be a big help. List the reasons why you want to quit and then keep copies of the list in the places where you’d normally keep your cigarettes, such as in your jacket, purse, or car. Your reasons for quitting smoking might include:\par I will feel healthier and have more energy, whiter teeth and fresher breath.\par I will lower my risk for cancer, heart attacks, strokes, early death, cataracts, and skin wrinkling.\par I will make myself and my partner, friends, and family proud of me.\par I will no longer expose my children and others to the dangers of my second-hand smoke.\par I will have a healthier baby (If you or your partner is pregnant).\par I will have more money to spend.\par I won't have to worry: "When will I get to smoke next?"\par Questions to ask yourself\par To successfully detach from smoking, you will need to identify and address your smoking habits, the true nature of your dependency, and the techniques that work for you. These types of questions can help:\par Do you feel the need to smoke at every meal?\par Are you more of a social smoker?\par Is it a very bad addiction (more than a pack a day)? Or would a simple nicotine patch do the job?\par Is your cigarette smoking linked to other addictions, such as alcohol or gambling?\par Are you open to hypnotherapy and/or acupuncture?\par Are you someone who is open to talking about your addiction with a therapist or counselor?\par Are you interested in getting into a fitness program?\par Take the time to think of what kind of smoker you are, which moments of your life call for a cigarette, and why. This will help you to identify which tips, techniques or therapies may be most beneficial for you. \par Start your stop smoking plan with START\par S = Set a quit date.\par T = Tell family, friends, and co-workers that you plan to quit.\par A = Anticipate and plan for the challenges you'll face while quitting.\par R = Remove cigarettes and other tobacco products from your home, car, and work.\par T = Talk to your doctor about getting help to quit.\par \par After quitting, you may feel dizzy, restless, or even have strong headaches because you’re lacking the immediate release of sugar that comes from nicotine. You may also have a bigger appetite. These sugar-related cravings should only last a few days until your body adjusts so keep your sugar levels a bit higher than usual on those days by drinking plenty of juice (unless you’re a diabetic). It will help prevent the craving symptoms and help your body re-adjust back to normal.\par Tips for managing other cigarette cravings\par Cravings associated with meals\par For some smokers, ending a meal means lighting up, and the prospect of giving that up may appear daunting. TIP: replace that moment after a meal with something such as a piece of fruit, a (healthy) dessert, a square of chocolate, or a stick of gum.\par \par Alcohol and cigarettes\par Many people have a habit of smoking when they have an alcoholic drink. TIP: try non-alcoholic drinks, or try drinking only in bars, restaurant-bars, or friends’ houses where smoking inside is prohibited. Or try snacking on nuts and chips, or chewing on a straw or cocktail stick.\par \par Cravings associated with social smoking\par When friends, family, and co-workers smoke around you, it is doubly difficult to quit or avoid relapse. TIP: Your social circles need to know that you are changing your habits so talk about your decision to quit. Let them know they won’t be able to smoke when you’re in the car with them or taking a coffee break together. \par \par In your workplace, don’t take all your coffee breaks with smokers only, do something else instead, or find non-smokers to have your breaks with.\par Additional tips to deal with cravings and withdrawal symptoms\par Stay active: Keep yourself distracted and occupied, go for walks.\par Keep your hands/fingers busy: Squeeze balls, pencils, or paper clips are good substitutes to satisfy that need for tactile stimulation.\par Keep your mind busy: Read a book or magazine, listen to some music you love.\par Find an oral substitute: Keep other things around to pop in your mouth when you’re craving a cigarette.\par Good choices include mints, hard candy, carrot or celery sticks, gum, and sunflower seeds.\par Drink lots of water: Flushing toxins from your body minimizes withdrawal symptoms and helps cravings pass faster.\par Look for new ways to relax and to cope with depression or anxiety: There are a lot of ways to improve your mood without smoking. \par \par Finding the resources and support to quit smoking\par There are many different methods that have successfully helped people to quit smoking, including:\par Quitting smoking cold turkey.\par Systematically decreasing the number of cigarettes you smoke.\par Reducing your intake of nicotine gradually over time.\par Using nicotine replacement therapy or non-nicotine medications to reduce withdrawal symptoms.\par Utilizing nicotine support groups.\par Trying hypnosis, acupuncture, or counseling using cognitive behavioral techniques.\par You may be successful with the first method you try. More likely, you’ll have to try a number of different methods or a combination of treatments to find the ones that work best for you.\par \par Medication therapy\par Smoking cessation medications can ease withdrawal symptoms and reduce cravings, and are most effective when used as part of a comprehensive stop smoking program monitored by your physician. Talk to your doctor about your options and whether an anti-smoking medication is right for you. U.S. Food and Drug Administration (FDA) approved options are: \par \par Nicotine Replacement Therapy\par Nicotine replacement therapy involves "replacing" cigarettes with other nicotine substitutes, such as nicotine gum or a nicotine patch. It works by delivering small and steady doses of nicotine into the body to relieve some of the withdrawal symptoms without the tars and poisonous gases found in cigarettes. This type of treatment helps smokers focus on breaking their psychological addiction and makes it easier to concentrate on learning new behaviors and coping skills.\par \par Non-Nicotine Medication\par These medications help you stop smoking by reducing cravings and withdrawal symptoms without the use of nicotine. Medications such as bupropion (Zyban) and varenicline (Chantix) are intended for short-term use only.\par \par Non-medication therapies\par There are several things you can do to stop smoking that don’t involve nicotine replacement therapy or prescription medications:\par Hypnosis\par A popular option that has produced good results. Forget anything you may have seen from stage hypnotists, hypnosis works by getting you into a deeply relaxed state where you are open to suggestions that strengthen your resolve to quit smoking and increase your negative feelings toward cigarettes. Ask your doctor to recommend a qualified smoking cessation hypnotherapist in your area or refer to the American Society of Clinical Hypnosis (ASCH) for guidelines on selecting a qualified professional.\par Acupuncture\par One of the oldest known medical techniques, acupuncture is believed to work by triggering the release of endorphins (natural pain relievers) that allow the body to relax. As a smoking cessation aid, acupuncture can be helpful in managing smoking withdrawal symptoms. Ask your doctor for a referral or search for a local practitioner at the American Association of Acupuncture and [place] [employer] (AAAOM).\par Behavioral Therapy\par Nicotine addiction is related to the habitual behaviors (the “rituals”) involved in smoking. Behavior therapy focuses on learning new coping skills and breaking those habits. The American Lung Association offers a free online smoking cessation program that focuses on behavioral change. To find a local behavioral therapist, check with your doctor or search at the Association for Behavioral and Cognitive Therapies (ABCT).\par Motivational Therapies\par Self-help books and websites can provide a number of ways to motivate yourself to quit smoking. One well known example is calculating the monetary savings. Some people have been able to find the motivation to quit just by calculating how much money they will save after they quit. It may be enough to pay for a summer vacation.\par \par [facility]\par [address]\[place] [address]\par ([phone]\par [email or web address]\par \par \par WEIGHT GOALS:\par Category				BMI range - kg/m2	BMI Prime\par Very severely underweight		less than 15		less than 0.60	\par Severely underweight			from 15.0 to 16.0		from 0.60 to 0.64	\par Underweight				from 16.0 to 18.5		from 0.64 to 0.74	\par Normal (healthy weight)			from 18.5 to 25		from 0.74 to 1.0	\par Overweight				from 25 to 30		from 1.0 to 1.2	\par Obese Class I (Moderately obese)		from 30 to 35		from 1.2 to 1.4	\par Obese Class II (Severely obese)		from 35 to 40		from 1.4 to 1.6	\par Obese Class III (Very severely obese)	over 40	over 1.6				\par \par Your current weight is 100 lbs (99 lbs on 11/2/21; 91 lbs on 2/23/21; 98 lbs on 8/12/21; 100 lbs on 2/5/2020; 105 lbs on 7/31/19; 85 lbs on 1/16/19; 81 lbs on 6/21/18; 85 lbs on 1/9/18). Given current weight and height 5'4", your calculated body mass index (BMI) is 17.2 kg/sqm. Normal BMI is 18.5-25 kg/sqm. Thus current weight is in the underweight category. Abdominal waist circumference is measured at the level of the umbilicus and is thus not a pants waist measurement. Your current abdominal waist circumference is 33.75 in (31 in on 2/24/21). Normal abdominal waist circumference is < 32 inches for women and < 37 inches for men.

## 2022-03-24 NOTE — ADDENDUM
[FreeTextEntry1] : I spent 60 minutes face to face time with the patient  from 10:00 to 11:00 on 3/23/22. Thirty minutes were devoted to patient counseling with emphasis on maintenance smoking cessation (see Narrative section).
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
## 2022-03-24 NOTE — COUNSELING
[Cessation strategies including cessation program discussed] : Cessation strategies including cessation program discussed [Use of nicotine replacement therapies and other medications discussed] : Use of nicotine replacement therapies and other medications discussed [Encouraged to pick a quit date and identify support needed to quit] : Encouraged to pick a quit date and identify support needed to quit [Smoking Cessation Program Referral] : Smoking Cessation Program Referral  [No] : Not willing to quit smoking [FreeTextEntry3] : 15

## 2022-04-13 ENCOUNTER — APPOINTMENT (OUTPATIENT)
Dept: PAIN MANAGEMENT | Facility: CLINIC | Age: 67
End: 2022-04-13
Payer: MEDICARE

## 2022-04-13 VITALS
SYSTOLIC BLOOD PRESSURE: 145 MMHG | HEART RATE: 71 BPM | DIASTOLIC BLOOD PRESSURE: 80 MMHG | HEIGHT: 64 IN | BODY MASS INDEX: 17.42 KG/M2 | WEIGHT: 102 LBS

## 2022-04-13 PROCEDURE — 99213 OFFICE O/P EST LOW 20 MIN: CPT

## 2022-04-13 NOTE — HISTORY OF PRESENT ILLNESS
[FreeTextEntry1] : istop#[id number]\par pt returns today for  pain management follow up appt .\par Continues to have chronic left foot pain - worsened by cold, wet , weather. Also has migraines . \par Denies any new symptoms. \par Pt has had some seizure and has had f/u with Dr Hernandez.  \par  [____ / 10] : [unfilled]/10 [] : No

## 2022-04-28 ENCOUNTER — APPOINTMENT (OUTPATIENT)
Dept: INTERNAL MEDICINE | Facility: CLINIC | Age: 67
End: 2022-04-28

## 2022-05-02 ENCOUNTER — NON-APPOINTMENT (OUTPATIENT)
Age: 67
End: 2022-05-02

## 2022-05-02 RX ORDER — CARBAMAZEPINE 200 MG/1
200 CAPSULE, EXTENDED RELEASE ORAL
Qty: 60 | Refills: 5 | Status: DISCONTINUED | COMMUNITY
Start: 2022-03-21 | End: 2022-05-02

## 2022-05-06 ENCOUNTER — OUTPATIENT (OUTPATIENT)
Dept: OUTPATIENT SERVICES | Facility: HOSPITAL | Age: 67
LOS: 1 days | End: 2022-05-06
Payer: MEDICARE

## 2022-05-06 DIAGNOSIS — Z90.89 ACQUIRED ABSENCE OF OTHER ORGANS: Chronic | ICD-10-CM

## 2022-05-06 DIAGNOSIS — I34.0 NONRHEUMATIC MITRAL (VALVE) INSUFFICIENCY: ICD-10-CM

## 2022-05-06 DIAGNOSIS — Z94.5 SKIN TRANSPLANT STATUS: Chronic | ICD-10-CM

## 2022-05-09 ENCOUNTER — APPOINTMENT (OUTPATIENT)
Dept: CARDIOTHORACIC SURGERY | Facility: CLINIC | Age: 67
End: 2022-05-09
Payer: MEDICARE

## 2022-05-09 VITALS
DIASTOLIC BLOOD PRESSURE: 76 MMHG | TEMPERATURE: 97.6 F | SYSTOLIC BLOOD PRESSURE: 127 MMHG | HEIGHT: 64 IN | BODY MASS INDEX: 17.42 KG/M2 | WEIGHT: 102 LBS | OXYGEN SATURATION: 96 % | RESPIRATION RATE: 16 BRPM | HEART RATE: 73 BPM

## 2022-05-09 PROCEDURE — 93306 TTE W/DOPPLER COMPLETE: CPT

## 2022-05-09 PROCEDURE — 99214 OFFICE O/P EST MOD 30 MIN: CPT

## 2022-05-09 PROCEDURE — 93306 TTE W/DOPPLER COMPLETE: CPT | Mod: 26

## 2022-05-09 NOTE — DISCUSSION/SUMMARY
[FreeTextEntry1] : Misty is 6+ months following her MV RAND and returns for her 6-month clinical trial follow up visit. She is enrolled in the [facility]ASP IID study and underwent MV RAND with a DAGO PRECISION ACE device (on A2-P2). She had a TTE today which we reviewed in detail--her DAGO device is well seated with trace MR. She is doing well since her procedure and reports no cardiac symptoms at this time (NYHA I). I encouraged smoking cessation. Her BP is uncharacteristically above goal today and will continue to be monitored. She will continue to follow up closely with Dr Jesus for her cardiac care. I will see her in 6 months for her 1 year CLASP IID study follow up visit.

## 2022-05-09 NOTE — PHYSICAL EXAM
[Well Developed] : well developed [No Acute Distress] : no acute distress [Normal Conjunctiva] : normal conjunctiva [Normal Venous Pressure] : normal venous pressure [No Carotid Bruit] : no carotid bruit [Normal S1, S2] : normal S1, S2 [No Murmur] : no murmur [No Rub] : no rub [Soft] : abdomen soft [Non Tender] : non-tender [No Edema] : no edema [No Cyanosis] : no cyanosis [No Clubbing] : no clubbing [Moves all extremities] : moves all extremities [Normal Speech] : normal speech [Normal] : alert and oriented, normal memory [de-identified] : Thin female [de-identified] : Scattered rhonchi at the bases bilaterally

## 2022-05-09 NOTE — REASON FOR VISIT
[Structural Heart and Valve Disease] : structural heart and valve disease [FreeTextEntry3] : Dr. Jesus

## 2022-05-09 NOTE — CARDIOLOGY SUMMARY
[de-identified] : March: NSR 84 [de-identified] : 10/19/21, mitral RAND is seen with calcified mitral chordae. Mean mitral gradient 3.6 mm Hg at 81 bpm. There was mild mitral regurgitation. The aortic valve appeared calcified with normal opening and mild regurgitation. The LV was normal in size and wall thickness. There were no segmental wall motion abnormalities and overall systolic function was normal; LVEF 65%. The RA appeared normal. The RV appeared normal in size and function. The tricuspid and pulmonic valves appeared normal, both with mild regurgitation. Estimated RA pressure normal. There was no evidence of pericardial effusion \par  [de-identified] : RHC / LHC: 6/2/21, Ostial LM 30 % stenosis. pLAD 60% stenosis in the distal third of the vessel segment, at the origin of D1. dLCX 50% stenosis in the proximal third of the vessel segment. p RCA 60 % stenosis. mRCA 60 % stenosis \par

## 2022-05-09 NOTE — HISTORY OF PRESENT ILLNESS
[FreeTextEntry1] : Ms. Tello is here today for six month follow up s/p Mitral RAND as part of the [facility] IID study, implanted with MitraClip on 10/18/21. Her postoperative course was uneventful and she was discharged home on POD #2. \par \par \par To review, she has a PMH of non-obstructive CAD, HTN, HLD, MR, PVCs, COPD, DJD, chronic migraine, osteoporosis, breast cancer (stage 1 breast Ca 1998, treated with CMF, lumpectomy and radiation, started on tamoxifen 1999- 2004 then Femara 10/2004 - 11/2009) , and seizure disorder since childhood. She is a current smoker (1/2ppd).\par \par She notes "I am doing very good" since her RAND in October 2021. She has gained a few pounds since the procedure (from 92 to 102) about which she is quite pleased. She thinks the weight loss she had experienced was secondary to her MR. She reports no fatigue, dyspnea, angina, or syncope. As far as cardiac medications, she is only on Crestor. Her epilepsy medications were recently adjusted. She looks well. She is very pleased with how she is doing. She is down to less a half a pack a day smoking, which she feels is an improvement for her.

## 2022-05-12 ENCOUNTER — APPOINTMENT (OUTPATIENT)
Dept: PAIN MANAGEMENT | Facility: CLINIC | Age: 67
End: 2022-05-12
Payer: MEDICARE

## 2022-05-12 VITALS
BODY MASS INDEX: 17.42 KG/M2 | DIASTOLIC BLOOD PRESSURE: 87 MMHG | WEIGHT: 102 LBS | SYSTOLIC BLOOD PRESSURE: 155 MMHG | HEART RATE: 88 BPM | HEIGHT: 64 IN

## 2022-05-12 PROCEDURE — 99214 OFFICE O/P EST MOD 30 MIN: CPT

## 2022-05-12 NOTE — ASSESSMENT
[FreeTextEntry1] : Consider Emgality or Nurtec QOD at next appt \par Consider decrease on Vicodin dose ( last decreased December 2021).\par Dr Murphy on site , billed incident to service. \par We will continue to monitor ISTOP and for signs of aberrant behavior.  [Opioids] : Patient was explained in detail about pain control by using opioids. Patient has signed and fully understands our guidelines for medication and drug screening.  Patient understands the side effects of opioids, including, but not limited to, drug tolerance, dependence, potential for addiction. This class of drugs is habit-forming and ELIJAH regulated. The sedative effects of opioids can be potentiated by taking alcohol or any sleeping pills, along with opioids. The decision to drive is patient’s responsibility, as opioids can affect his/her driving ability and ability to concentrate. The long-term place is not clear, however, patient understands that once the pain control optimizes, the goal will be to wean off the opioids. All the issues regarding opioid treatment have been addressed satisfactorily.

## 2022-05-12 NOTE — HISTORY OF PRESENT ILLNESS
[FreeTextEntry1] : ISTOP#[id number]\par 66 year old woman with a hx of chronic migraine and left foot pain. Pain is worse with wet weather and weather changes. \par Pt also has a hx of HTN, epilepsy, CAD, severe tricuspid disease - s/p replacement , breast cancer. [Headache] : headache [Phonophobia] : phonophobia [___ Times Per Week] : [unfilled] times each week [____ / 10] : [unfilled]/10 [] : No

## 2022-05-12 NOTE — PHYSICAL EXAM
[General Appearance - Alert] : alert [General Appearance - Well-Appearing] : healthy appearing [Oriented To Time, Place, And Person] : oriented to person, place, and time [Affect] : the affect was normal [Mood] : the mood was normal [Cranial Nerves Facial (VII)] : face symmetrical [Cranial Nerves Accessory (XI - Cranial And Spinal)] : head turning and shoulder shrug symmetric [Cranial Nerves Hypoglossal (XII)] : there was no tongue deviation with protrusion [Motor Strength] : muscle strength was normal in all four extremities [Sclera] : the sclera and conjunctiva were normal [PERRL With Normal Accommodation] : pupils were equal in size, round, reactive to light, with normal accommodation [Extraocular Movements] : extraocular movements were intact [] : no respiratory distress [Edema] : there was no peripheral edema

## 2022-05-19 LAB
ALBUMIN SERPL ELPH-MCNC: 4.6 G/DL
ALP BLD-CCNC: 130 U/L
ALT SERPL-CCNC: 9 U/L
ANION GAP SERPL CALC-SCNC: 16 MMOL/L
AST SERPL-CCNC: 15 U/L
BASOPHILS # BLD AUTO: 0.04 K/UL
BASOPHILS NFR BLD AUTO: 0.5 %
BILIRUB SERPL-MCNC: 0.3 MG/DL
BUN SERPL-MCNC: 12 MG/DL
CALCIUM SERPL-MCNC: 9.9 MG/DL
CHLORIDE SERPL-SCNC: 102 MMOL/L
CO2 SERPL-SCNC: 23 MMOL/L
CREAT SERPL-MCNC: 0.57 MG/DL
EGFR: 101 ML/MIN/1.73M2
EOSINOPHIL # BLD AUTO: 0.22 K/UL
EOSINOPHIL NFR BLD AUTO: 2.5 %
GLUCOSE SERPL-MCNC: 107 MG/DL
HCT VFR BLD CALC: 41.4 %
HGB BLD-MCNC: 13.4 G/DL
IMM GRANULOCYTES NFR BLD AUTO: 0.6 %
LYMPHOCYTES # BLD AUTO: 2.5 K/UL
LYMPHOCYTES NFR BLD AUTO: 28.8 %
MAN DIFF?: NORMAL
MCHC RBC-ENTMCNC: 32.4 GM/DL
MCHC RBC-ENTMCNC: 32.7 PG
MCV RBC AUTO: 101 FL
MONOCYTES # BLD AUTO: 0.6 K/UL
MONOCYTES NFR BLD AUTO: 6.9 %
NEUTROPHILS # BLD AUTO: 5.27 K/UL
NEUTROPHILS NFR BLD AUTO: 60.7 %
PLATELET # BLD AUTO: 245 K/UL
POTASSIUM SERPL-SCNC: 4 MMOL/L
PROT SERPL-MCNC: 8.1 G/DL
RBC # BLD: 4.1 M/UL
RBC # FLD: 13.3 %
SODIUM SERPL-SCNC: 141 MMOL/L
WBC # FLD AUTO: 8.68 K/UL

## 2022-06-07 ENCOUNTER — APPOINTMENT (OUTPATIENT)
Dept: NEUROLOGY | Facility: CLINIC | Age: 67
End: 2022-06-07
Payer: MEDICARE

## 2022-06-07 VITALS
BODY MASS INDEX: 17.42 KG/M2 | HEIGHT: 64 IN | HEART RATE: 70 BPM | SYSTOLIC BLOOD PRESSURE: 141 MMHG | WEIGHT: 102 LBS | DIASTOLIC BLOOD PRESSURE: 82 MMHG

## 2022-06-07 PROCEDURE — 99214 OFFICE O/P EST MOD 30 MIN: CPT

## 2022-06-07 NOTE — DISCUSSION/SUMMARY
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  We discussed the possibility of epilepsy sx in detail.  She had neuropsych testing which showed diffuse cerebral dysfunction, right greater than left.\par \par Plan:\par - Taking Clobazam 40mg bid and doing better\par - Discussed risks of CBZ in osteoporosis, but risk of stopping medication high in increasing her seizures.  She knows risk of fracture at this time and is being treated by her PMD. Did poorly on Carbatrol and back on CBZ.\par - encouraged 3 servings Ca+ in diet and low weight bearing exercises\par - reviewed seizure triggers\par - f/u CBZ  (CBC,CMP) next visit. Done PMD.\par - good RX to [facility] for meds - much cheaper - looked with her\par -follow up in 2-3 months.\par \par Total time 32 min\par \par . [Medically Refractory (seizure within the last year)] : Medically Refractory (seizure within the last year) [Complex Partial] : complex partial [Symptomatic] : symptomatic [Risks Associated with Driving/[place] Law] : As per my usual protocol, the patient was advised in regards to risks and driving privileges associated with the [place] Guidelines.  [Safety Recommendations] : The patient was advised in regards to the risk of seizures and general seizure safety recommendations including not to be bathing alone, climbing to high places and operating heavy machinery. [Compliance with Medications] : The importance of compliance with medications was reinforced. [Bone Health Education] : Patient was educated in regards to bone health and an increased risk of osteoporosis in patients with epilepsy. [Sleep Hygiene/Sleep Disruption Risks] : Sleep hygiene and the risks of sleep disruption were discussed. [Surgical Options/Risks/Benefits] : Surgical options/risks/benefits for intractable epilepsy were discussed. [Risk of Death] : Risk of death associated with seizures / SUDEP was discussed. [Opioids] : Patient was explained in detail about pain control by using opioids. Patient has signed and fully understands our guidelines for medication and drug screening.  Patient understands the side effects of opioids, including, but not limited to, drug tolerance, dependence, potential for addiction. This class of drugs is habit-forming and ELIJAH regulated. The sedative effects of opioids can be potentiated by taking alcohol or any sleeping pills, along with opioids. The decision to drive is patient’s responsibility, as opioids can affect his/her driving ability and ability to concentrate. The long-term place is not clear, however, patient understands that once the pain control optimizes, the goal will be to wean off the opioids. All the issues regarding opioid treatment have been addressed satisfactorily.
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
## 2022-06-22 ENCOUNTER — APPOINTMENT (OUTPATIENT)
Dept: ULTRASOUND IMAGING | Facility: IMAGING CENTER | Age: 67
End: 2022-06-22
Payer: MEDICARE

## 2022-06-22 ENCOUNTER — RESULT REVIEW (OUTPATIENT)
Age: 67
End: 2022-06-22

## 2022-06-22 ENCOUNTER — APPOINTMENT (OUTPATIENT)
Dept: MAMMOGRAPHY | Facility: IMAGING CENTER | Age: 67
End: 2022-06-22
Payer: MEDICARE

## 2022-06-22 ENCOUNTER — OUTPATIENT (OUTPATIENT)
Dept: OUTPATIENT SERVICES | Facility: HOSPITAL | Age: 67
LOS: 1 days | End: 2022-06-22
Payer: MEDICARE

## 2022-06-22 DIAGNOSIS — Z90.89 ACQUIRED ABSENCE OF OTHER ORGANS: Chronic | ICD-10-CM

## 2022-06-22 DIAGNOSIS — Z00.8 ENCOUNTER FOR OTHER GENERAL EXAMINATION: ICD-10-CM

## 2022-06-22 DIAGNOSIS — Z94.5 SKIN TRANSPLANT STATUS: Chronic | ICD-10-CM

## 2022-06-22 PROCEDURE — 76641 ULTRASOUND BREAST COMPLETE: CPT | Mod: 26,50

## 2022-06-22 PROCEDURE — 77063 BREAST TOMOSYNTHESIS BI: CPT | Mod: 26

## 2022-06-22 PROCEDURE — 76641 ULTRASOUND BREAST COMPLETE: CPT

## 2022-06-22 PROCEDURE — 77067 SCR MAMMO BI INCL CAD: CPT

## 2022-06-22 PROCEDURE — 77067 SCR MAMMO BI INCL CAD: CPT | Mod: 26

## 2022-06-22 PROCEDURE — 77063 BREAST TOMOSYNTHESIS BI: CPT

## 2022-06-23 ENCOUNTER — APPOINTMENT (OUTPATIENT)
Dept: INTERNAL MEDICINE | Facility: CLINIC | Age: 67
End: 2022-06-23
Payer: MEDICARE

## 2022-06-23 VITALS
BODY MASS INDEX: 17.42 KG/M2 | WEIGHT: 102 LBS | SYSTOLIC BLOOD PRESSURE: 118 MMHG | OXYGEN SATURATION: 96 % | TEMPERATURE: 98 F | HEIGHT: 64 IN | DIASTOLIC BLOOD PRESSURE: 76 MMHG | HEART RATE: 67 BPM

## 2022-06-23 PROCEDURE — G0009: CPT

## 2022-06-23 PROCEDURE — 90715 TDAP VACCINE 7 YRS/> IM: CPT

## 2022-06-23 PROCEDURE — G0439: CPT

## 2022-06-23 PROCEDURE — 90472 IMMUNIZATION ADMIN EACH ADD: CPT

## 2022-06-23 PROCEDURE — 90732 PPSV23 VACC 2 YRS+ SUBQ/IM: CPT

## 2022-06-23 NOTE — ASSESSMENT
[FreeTextEntry1] : 1) Wellness check \par \par diet / exercise discussed \par labs reviewed \par CT lungs ordered \par mammo - UTD \par F/U GI \par refuses any intervention for  OP \par Td / PNEUMOVAX

## 2022-06-23 NOTE — HEALTH RISK ASSESSMENT
[Good] : ~his/her~  mood as  good [Current] : Current [20 or more] : 20 or more [No] : No [No falls in past year] : Patient reported no falls in the past year [Assistive Device] : Patient uses an assistive device [0] : 2) Feeling down, depressed, or hopeless: Not at all (0) [de-identified] : none [de-identified] : regular  [de-identified] : CANE [2] : 2 [LowDoseCTScan] : 6/21 [Patient reported mammogram was normal] : Patient reported mammogram was normal [Change in mental status noted] : No change in mental status noted [Transportation] : transportation [With Family] : lives with family [Unemployed] : unemployed [] :  [Sexually Active] : sexually active [Feels Safe at Home] : Feels safe at home [Fully functional (bathing, dressing, toileting, transferring, walking, feeding)] : Fully functional (bathing, dressing, toileting, transferring, walking, feeding) [Reports changes in hearing] : Reports no changes in hearing [Reports changes in vision] : Reports no changes in vision [Reports changes in dental health] : Reports no changes in dental health [Smoke Detector] : smoke detector [Carbon Monoxide Detector] : carbon monoxide detector [Safety elements used in home] : safety elements used in home [Seat Belt] :  uses seat belt [MammogramDate] : 2022 [BoneDensityDate] : 2017 [BoneDensityComments] : refuses to take meds  [ColonoscopyDate] : 2017 [ColonoscopyComments] : 5 yr f/u advised  [de-identified] : needs help w/ transport / cooking  [Name: ___] : Health Care Proxy's Name: [unfilled]  [Relationship: ___] : Relationship: [unfilled] [AdvancecareDate] : 6/23/22

## 2022-07-15 ENCOUNTER — APPOINTMENT (OUTPATIENT)
Dept: PAIN MANAGEMENT | Facility: CLINIC | Age: 67
End: 2022-07-15

## 2022-08-04 ENCOUNTER — APPOINTMENT (OUTPATIENT)
Dept: PAIN MANAGEMENT | Facility: CLINIC | Age: 67
End: 2022-08-04

## 2022-08-04 VITALS
BODY MASS INDEX: 17.24 KG/M2 | HEIGHT: 64 IN | SYSTOLIC BLOOD PRESSURE: 134 MMHG | WEIGHT: 101 LBS | DIASTOLIC BLOOD PRESSURE: 78 MMHG | HEART RATE: 78 BPM

## 2022-08-04 PROCEDURE — 99213 OFFICE O/P EST LOW 20 MIN: CPT

## 2022-08-04 NOTE — ASSESSMENT
[FreeTextEntry1] : 65 y/o F with chronic pain and Migraines \par \par Discussed in detail the nature of chronic pain as well as chronic opioid use for non-malignant pain. High dose of opioid are not recommended and can potentially lead to hyperalgesia, tolerance and addiction. Additionally, we extensively discussed the risks possible AE when taking opioids including respiratory suppression and death. We have discussed the importance of exploring nonopioid pain management.\par \par Will attempt to taper down hydrocodone gradually \par Decreased hydrocodone 10/325 3-4x/day qty 110 to 100\par Trial of Nurtec \par I-Stop reviewed,  reference #: [id number]\par No signs of aberrant behavior and will continue to monitor for signs of toxicity.\par Reminded to continue to avoid alcohol.\par \par I am seeing ANTIONETTE GONZALEZ as incident to service. Dr. Murphy is present in the office suite immediately available and able to provide assistance and direction throughout the time the service was performed.\par \par  [Opioids] : Patient was explained in detail about pain control by using opioids. Patient has signed and fully understands our guidelines for medication and drug screening.  Patient understands the side effects of opioids, including, but not limited to, drug tolerance, dependence, potential for addiction. This class of drugs is habit-forming and ELIJAH regulated. The sedative effects of opioids can be potentiated by taking alcohol or any sleeping pills, along with opioids. The decision to drive is patient’s responsibility, as opioids can affect his/her driving ability and ability to concentrate. The long-term place is not clear, however, patient understands that once the pain control optimizes, the goal will be to wean off the opioids. All the issues regarding opioid treatment have been addressed satisfactorily.

## 2022-08-04 NOTE — HISTORY OF PRESENT ILLNESS
[FreeTextEntry1] : 67 y/o F Pmhx HTN, HLD, CAD , severe MR s/p recent Tricuspid valve replacement , seizures, breast CA, migraines who presents today for follow up for chronic pain. Migraines are occurring every other day described as a sharp pain on the right side of head 10/10 that can last hours to days associated with phonophobia.  \par \par  Chronic pain in her left foot unchanged since last visit that comes and goes 7/10 on average but up to 10/10 exacerbated by changes in weather.  \par \par Current meds: hydrocodone 10/325mg 3-4x/day; clobazam, carbamazepine 200 mg TID, MM ( not effective and last used 6 months ago) \par

## 2022-09-01 NOTE — PHYSICAL EXAM
Impression: Primary open-angle glaucoma, mild stage, bilateral: H40.1131. No FHx No Sulfa allergies 24-2 HVF (09/01/22) OD: scattered misses possible early NS
   OS: scattered misses RNFL OCT (09/01/22) OD: Full OS: Full Pachs (09/01/22) OD:
   OS: Plan: Discussed findings,
IOPs controlled Continue Latanoprost QHS OU

RTC 6mo for CE with RNFL OCT or sooner if any vision changes [Normal] : supple, no neck mass and thyroid not enlarged [Normal Neck Lymph Nodes] : normal neck lymph nodes  [Normal Groin Lymph Nodes] : normal groin lymph nodes [Normal] : oriented to person, place and time, with appropriate affect [FreeTextEntry1] : \par \par \par \par  [de-identified] : Normal S1, S2. Regular rate and rhythm\par \par  [de-identified] : retracted lumpectomy site at 12:00 [de-identified] : Clear breath sounds bilaterally, normal respiratory effort\par \par

## 2022-09-23 ENCOUNTER — APPOINTMENT (OUTPATIENT)
Dept: PAIN MANAGEMENT | Facility: CLINIC | Age: 67
End: 2022-09-23

## 2022-09-23 VITALS
WEIGHT: 101 LBS | HEART RATE: 111 BPM | HEIGHT: 64 IN | DIASTOLIC BLOOD PRESSURE: 81 MMHG | SYSTOLIC BLOOD PRESSURE: 128 MMHG | BODY MASS INDEX: 17.24 KG/M2

## 2022-09-23 PROCEDURE — 99214 OFFICE O/P EST MOD 30 MIN: CPT

## 2022-09-23 NOTE — HISTORY OF PRESENT ILLNESS
[FreeTextEntry1] : 65 y/o F Pmhx HTN, HLD, CAD , severe MR s/p recent Tricuspid valve replacement , seizures, breast CA, migraines who presents today for follow up for chronic pain. She is now having headaches 2-3x/week described as a sharp pain on the right side of head 10/10 that can last hours to days associated with phonophobia.   Chronic pain in her left foot unchanged since last visit that comes and goes 67/10 on average but up to 9-10/10 exacerbated by changes in weather.  \par \par Prior meds include: Venlafaxine, Botox AE, MM \par Current meds: hydrocodone 10/325mg 3-4x/day; clobazam, carbamazepine 200 mg TID, MM ( not effective and last used 6 months ago) \par

## 2022-09-23 NOTE — ASSESSMENT
[Opioids] : Patient was explained in detail about pain control by using opioids. Patient has signed and fully understands our guidelines for medication and drug screening.  Patient understands the side effects of opioids, including, but not limited to, drug tolerance, dependence, potential for addiction. This class of drugs is habit-forming and ELIJAH regulated. The sedative effects of opioids can be potentiated by taking alcohol or any sleeping pills, along with opioids. The decision to drive is patient’s responsibility, as opioids can affect his/her driving ability and ability to concentrate. The long-term place is not clear, however, patient understands that once the pain control optimizes, the goal will be to wean off the opioids. All the issues regarding opioid treatment have been addressed satisfactorily.  [FreeTextEntry1] : 65 y/o F with chronic pain and Migraines \par \par \par \par Narcan prescribed and instructed on proper use. \par hydrocodone 10/325 3-4x/day qty 100 and will taper to 7.5/325 mg 4x/day qty 120 and will continue to try to taper further. \par Will consider other non opioid meds but reluctant as sensitive to multiple meds. \par Trial of Nurtec but never was able to obtain and will try to go through [employer] ([employer]) assist. \par UDs to be done today. \par I-Stop reviewed,  reference #: [id number]\par No signs of aberrant behavior and will continue to monitor for signs of toxicity.\par Reminded to continue to avoid alcohol.\par \par I am seeing ANTIONETTE GONZALEZ as incident to service. Dr. Murphy is present in the office suite immediately available and able to provide assistance and direction throughout the time the service was performed.\par \par

## 2022-09-28 ENCOUNTER — NON-APPOINTMENT (OUTPATIENT)
Age: 67
End: 2022-09-28

## 2022-09-28 ENCOUNTER — APPOINTMENT (OUTPATIENT)
Dept: CARDIOLOGY | Facility: CLINIC | Age: 67
End: 2022-09-28
Payer: MEDICARE

## 2022-09-28 VITALS — DIASTOLIC BLOOD PRESSURE: 69 MMHG | HEART RATE: 84 BPM | OXYGEN SATURATION: 97 % | SYSTOLIC BLOOD PRESSURE: 126 MMHG

## 2022-09-28 VITALS — DIASTOLIC BLOOD PRESSURE: 65 MMHG | SYSTOLIC BLOOD PRESSURE: 110 MMHG | OXYGEN SATURATION: 98 % | HEART RATE: 80 BPM

## 2022-09-28 VITALS
OXYGEN SATURATION: 98 % | HEIGHT: 64 IN | SYSTOLIC BLOOD PRESSURE: 114 MMHG | DIASTOLIC BLOOD PRESSURE: 61 MMHG | RESPIRATION RATE: 16 BRPM | HEART RATE: 74 BPM | WEIGHT: 99 LBS | BODY MASS INDEX: 16.9 KG/M2

## 2022-09-28 DIAGNOSIS — I10 ESSENTIAL (PRIMARY) HYPERTENSION: ICD-10-CM

## 2022-09-28 PROCEDURE — 93000 ELECTROCARDIOGRAM COMPLETE: CPT

## 2022-09-28 PROCEDURE — 99407 BEHAV CHNG SMOKING > 10 MIN: CPT | Mod: 25

## 2022-09-28 PROCEDURE — 99215 OFFICE O/P EST HI 40 MIN: CPT | Mod: 25

## 2022-09-28 PROCEDURE — 93040 RHYTHM ECG WITH REPORT: CPT | Mod: 59

## 2022-09-28 NOTE — ADDENDUM
[FreeTextEntry1] : I spent 60 minutes face to face time with the patient  from 10:00 to 11:00 on 9/28/22. Thirty minutes were devoted to patient counseling with emphasis on maintenance smoking cessation (see Narrative section). Approx 15 min were devoted to smoking cessation, with literature provided regarding [facility] Smoking Cessation Program.

## 2022-09-28 NOTE — CARDIOLOGY SUMMARY
[de-identified] : 9/27/22, normal sinus rhythm at 71 bpm, with occasional ectopic ventricular beat. There was nonspecific ST depression   +   negative T-waves, noted previously. Prior ECG 3/23/22 showed normal sinus rhythm at 84 bpm, with nonspecific ST depression   +   negative T-waves, likely non-diagnostic  but cannot r/o anterior  ischemia.  ms / QTc 396 ms.  Rhythm / RR – interval analysis 9/28/22 observed  102 beats over 90 sec with mean HR 70 bpm; mean  ms (508-1234); Max/Min 242%; RR SD 71 and RR CV 8%/ There were 2 PVCs [de-identified] : 6/18/21, small moderate anteroapical and distal inferior defects that are fixed, consistent with infarct. No segmental wall motion abnormality; LVEF 61%. There was no significant change compared to prior study 3/7/17. [de-identified] : 5/9/22, ,DAGO ACE RAND on A2/P2.  The device is well seated and attached. Minimal mitral regurgitation. There is systolic dominance in the pulmonary vein. The peak/mean gradients= 9/3mmHg (HR= 66bpm). Aortic Root: Aortic Root: 3 cm. LVOT diameter: 2 cm. Aortic Valve: Minimally calcified trileaflet aortic valve. Peak transaortic valve gradient equals 6 mm Hg, mean transaortic valve gradient equals 3 mm Hg, aortic valve velocity time integral equals 22 cm, estimated aortic valve area equals 2.6 sqcm. Mild aortic regurgitation.  Peak left ventricular outflow tract gradient equals 3 mm Hg, mean gradient is equal to 1 mm Hg, LVOT velocity time integral equals 18 cm. Left Atrium: Normal left atrium.  LA volume index = 32 cc/m2. Left Ventricle: There is basal inferior and inferolateral walls are akinetic.  The left ventricular function is mildly reduced. The LVEF= 47 by biplane Bolden' method% .Normal left ventricular size. Right Heart: Normal right atrium. RA area= 9cm2, RA volume= 16ml. Normal right ventricular size and function. Normal tricuspid valve. Minimal tricuspid regurgitation. Normal pulmonic valve. Minimal pulmonic regurgitation. Pericardium / Pleura Normal pericardium with no pericardial effusion. Hemodynamic: The estimated right atrial pressure is normal. EstimatedRVSP  34 mm Hg, consistent with normal pulmonary pressures.\par  [de-identified] : RHC / LHC: 6/2/21, Ostial LM 30 % stenosis. pLAD 60% stenosis in the distal third of the vessel segment, at the origin of D1. dLCX 50% stenosis in the proximal third of the vessel segment. p RCA 60 % stenosis. mRCA 60 % stenosis

## 2022-09-28 NOTE — REVIEW OF SYSTEMS
[Under Stress] : under stress [Negative] : Heme/Lymph [Fever] : no fever [Headache] : no headache [Weight Gain (___ Lbs)] : no recent weight gain [Chills] : no chills [Feeling Fatigued] : not feeling fatigued [Weight Loss (___ Lbs)] : no recent weight loss [de-identified] : see HPI [de-identified] : see HPI
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
## 2022-09-28 NOTE — DISCUSSION/SUMMARY
[FreeTextEntry1] : \par SMOKING CESSATION:\par We all know the health risks of smoking, and most of us know that kicking the habit is the single biggest improvement to health a smoker can make. But that doesn’t make it any easier to kick the habit. Whether you’re a teen smoker or a lifetime pack-a-day smoker, quitting can be tough.\par To increase your chances of success, you need to be motivated, have social support, an understanding of what to expect, and a personal game plan. It is possible to learn how to replace your smoking habits, manage your cravings, and join the millions of people who have kicked the habit for good.\par Smoking tobacco is both a psychological habit and a physical addiction. The act of smoking is ingrained as a daily ritual and, at the same time, the nicotine from cigarettes provides a temporary, and addictive, high. Eliminating that regular fix of nicotine will cause your body to experience physical withdrawal symptoms and cravings. To successfully quit smoking, you’ll need to address both the habit and the addiction by changing your behavior and dealing with nicotine withdrawal symptoms.\par Relieving unpleasant and overwhelming feelings without cigarettes\par Managing unpleasant feelings such as stress, depression, loneliness, fear, and anxiety are some of the most common reasons why adults smoke. When you have a bad day, it can seem like your cigarettes are your only friend. Smoking can temporarily make feelings such as sadness, stress, anxiety, depression, and boredom evaporate into thin air. As much comfort as cigarettes provide, though, it’s important to remember that there are healthier (and more effective) ways to keep unpleasant feelings in check. These may include exercising, meditating, using sensory relaxation strategies, and practicing simple breathing exercises. \par For many people, an important aspect of quitting smoking is to find alternate ways to handle these difficult feelings without smoking. Even when cigarettes are no longer a part of your life, the painful and unpleasant feelings that may have prompted you to smoke in the past will still remain. So, it’s worth spending some time thinking about the different ways you intend to deal with stressful situations and the daily irritations that would normally have you reaching for a cigarette.\par Tailoring a personal game plan to your specific needs and desires can be a big help. List the reasons why you want to quit and then keep copies of the list in the places where you’d normally keep your cigarettes, such as in your jacket, purse, or car. Your reasons for quitting smoking might include:\par I will feel healthier and have more energy, whiter teeth and fresher breath.\par I will lower my risk for cancer, heart attacks, strokes, early death, cataracts, and skin wrinkling.\par I will make myself and my partner, friends, and family proud of me.\par I will no longer expose my children and others to the dangers of my second-hand smoke.\par I will have a healthier baby (If you or your partner is pregnant).\par I will have more money to spend.\par I won't have to worry: "When will I get to smoke next?"\par Questions to ask yourself\par To successfully detach from smoking, you will need to identify and address your smoking habits, the true nature of your dependency, and the techniques that work for you. These types of questions can help:\par Do you feel the need to smoke at every meal?\par Are you more of a social smoker?\par Is it a very bad addiction (more than a pack a day)? Or would a simple nicotine patch do the job?\par Is your cigarette smoking linked to other addictions, such as alcohol or gambling?\par Are you open to hypnotherapy and/or acupuncture?\par Are you someone who is open to talking about your addiction with a therapist or counselor?\par Are you interested in getting into a fitness program?\par Take the time to think of what kind of smoker you are, which moments of your life call for a cigarette, and why. This will help you to identify which tips, techniques or therapies may be most beneficial for you. \par Start your stop smoking plan with START\par S = Set a quit date.\par T = Tell family, friends, and co-workers that you plan to quit.\par A = Anticipate and plan for the challenges you'll face while quitting.\par R = Remove cigarettes and other tobacco products from your home, car, and work.\par T = Talk to your doctor about getting help to quit.\par \par After quitting, you may feel dizzy, restless, or even have strong headaches because you’re lacking the immediate release of sugar that comes from nicotine. You may also have a bigger appetite. These sugar-related cravings should only last a few days until your body adjusts so keep your sugar levels a bit higher than usual on those days by drinking plenty of juice (unless you’re a diabetic). It will help prevent the craving symptoms and help your body re-adjust back to normal.\par Tips for managing other cigarette cravings\par Cravings associated with meals\par For some smokers, ending a meal means lighting up, and the prospect of giving that up may appear daunting. TIP: replace that moment after a meal with something such as a piece of fruit, a (healthy) dessert, a square of chocolate, or a stick of gum.\par \par Alcohol and cigarettes\par Many people have a habit of smoking when they have an alcoholic drink. TIP: try non-alcoholic drinks, or try drinking only in bars, restaurant-bars, or friends’ houses where smoking inside is prohibited. Or try snacking on nuts and chips, or chewing on a straw or cocktail stick.\par \par Cravings associated with social smoking\par When friends, family, and co-workers smoke around you, it is doubly difficult to quit or avoid relapse. TIP: Your social circles need to know that you are changing your habits so talk about your decision to quit. Let them know they won’t be able to smoke when you’re in the car with them or taking a coffee break together. \par \par In your workplace, don’t take all your coffee breaks with smokers only, do something else instead, or find non-smokers to have your breaks with.\par Additional tips to deal with cravings and withdrawal symptoms\par Stay active: Keep yourself distracted and occupied, go for walks.\par Keep your hands/fingers busy: Squeeze balls, pencils, or paper clips are good substitutes to satisfy that need for tactile stimulation.\par Keep your mind busy: Read a book or magazine, listen to some music you love.\par Find an oral substitute: Keep other things around to pop in your mouth when you’re craving a cigarette.\par Good choices include mints, hard candy, carrot or celery sticks, gum, and sunflower seeds.\par Drink lots of water: Flushing toxins from your body minimizes withdrawal symptoms and helps cravings pass faster.\par Look for new ways to relax and to cope with depression or anxiety: There are a lot of ways to improve your mood without smoking. \par \par Finding the resources and support to quit smoking\par There are many different methods that have successfully helped people to quit smoking, including:\par Quitting smoking cold turkey.\par Systematically decreasing the number of cigarettes you smoke.\par Reducing your intake of nicotine gradually over time.\par Using nicotine replacement therapy or non-nicotine medications to reduce withdrawal symptoms.\par Utilizing nicotine support groups.\par Trying hypnosis, acupuncture, or counseling using cognitive behavioral techniques.\par You may be successful with the first method you try. More likely, you’ll have to try a number of different methods or a combination of treatments to find the ones that work best for you.\par \par Medication therapy\par Smoking cessation medications can ease withdrawal symptoms and reduce cravings, and are most effective when used as part of a comprehensive stop smoking program monitored by your physician. Talk to your doctor about your options and whether an anti-smoking medication is right for you. U.S. Food and Drug Administration (FDA) approved options are: \par \par Nicotine Replacement Therapy\par Nicotine replacement therapy involves "replacing" cigarettes with other nicotine substitutes, such as nicotine gum or a nicotine patch. It works by delivering small and steady doses of nicotine into the body to relieve some of the withdrawal symptoms without the tars and poisonous gases found in cigarettes. This type of treatment helps smokers focus on breaking their psychological addiction and makes it easier to concentrate on learning new behaviors and coping skills.\par \par Non-Nicotine Medication\par These medications help you stop smoking by reducing cravings and withdrawal symptoms without the use of nicotine. Medications such as bupropion (Zyban) and varenicline (Chantix) are intended for short-term use only.\par \par Non-medication therapies\par There are several things you can do to stop smoking that don’t involve nicotine replacement therapy or prescription medications:\par Hypnosis\par A popular option that has produced good results. Forget anything you may have seen from stage hypnotists, hypnosis works by getting you into a deeply relaxed state where you are open to suggestions that strengthen your resolve to quit smoking and increase your negative feelings toward cigarettes. Ask your doctor to recommend a qualified smoking cessation hypnotherapist in your area or refer to the American Society of Clinical Hypnosis (ASCH) for guidelines on selecting a qualified professional.\par Acupuncture\par One of the oldest known medical techniques, acupuncture is believed to work by triggering the release of endorphins (natural pain relievers) that allow the body to relax. As a smoking cessation aid, acupuncture can be helpful in managing smoking withdrawal symptoms. Ask your doctor for a referral or search for a local practitioner at the American Association of Acupuncture and [place] [facility] (AAAOM).\par Behavioral Therapy\par Nicotine addiction is related to the habitual behaviors (the “rituals”) involved in smoking. Behavior therapy focuses on learning new coping skills and breaking those habits. The American Lung Association offers a free online smoking cessation program that focuses on behavioral change. To find a local behavioral therapist, check with your doctor or search at the Association for Behavioral and Cognitive Therapies (ABCT).\par Motivational Therapies\par Self-help books and websites can provide a number of ways to motivate yourself to quit smoking. One well known example is calculating the monetary savings. Some people have been able to find the motivation to quit just by calculating how much money they will save after they quit. It may be enough to pay for a summer vacation.\par \par [facility]\par [address]\[place] [address]\par [phone]\par [email or web address]\par \par \par WEIGHT GOALS:\par Category				BMI range - kg/m2	BMI Prime\par Very severely underweight		less than 15		less than 0.60	\par Severely underweight			from 15.0 to 16.0		from 0.60 to 0.64	\par Underweight				from 16.0 to 18.5		from 0.64 to 0.74	\par Normal (healthy weight)			from 18.5 to 25		from 0.74 to 1.0	\par Overweight				from 25 to 30		from 1.0 to 1.2	\par Obese Class I (Moderately obese)		from 30 to 35		from 1.2 to 1.4	\par Obese Class II (Severely obese)		from 35 to 40		from 1.4 to 1.6	\par Obese Class III (Very severely obese)	over 40	over 1.6				\par \par Your current weight is 99 lbs (99 lbs on 11/2/21; 91 lbs on 2/23/21; 98 lbs on 8/12/21; 100 lbs on 2/5/2020; 105 lbs on 7/31/19; 85 lbs on 1/16/19; 81 lbs on 6/21/18; 85 lbs on 1/9/18). Given current weight and height 5'4", your calculated body mass index (BMI) is 17.2 kg/sqm. Normal BMI is 18.5-25 kg/sqm. Thus current weight is in the underweight category. Abdominal waist circumference is measured at the level of the umbilicus and is thus not a pants waist measurement. Your current abdominal waist circumference is 31.5" in (31 in on 2/24/21). Normal abdominal waist circumference is < 32 inches for women and < 37 inches for men.

## 2022-10-11 ENCOUNTER — APPOINTMENT (OUTPATIENT)
Dept: NEUROLOGY | Facility: CLINIC | Age: 67
End: 2022-10-11

## 2022-10-11 VITALS
DIASTOLIC BLOOD PRESSURE: 95 MMHG | HEART RATE: 80 BPM | HEIGHT: 64 IN | RESPIRATION RATE: 16 BRPM | WEIGHT: 98 LBS | BODY MASS INDEX: 16.73 KG/M2 | SYSTOLIC BLOOD PRESSURE: 157 MMHG

## 2022-10-11 PROCEDURE — 99214 OFFICE O/P EST MOD 30 MIN: CPT

## 2022-10-11 NOTE — REASON FOR VISIT
suture/staple removal [Follow-Up: _____] : a [unfilled] follow-up visit [FreeTextEntry1] : Chronic isolated lower back pain and headaches.

## 2022-10-11 NOTE — DISCUSSION/SUMMARY
[FreeTextEntry1] : 67 year old woman with complex partial refractory epilepsy on Klonopin and CBZ s/p surgery over 20 years ago with chronic headache as well.\par \par -The VEEG showed left sided seizures from the Fronto-temporal region.  We discussed the possibility of epilepsy sx in detail.  She had neuropsych testing which showed diffuse cerebral dysfunction, right greater than left.\par \par Plan:\par - Taking Clobazam 40mg bid and doing better\par - Discussed risks of CBZ in osteoporosis, but risk of stopping medication high in increasing her seizures.  She knows risk of fracture at this time and is being treated by her PMD. Did poorly on Carbatrol and back on CBZ.\par - encouraged 3 servings Ca+ in diet and low weight bearing exercises\par - reviewed seizure triggers\par - f/u CBZ  (CBC,CMP) next visit. Done PMD.\par - good RX to [facility] for meds - much cheaper - looked with her\par -follow up in 2-3 months.\par \par Total time 32 min\par \par . [Medically Refractory (seizure within the last year)] : Medically Refractory (seizure within the last year) [Complex Partial] : complex partial [Symptomatic] : symptomatic [Risks Associated with Driving/[place] Law] : As per my usual protocol, the patient was advised in regards to risks and driving privileges associated with the [place] Guidelines.  [Safety Recommendations] : The patient was advised in regards to the risk of seizures and general seizure safety recommendations including not to be bathing alone, climbing to high places and operating heavy machinery. [Compliance with Medications] : The importance of compliance with medications was reinforced. [Bone Health Education] : Patient was educated in regards to bone health and an increased risk of osteoporosis in patients with epilepsy. [Sleep Hygiene/Sleep Disruption Risks] : Sleep hygiene and the risks of sleep disruption were discussed. [Surgical Options/Risks/Benefits] : Surgical options/risks/benefits for intractable epilepsy were discussed. [Risk of Death] : Risk of death associated with seizures / SUDEP was discussed. [Opioids] : Patient was explained in detail about pain control by using opioids. Patient has signed and fully understands our guidelines for medication and drug screening.  Patient understands the side effects of opioids, including, but not limited to, drug tolerance, dependence, potential for addiction. This class of drugs is habit-forming and ELIJAH regulated. The sedative effects of opioids can be potentiated by taking alcohol or any sleeping pills, along with opioids. The decision to drive is patient’s responsibility, as opioids can affect his/her driving ability and ability to concentrate. The long-term place is not clear, however, patient understands that once the pain control optimizes, the goal will be to wean off the opioids. All the issues regarding opioid treatment have been addressed satisfactorily.
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
## 2022-11-14 ENCOUNTER — RX RENEWAL (OUTPATIENT)
Age: 67
End: 2022-11-14

## 2022-11-14 ENCOUNTER — APPOINTMENT (OUTPATIENT)
Dept: CARDIOTHORACIC SURGERY | Facility: CLINIC | Age: 67
End: 2022-11-14

## 2022-11-14 ENCOUNTER — OUTPATIENT (OUTPATIENT)
Dept: OUTPATIENT SERVICES | Facility: HOSPITAL | Age: 67
LOS: 1 days | End: 2022-11-14
Payer: MEDICARE

## 2022-11-14 VITALS
OXYGEN SATURATION: 98 % | HEIGHT: 64 IN | HEART RATE: 80 BPM | BODY MASS INDEX: 17.24 KG/M2 | DIASTOLIC BLOOD PRESSURE: 77 MMHG | WEIGHT: 101 LBS | RESPIRATION RATE: 16 BRPM | SYSTOLIC BLOOD PRESSURE: 116 MMHG

## 2022-11-14 DIAGNOSIS — I35.0 NONRHEUMATIC AORTIC (VALVE) STENOSIS: ICD-10-CM

## 2022-11-14 DIAGNOSIS — Z90.89 ACQUIRED ABSENCE OF OTHER ORGANS: Chronic | ICD-10-CM

## 2022-11-14 DIAGNOSIS — Z94.5 SKIN TRANSPLANT STATUS: Chronic | ICD-10-CM

## 2022-11-14 PROCEDURE — 99214 OFFICE O/P EST MOD 30 MIN: CPT

## 2022-11-14 PROCEDURE — 93356 MYOCRD STRAIN IMG SPCKL TRCK: CPT

## 2022-11-14 PROCEDURE — 93306 TTE W/DOPPLER COMPLETE: CPT

## 2022-11-14 PROCEDURE — 93306 TTE W/DOPPLER COMPLETE: CPT | Mod: 26

## 2022-11-14 RX ORDER — GALCANEZUMAB 120 MG/ML
120 INJECTION, SOLUTION SUBCUTANEOUS
Qty: 2 | Refills: 0 | Status: DISCONTINUED | COMMUNITY
Start: 2022-08-05 | End: 2022-11-14

## 2022-11-14 RX ORDER — GALCANEZUMAB 120 MG/ML
120 INJECTION, SOLUTION SUBCUTANEOUS
Qty: 1 | Refills: 5 | Status: DISCONTINUED | COMMUNITY
Start: 2022-08-05 | End: 2022-11-14

## 2022-11-14 NOTE — REVIEW OF SYSTEMS
[Headache] : headache [Feeling Fatigued] : feeling fatigued [Lower Ext Edema] : lower extremity edema [Diarrhea] : diarrhea [Convulsions] : convulsions [Negative] : Psychiatric [Fever] : no fever [Chills] : no chills [SOB] : no shortness of breath [Dyspnea on exertion] : not dyspnea during exertion [Chest Discomfort] : no chest discomfort [Leg Claudication] : no intermittent leg claudication [Palpitations] : no palpitations [de-identified] : H/O seizures

## 2022-11-14 NOTE — PHYSICAL EXAM
[Normal S1, S2] : normal S1, S2 [No Murmur] : no murmur [Clear Lung Fields] : clear lung fields [No Edema] : no edema [No Cyanosis] : no cyanosis [Normal] : alert and oriented, normal memory

## 2022-11-14 NOTE — HISTORY OF PRESENT ILLNESS
[FreeTextEntry1] :  presents today for her one year follow up of her Mitral RAND as part of the [facility] Clinical Trial. on 10/18/2021.She was randomized to treatment with the Adenike device. Post Procedure she had mild residual MR.\par \par She comes in today stating she feels OK. She has slowly been getting into her usual activities. She recently had a seizure that caused her to fall down the stairs, and she had minor injuries. When she performs her usual activities, she does not have any SOB, CP or pedal edema. She states she has no limitations, and she is slowly easing back into her activities to the point she can handle.\par \par NYHA Class I\par

## 2022-11-14 NOTE — DISCUSSION/SUMMARY
[FreeTextEntry1] :  is recovering well from her Mitral RAND as part of the [facility] Clinical trial using the Adenike Device. She is able to perform her usual activities, and states she has no problems with her ADL's. She had a TTE today which will be reviewed and gone over with the patient. Overall, she feels ok, and is pleased with the results from the procedure. She will continue to follow up with us as the clinical trial protocol prescribes.

## 2022-11-16 NOTE — H&P PST ADULT - OTHER CARE PROVIDERS
Dr. Arits Jesus [phone] (Cardiologist) last seen 4/2021; Dr. Claude Hernandez (Neurologist) [phone] - Last seen 9/13/21 Following Q 3 months; Dr. Dario Humphrey (Oncologist) [phone] Birth Control Pills Pregnancy And Lactation Text: This medication should be avoided if pregnant and for the first 30 days post-partum.

## 2022-11-23 ENCOUNTER — APPOINTMENT (OUTPATIENT)
Dept: PAIN MANAGEMENT | Facility: CLINIC | Age: 67
End: 2022-11-23

## 2022-11-23 VITALS
HEIGHT: 64 IN | SYSTOLIC BLOOD PRESSURE: 140 MMHG | HEART RATE: 90 BPM | WEIGHT: 101 LBS | DIASTOLIC BLOOD PRESSURE: 83 MMHG | BODY MASS INDEX: 17.24 KG/M2

## 2022-11-23 PROCEDURE — 99213 OFFICE O/P EST LOW 20 MIN: CPT

## 2022-11-23 NOTE — HISTORY OF PRESENT ILLNESS
[FreeTextEntry1] : 68 y/o F Pmhx HTN, HLD, CAD , severe MR s/p recent Tricuspid valve replacement , seizures s/p  temporal lobectomy, breast CA s/p surgery and chemo, migraines who presents today for follow up for chronic pain. Since her last visit, she reports she had a death in the family and attended the  10/28 and had a seizure and fell down 4-5 steps resulting in minor injuries with lower back pain.  She has been using heating pad and back brace which has been helpful.  In regards to headaches, she is having headaches 2-3x/week described as a sharp pain on the right side of head 10/10 that can last hours to days associated with phonophobia.   Chronic pain in her left foot unchanged since last visit that comes and goes 6-7/10 on average but up to 9-10/10 exacerbated by changes in weather.  She is past due for her hydrocodone refill for the last 7 days and has decreased the total dosage to 2-3 tabs over the last week.  \par \par Prior meds include: Corgard, Venlafaxine, Botox AE, MM, Treximet, MS , \par Current meds: hydrocodone 7.5/325mg 4x/day; clobazam, carbamazepine 200 mg TID, MM ( not effective and last used 6 months ago) \par

## 2022-11-23 NOTE — ASSESSMENT
[Opioids] : Patient was explained in detail about pain control by using opioids. Patient has signed and fully understands our guidelines for medication and drug screening.  Patient understands the side effects of opioids, including, but not limited to, drug tolerance, dependence, potential for addiction. This class of drugs is habit-forming and ELIJAH regulated. The sedative effects of opioids can be potentiated by taking alcohol or any sleeping pills, along with opioids. The decision to drive is patient’s responsibility, as opioids can affect his/her driving ability and ability to concentrate. The long-term place is not clear, however, patient understands that once the pain control optimizes, the goal will be to wean off the opioids. All the issues regarding opioid treatment have been addressed satisfactorily.  [FreeTextEntry1] : 68 y/o F with chronic pain and Migraines \par \par Narcan prescribed and instructed on proper use. \par hydrocodone 7.5/325 mg 4x/day qty 120 decreased to 105 and will continue to try to taper further. \par Will consider other non opioid meds but reluctant as sensitive to multiple meds. \par Trial of Nurtec but never was able to obtain and will try to go through [facility] assist. \par UDS September 2022 and c/w prescribed. \par I-Stop reviewed,  reference #: [id number]\par No signs of aberrant behavior and will continue to monitor for signs of toxicity.\par Reminded to continue to avoid alcohol.\par \par \par

## 2022-11-28 ENCOUNTER — NON-APPOINTMENT (OUTPATIENT)
Age: 67
End: 2022-11-28

## 2022-12-06 NOTE — REVIEW OF SYSTEMS
[SOB on Exertion] : shortness of breath during exertion [Negative] : Endocrine [Fever] : no fever [Chills] : no chills [Feeling Poorly] : not feeling poorly [Feeling Tired] : not feeling tired [Shortness Of Breath] : no shortness of breath [Orthopnea] : no orthopnea

## 2022-12-06 NOTE — HISTORY OF PRESENT ILLNESS
[Hypertension] : Hypertension [Prior Myocardial Infarction] : Prior Myocardial Infarction  [Anginal Classification within 2 wks] : Angina over the last 2 weeks [No angina, No symptoms] : No symptoms of [Class II] : Class II [Prior Heart Failure] : Prior Heart Failure [FreeTextEntry1] : Ms. Tello is her today for her 30 Day follow up visit from her Mitral RAND, which was done on 10/18/21.\par \par Today she states that she feels a little better than she did prior to the procedure, but her activity has been somewhat limited due to her other co-morbidities. She states that her breathing has gotten much better with activity since the procedure, but she wants to slowly get back into her activities.She was able to go into her attic the other day to carry down a box without any difficulty.\par \par

## 2022-12-06 NOTE — ASSESSMENT
[FreeTextEntry1] : Mrs. Tello is continuing to recover from her Tricuspid RAND. She is currently feeling better, and is slowly increasing her activities. Her ECG looked ok, and she had a TTE today, which we will review. I answered all questions she had regarding the procedure. She will continue to follow up with Dr. Jesus, and as part of the clinical trial, she will see us again at the 6 month point.

## 2022-12-22 ENCOUNTER — APPOINTMENT (OUTPATIENT)
Dept: INTERNAL MEDICINE | Facility: CLINIC | Age: 67
End: 2022-12-22

## 2023-01-05 NOTE — ASSESSMENT
[FreeTextEntry1] : 1) sz - poor control\par - ct neuro follow up\par \par 2) f/u onc \par - breast bx benign \par \par 3) CMP - stable\par - breathing OK \par \par 4) COPD\par \par - stable\par - stopped smoking  Raimundo VERGARA

## 2023-01-10 ENCOUNTER — APPOINTMENT (OUTPATIENT)
Dept: SURGICAL ONCOLOGY | Facility: CLINIC | Age: 68
End: 2023-01-10
Payer: MEDICARE

## 2023-01-24 NOTE — ADDENDUM
[FreeTextEntry1] : I spent 60 minutes face to face time with the patient via telemedicine ([facility], [facility]) from 10:00 to 11:00 on 9/30/21. Thirty minutes were devoted to patient counseling with emphasis on maintenance smoking cessation (see Narrative section). Informed consent for telemedicine was obtained prior to the visit. Prior to this visit, I reviewed office records of Mariola Blanc, Dr. Hernandez and SHREYAS Leal. After the visit, I reviewed findings and plans with Dr. Leonidas Mendieta, structural heart clinic.  Continue current regimen and medications.   Ensure clear NOT given

## 2023-01-26 ENCOUNTER — APPOINTMENT (OUTPATIENT)
Dept: PAIN MANAGEMENT | Facility: CLINIC | Age: 68
End: 2023-01-26
Payer: MEDICARE

## 2023-01-26 VITALS
SYSTOLIC BLOOD PRESSURE: 145 MMHG | HEART RATE: 86 BPM | BODY MASS INDEX: 17.07 KG/M2 | HEIGHT: 64 IN | WEIGHT: 100 LBS | DIASTOLIC BLOOD PRESSURE: 84 MMHG

## 2023-01-26 PROCEDURE — 99213 OFFICE O/P EST LOW 20 MIN: CPT

## 2023-01-27 ENCOUNTER — APPOINTMENT (OUTPATIENT)
Dept: NEUROLOGY | Facility: CLINIC | Age: 68
End: 2023-01-27
Payer: MEDICARE

## 2023-01-27 VITALS
BODY MASS INDEX: 17.07 KG/M2 | DIASTOLIC BLOOD PRESSURE: 98 MMHG | RESPIRATION RATE: 15 BRPM | SYSTOLIC BLOOD PRESSURE: 154 MMHG | HEIGHT: 64 IN | HEART RATE: 104 BPM | WEIGHT: 100 LBS

## 2023-01-27 PROCEDURE — 99214 OFFICE O/P EST MOD 30 MIN: CPT

## 2023-01-27 NOTE — DISCUSSION/SUMMARY
[Medically Refractory (seizure within the last year)] : Medically Refractory (seizure within the last year) [Complex Partial] : complex partial [Symptomatic] : symptomatic [Risks Associated with Driving/[place] Law] : As per my usual protocol, the patient was advised in regards to risks and driving privileges associated with the [place] Guidelines.  [Safety Recommendations] : The patient was advised in regards to the risk of seizures and general seizure safety recommendations including not to be bathing alone, climbing to high places and operating heavy machinery. [Compliance with Medications] : The importance of compliance with medications was reinforced. [Bone Health Education] : Patient was educated in regards to bone health and an increased risk of osteoporosis in patients with epilepsy. [Sleep Hygiene/Sleep Disruption Risks] : Sleep hygiene and the risks of sleep disruption were discussed. [Surgical Options/Risks/Benefits] : Surgical options/risks/benefits for intractable epilepsy were discussed. [Risk of Death] : Risk of death associated with seizures / SUDEP was discussed. [Opioids] : Patient was explained in detail about pain control by using opioids. Patient has signed and fully understands our guidelines for medication and drug screening.  Patient understands the side effects of opioids, including, but not limited to, drug tolerance, dependence, potential for addiction. This class of drugs is habit-forming and ELIJAH regulated. The sedative effects of opioids can be potentiated by taking alcohol or any sleeping pills, along with opioids. The decision to drive is patient’s responsibility, as opioids can affect his/her driving ability and ability to concentrate. The long-term place is not clear, however, patient understands that once the pain control optimizes, the goal will be to wean off the opioids. All the issues regarding opioid treatment have been addressed satisfactorily.  [FreeTextEntry1] : 67 year old woman with complex partial refractory epilepsy on Klonopin and CBZ s/p surgery over 20 years ago with chronic headache as well.\par \par -The VEEG showed left sided seizures from the Fronto-temporal region.  We discussed the possibility of epilepsy sx in detail.  She had neuropsych testing which showed diffuse cerebral dysfunction, right greater than left.\par \par Plan:\par - Taking Clobazam 40mg bid and doing better\par - Discussed risks of CBZ in osteoporosis, but risk of stopping medication high in increasing her seizures.  She knows risk of fracture at this time and is being treated by her PMD. Did poorly on Carbatrol and back on CBZ.\par - encouraged 3 servings Ca+ in diet and low weight bearing exercises\par - reviewed seizure triggers\par - f/u CBZ  (CBC,CMP) next visit. Done PMD.\par - good RX to [facility] for meds - much cheaper - looked with her\par -follow up in 2-3 months.\par \par Total time 32 min\par \par .
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
## 2023-01-30 NOTE — HISTORY OF PRESENT ILLNESS
[FreeTextEntry1] : 68 y/o F Pmhx HTN, HLD, CAD , severe MR s/p recent Tricuspid valve replacement , seizures s/p  temporal lobectomy, breast CA s/p surgery and chemo, migraines who presents today for follow up for chronic pain.  She reports over the last 2.5-3 weeks she had a viral illness, cough, muscle aches, persistent diarrhea and feels back to her normal self.  She is now having headaches,3-4 x/week described as a sharp pain on the right side of head 10/10 that can last hours to days associated with phonophobia.   Triggers include weather, changes to barometric pressure.  Chronic pain in her left foot unchanged since last visit that comes and goes 6/10 on average but up to 9-10/10 exacerbated by changes in weather.  She never tried Nurtec due to copay. \par \par Prior meds include: Corgard, Venlafaxine, Botox AE, MM, Treximet, MS , \par Current meds: hydrocodone 7.5/325mg 3-4x/day; clobazam, carbamazepine 200 mg TID, MM ( not effective and last used 6 months ago) \par

## 2023-01-30 NOTE — PHYSICAL EXAM
[Oriented To Time, Place, And Person] : oriented to person, place, and time [Affect] : the affect was normal [Mood] : the mood was normal [Person] : oriented to person [Place] : oriented to place [Time] : oriented to time [Cranial Nerves Facial (VII)] : face symmetrical [Cranial Nerves Vestibulocochlear (VIII)] : hearing was intact bilaterally [Cranial Nerves Accessory (XI - Cranial And Spinal)] : head turning and shoulder shrug symmetric [Cranial Nerves Hypoglossal (XII)] : there was no tongue deviation with protrusion [Motor Strength] : muscle strength was normal in all four extremities [Motor Handedness Right-Handed] : the patient is right hand dominant [Abnormal Walk] : normal gait [Sclera] : the sclera and conjunctiva were normal [PERRL With Normal Accommodation] : pupils were equal in size, round, reactive to light, with normal accommodation [Extraocular Movements] : extraocular movements were intact [] : no respiratory distress [FreeTextEntry1] : no signs of toxicity [Paresis Pronator Drift Right-Sided] : no pronator drift on the right [Motor Strength Upper Extremities Bilaterally] : strength was normal in both upper extremities [Paresis Pronator Drift Left-Sided] : no pronator drift on the left [Motor Strength Lower Extremities Bilaterally] : strength was normal in both lower extremities

## 2023-01-30 NOTE — ASSESSMENT
[Opioids] : Patient was explained in detail about pain control by using opioids. Patient has signed and fully understands our guidelines for medication and drug screening.  Patient understands the side effects of opioids, including, but not limited to, drug tolerance, dependence, potential for addiction. This class of drugs is habit-forming and ELIJAH regulated. The sedative effects of opioids can be potentiated by taking alcohol or any sleeping pills, along with opioids. The decision to drive is patient’s responsibility, as opioids can affect his/her driving ability and ability to concentrate. The long-term place is not clear, however, patient understands that once the pain control optimizes, the goal will be to wean off the opioids. All the issues regarding opioid treatment have been addressed satisfactorily.  [FreeTextEntry1] : 68 y/o F with chronic pain and Migraines \par \par Discussed in detail the nature of chronic pain as well as chronic opioid use for non-malignant pain. Long term use of opioids is not recommended and can potentially lead to hyperalgesia, tolerance and addiction. Additionally, we extensively discussed the risks possible AE when taking opioids alone or in conjunction with benzodiazepines, hypnotics and other sedating medications including respiratory suppression and death. We have discussed the importance of exploring nonopioid pain management including PT, therapeutic massage, stretching, exercise program, acupuncture, CBT as well as topical medications, non-opioid pain medications. The patient had the opportunity to ask questions and all were answered to their satisfaction.  The patient verbalized understanding of the management plan and agreed with our recommendations.\par \par \par decrease hydrocodone 7.5/325 mg 4x/day qty 105 to 3x/day and will continue to try to taper further. \par Trial of Nurtec but never was able to obtain and will try to go through [facility] assist. Reports she filled out paperwork but never heard back . \par Trial of Vyepti \par UDS performed September 2022 and c/w prescribed. \par I-Stop reviewed,  reference #: [id number]\par No signs of aberrant behavior and will continue to monitor for signs of toxicity.\par Reminded to continue to avoid alcohol.\par Patient denies other prescribers. \par Discussed OD prevention education and prescribed naloxone kit \par Safe storage of medication was reviewed. \par Opiate agreement was renewed Jan 26, 2023 \par \par \par \par \par \par \par

## 2023-02-07 ENCOUNTER — APPOINTMENT (OUTPATIENT)
Dept: SURGICAL ONCOLOGY | Facility: CLINIC | Age: 68
End: 2023-02-07
Payer: MEDICARE

## 2023-02-07 VITALS
HEIGHT: 64 IN | WEIGHT: 100 LBS | RESPIRATION RATE: 16 BRPM | TEMPERATURE: 97.5 F | DIASTOLIC BLOOD PRESSURE: 91 MMHG | OXYGEN SATURATION: 97 % | BODY MASS INDEX: 17.07 KG/M2 | HEART RATE: 85 BPM | SYSTOLIC BLOOD PRESSURE: 164 MMHG

## 2023-02-07 DIAGNOSIS — Z85.3 ENCOUNTER FOR FOLLOW-UP EXAMINATION AFTER COMPLETED TREATMENT FOR MALIGNANT NEOPLASM: ICD-10-CM

## 2023-02-07 DIAGNOSIS — Z08 ENCOUNTER FOR FOLLOW-UP EXAMINATION AFTER COMPLETED TREATMENT FOR MALIGNANT NEOPLASM: ICD-10-CM

## 2023-02-07 PROCEDURE — 99213 OFFICE O/P EST LOW 20 MIN: CPT

## 2023-02-07 NOTE — HISTORY OF PRESENT ILLNESS
[de-identified] : 67 year old female presents for a  follow up visit for her annual breast exam.\par \par Her history is notable for a left breast lumpectomy (1998) for stage 1 left breast cancer with adjuvant chemotherapy and radiation therapy. She suffered a recurrence and had re excision twice, and then completed 5 years of tamoxifen and 5 years of Femara under the supervision of Dr. Murray. Family history of breast cancer involves her maternal aunt. Patient is BRCA Negative. \par \par Her most recent screening mammo/US was done on 6/22/22 which revealed no evidence of malignancy BI-RADS 2 \par \par Denies palpable breast masses, nipple discharge, skin changes, inversion or breast pain. \par Pt. is s/p recent tricuspid valve replacement on 10/18/2021. Has a hx of migraines. Continues f/u with Neuro and Cardiology.

## 2023-02-07 NOTE — PHYSICAL EXAM
[FreeTextEntry1] : SC present for exam  [de-identified] : Normal S1,S2. Regular rate and rhythm  [de-identified] : Complete breast exam performed in supine and upright position. No palpable masses, tenderness, nipple discharge, inversion, deviation or enlarged axillary or supraclavicular lymph nodes bilaterally.  [de-identified] : normal respiratory effort, lungs clear to auscultation

## 2023-02-07 NOTE — ASSESSMENT
[FreeTextEntry1] : Imp:\par No evidence of new or recurrent lesions. Breast imaging failed to identify any suspicious lesions. No suspicious lesions on exam\par \par Plan:\par Continue yearly surveillance with MMG/US  (next 6/2023)\par \par All medical entries were at my, Dr. Calvin Landaverde, direction. I have reviewed the chart and agree that the record accurately reflects my personal performance of the history, physical exam, assessment and plan. Our office Nurse Practitioner was present of the duration of the office visit. \par \par

## 2023-02-22 ENCOUNTER — APPOINTMENT (OUTPATIENT)
Dept: NEUROLOGY | Facility: CLINIC | Age: 68
End: 2023-02-22
Payer: MEDICARE

## 2023-02-22 ENCOUNTER — OUTPATIENT (OUTPATIENT)
Dept: OUTPATIENT SERVICES | Facility: HOSPITAL | Age: 68
LOS: 1 days | Discharge: HOME | End: 2023-02-22

## 2023-02-22 DIAGNOSIS — Z94.5 SKIN TRANSPLANT STATUS: Chronic | ICD-10-CM

## 2023-02-22 DIAGNOSIS — Z90.89 ACQUIRED ABSENCE OF OTHER ORGANS: Chronic | ICD-10-CM

## 2023-02-22 DIAGNOSIS — D64.9 ANEMIA, UNSPECIFIED: ICD-10-CM

## 2023-02-22 PROCEDURE — 96365 THER/PROPH/DIAG IV INF INIT: CPT

## 2023-02-28 ENCOUNTER — RESULT REVIEW (OUTPATIENT)
Age: 68
End: 2023-02-28

## 2023-02-28 ENCOUNTER — APPOINTMENT (OUTPATIENT)
Age: 68
End: 2023-02-28

## 2023-02-28 ENCOUNTER — APPOINTMENT (OUTPATIENT)
Dept: HEMATOLOGY ONCOLOGY | Facility: CLINIC | Age: 68
End: 2023-02-28
Payer: MEDICARE

## 2023-02-28 VITALS
HEART RATE: 89 BPM | DIASTOLIC BLOOD PRESSURE: 86 MMHG | RESPIRATION RATE: 16 BRPM | OXYGEN SATURATION: 99 % | WEIGHT: 98 LBS | BODY MASS INDEX: 16.82 KG/M2 | TEMPERATURE: 98.1 F | SYSTOLIC BLOOD PRESSURE: 133 MMHG

## 2023-02-28 LAB
25(OH)D3 SERPL-MCNC: 28.9 NG/ML
ALBUMIN SERPL ELPH-MCNC: 4.5 G/DL
ALP BLD-CCNC: 145 U/L
ALT SERPL-CCNC: 7 U/L
ANION GAP SERPL CALC-SCNC: 16 MMOL/L
AST SERPL-CCNC: 14 U/L
BASOPHILS # BLD AUTO: 0.07 K/UL — SIGNIFICANT CHANGE UP (ref 0–0.2)
BASOPHILS NFR BLD AUTO: 0.9 % — SIGNIFICANT CHANGE UP (ref 0–2)
BILIRUB SERPL-MCNC: 0.4 MG/DL
BUN SERPL-MCNC: 6 MG/DL
CALCIUM SERPL-MCNC: 10 MG/DL
CHLORIDE SERPL-SCNC: 102 MMOL/L
CO2 SERPL-SCNC: 24 MMOL/L
CREAT SERPL-MCNC: 0.59 MG/DL
EGFR: 99 ML/MIN/1.73M2
EOSINOPHIL # BLD AUTO: 0.3 K/UL — SIGNIFICANT CHANGE UP (ref 0–0.5)
EOSINOPHIL NFR BLD AUTO: 4 % — SIGNIFICANT CHANGE UP (ref 0–6)
GLUCOSE SERPL-MCNC: 103 MG/DL
HCT VFR BLD CALC: 40.8 % — SIGNIFICANT CHANGE UP (ref 34.5–45)
HGB BLD-MCNC: 13.6 G/DL — SIGNIFICANT CHANGE UP (ref 11.5–15.5)
IMM GRANULOCYTES NFR BLD AUTO: 0.5 % — SIGNIFICANT CHANGE UP (ref 0–0.9)
LYMPHOCYTES # BLD AUTO: 2.26 K/UL — SIGNIFICANT CHANGE UP (ref 1–3.3)
LYMPHOCYTES # BLD AUTO: 30.1 % — SIGNIFICANT CHANGE UP (ref 13–44)
MCHC RBC-ENTMCNC: 32.6 PG — SIGNIFICANT CHANGE UP (ref 27–34)
MCHC RBC-ENTMCNC: 33.3 G/DL — SIGNIFICANT CHANGE UP (ref 32–36)
MCV RBC AUTO: 97.8 FL — SIGNIFICANT CHANGE UP (ref 80–100)
MONOCYTES # BLD AUTO: 0.53 K/UL — SIGNIFICANT CHANGE UP (ref 0–0.9)
MONOCYTES NFR BLD AUTO: 7.1 % — SIGNIFICANT CHANGE UP (ref 2–14)
NEUTROPHILS # BLD AUTO: 4.31 K/UL — SIGNIFICANT CHANGE UP (ref 1.8–7.4)
NEUTROPHILS NFR BLD AUTO: 57.4 % — SIGNIFICANT CHANGE UP (ref 43–77)
NRBC # BLD: 0 /100 WBCS — SIGNIFICANT CHANGE UP (ref 0–0)
PLATELET # BLD AUTO: 189 K/UL — SIGNIFICANT CHANGE UP (ref 150–400)
POTASSIUM SERPL-SCNC: 4.4 MMOL/L
PROT SERPL-MCNC: 7.5 G/DL
RBC # BLD: 4.17 M/UL — SIGNIFICANT CHANGE UP (ref 3.8–5.2)
RBC # FLD: 13.2 % — SIGNIFICANT CHANGE UP (ref 10.3–14.5)
SODIUM SERPL-SCNC: 142 MMOL/L
WBC # BLD: 7.51 K/UL — SIGNIFICANT CHANGE UP (ref 3.8–10.5)
WBC # FLD AUTO: 7.51 K/UL — SIGNIFICANT CHANGE UP (ref 3.8–10.5)

## 2023-02-28 PROCEDURE — 99214 OFFICE O/P EST MOD 30 MIN: CPT

## 2023-02-28 NOTE — HISTORY OF PRESENT ILLNESS
[de-identified] : 67 F with past medical history of seizures, cardiomyopathy, HLD, CAD, MR, chronic lower back pain, osteoporosis, with history of recurrent left breast cancer, returning for medical oncology follow-up.\par \par CASE SYNOPSIS:\par 1998–stage I left breast cancer; received adjuvant CMF and chest wall radiation.\par \par 19 99–2004–completes 5 years of tamoxifen.\par \par 10/2004 -11/2009: Letrozole for left breast cancer recurrence (under the care of Dr. Murray).\par \par 6/21/2021–US breast bilateral/screening mammogram: Dense breasts; no mammographic or sonographic evidence of malignancy.\par \par 6/22/2022: Bilateral breast US/mammogram–no evidence of malignancy.  [FreeTextEntry1] : Off  HRT since 2009 [de-identified] : Here for annual follow-up of BRCA negative stage I left breast IDC. Since her last visit in January 2022 patient has been clinically stable; follow-up with neurology for persistent seizures.  EEG showed left-sided seizures from the frontal temporal region; she had neuropsychiatric testing which showed diffuse cerebral dysfunction, R> L.  Last fall recorded in November 2022.  Antiseizure medication adjusted; taking clobazam 40 mg BID.  Also follows up with cardiology s/p mitral RAND; she is part of [facility] clinical trial using Adenike device.  Denies recent hospitalization. Ambulates with cane.

## 2023-02-28 NOTE — REVIEW OF SYSTEMS
[Joint Pain] : joint pain [Joint Stiffness] : joint stiffness [Negative] : Integumentary [de-identified] : continues to present seizures

## 2023-02-28 NOTE — PHYSICAL EXAM
[Ambulatory and capable of all self care but unable to carry out any work activities] : Status 2- Ambulatory and capable of all self care but unable to carry out any work activities. Up and about more than 50% of waking hours [Normal] : RRR, normal S1S2, no murmurs, rubs, gallops [Thin] : thin [de-identified] : s/p left breast lumpectomy, small circumareolar scar well healed

## 2023-02-28 NOTE — ASSESSMENT
[FreeTextEntry1] : Ms. GONZALEZ 's questions were answered to her satisfaction. \par She  expressed her  understanding and willingness to comply with the above recommendations, and  will return to the office in 1 year.\par \par \par \par \par \par \par \par \par \par \par

## 2023-03-01 LAB — CARBAMAZEPINE SERPL-MCNC: 8.6 UG/ML

## 2023-03-24 ENCOUNTER — APPOINTMENT (OUTPATIENT)
Dept: PAIN MANAGEMENT | Facility: CLINIC | Age: 68
End: 2023-03-24
Payer: MEDICARE

## 2023-03-24 VITALS
WEIGHT: 97 LBS | HEIGHT: 64 IN | BODY MASS INDEX: 16.56 KG/M2 | DIASTOLIC BLOOD PRESSURE: 81 MMHG | HEART RATE: 101 BPM | SYSTOLIC BLOOD PRESSURE: 127 MMHG

## 2023-03-24 PROCEDURE — 99213 OFFICE O/P EST LOW 20 MIN: CPT

## 2023-03-24 RX ORDER — NALOXONE HYDROCHLORIDE 4 MG/.1ML
4 SPRAY NASAL
Qty: 1 | Refills: 1 | Status: ACTIVE | COMMUNITY
Start: 2023-03-24 | End: 1900-01-01

## 2023-03-24 NOTE — ASSESSMENT
[Opioids] : Patient was explained in detail about pain control by using opioids. Patient has signed and fully understands our guidelines for medication and drug screening.  Patient understands the side effects of opioids, including, but not limited to, drug tolerance, dependence, potential for addiction. This class of drugs is habit-forming and ELIJAH regulated. The sedative effects of opioids can be potentiated by taking alcohol or any sleeping pills, along with opioids. The decision to drive is patient’s responsibility, as opioids can affect his/her driving ability and ability to concentrate. The long-term place is not clear, however, patient understands that once the pain control optimizes, the goal will be to wean off the opioids. All the issues regarding opioid treatment have been addressed satisfactorily.  [FreeTextEntry1] : 68 y/o F with chronic pain and Migraines \par \par Discussed in detail the nature of chronic pain as well as chronic opioid use for non-malignant pain. Long term use of opioids is not recommended and can potentially lead to hyperalgesia, tolerance and addiction. Additionally, we extensively discussed the risks possible AE when taking opioids alone or in conjunction with benzodiazepines, hypnotics and other sedating medications including respiratory suppression and death. We have discussed the importance of exploring nonopioid pain management including PT, therapeutic massage, stretching, exercise program, acupuncture, CBT as well as topical medications, non-opioid pain medications. The patient had the opportunity to ask questions and all were answered to their satisfaction.  The patient verbalized understanding of the management plan and agreed with our recommendations.\par \par \par decrease hydrocodone 7.55/325 mg 3x/day to 5/325 mg 4x/day. Consider further gradual taper. \par Trial of Nurtec but never was able to obtain and will try to go through [facility] assist. Reports she filled out paperwork but never heard back . \par Vyepti tx started last month.  \par UDS performed September 2022 and c/w prescribed. \par I-Stop reviewed,  reference #: [id number]\par No signs of aberrant behavior and will continue to monitor for signs of toxicity.\par Reminded to continue to avoid alcohol.\par Patient denies other prescribers. \par Discussed OD prevention education and prescribed naloxone kit \par Safe storage of medication was reviewed. \par Opiate agreement was renewed Jan 26, 2023 \par \par \par \par \par \par \par

## 2023-03-24 NOTE — HISTORY OF PRESENT ILLNESS
[FreeTextEntry1] : 66 y/o F Pmhx HTN, HLD, CAD , severe MR s/p recent Tricuspid valve replacement , seizures s/p  temporal lobectomy, breast CA s/p surgery and chemo, migraines who presents today for follow up for chronic pain.  She had her first Vyepti infusion last month. She reports 2 weeks after Vyepti she developed runny nose and sneezing on and off.  She is now having headaches,3-4 x/week described as a sharp pain on the right side of head 7-9/10 that can last hours to days associated with phonophobia.   Triggers include weather, changes to barometric pressure.  \par \par Chronic pain in her left foot unchanged since last visit that comes and goes 6/10 on average exacerbated by changes in weather.  She never tried Nurtec due to copay. \par \par Prior meds include: Corgard, Venlafaxine, Botox AE, MM, Treximet, MS , \par Current meds: hydrocodone 7.5/325mg 3-4x/day; clobazam, carbamazepine 200 mg TID, MM ( not effective and last used 6 months ago) , Vyepti \par

## 2023-04-05 ENCOUNTER — APPOINTMENT (OUTPATIENT)
Dept: CARDIOLOGY | Facility: CLINIC | Age: 68
End: 2023-04-05
Payer: MEDICARE

## 2023-04-05 ENCOUNTER — NON-APPOINTMENT (OUTPATIENT)
Age: 68
End: 2023-04-05

## 2023-04-05 VITALS — HEART RATE: 77 BPM | SYSTOLIC BLOOD PRESSURE: 102 MMHG | OXYGEN SATURATION: 99 % | DIASTOLIC BLOOD PRESSURE: 55 MMHG

## 2023-04-05 VITALS — OXYGEN SATURATION: 98 % | DIASTOLIC BLOOD PRESSURE: 55 MMHG | HEART RATE: 76 BPM | SYSTOLIC BLOOD PRESSURE: 114 MMHG

## 2023-04-05 VITALS
HEART RATE: 76 BPM | OXYGEN SATURATION: 99 % | WEIGHT: 99 LBS | DIASTOLIC BLOOD PRESSURE: 60 MMHG | HEIGHT: 64 IN | BODY MASS INDEX: 16.9 KG/M2 | RESPIRATION RATE: 16 BRPM | SYSTOLIC BLOOD PRESSURE: 114 MMHG

## 2023-04-05 VITALS — DIASTOLIC BLOOD PRESSURE: 69 MMHG | OXYGEN SATURATION: 98 % | SYSTOLIC BLOOD PRESSURE: 90 MMHG | HEART RATE: 89 BPM

## 2023-04-05 VITALS — HEART RATE: 83 BPM | SYSTOLIC BLOOD PRESSURE: 123 MMHG | DIASTOLIC BLOOD PRESSURE: 69 MMHG | OXYGEN SATURATION: 98 %

## 2023-04-05 DIAGNOSIS — E78.5 HYPERLIPIDEMIA, UNSPECIFIED: ICD-10-CM

## 2023-04-05 PROCEDURE — 93000 ELECTROCARDIOGRAM COMPLETE: CPT

## 2023-04-05 PROCEDURE — 93040 RHYTHM ECG WITH REPORT: CPT | Mod: 59

## 2023-04-05 PROCEDURE — 99407 BEHAV CHNG SMOKING > 10 MIN: CPT

## 2023-04-05 NOTE — DISCUSSION/SUMMARY
[FreeTextEntry1] : \par SMOKING CESSATION:\par We all know the health risks of smoking, and most of us know that kicking the habit is the single biggest improvement to health a smoker can make. But that doesn’t make it any easier to kick the habit. Whether you’re a teen smoker or a lifetime pack-a-day smoker, quitting can be tough.\par To increase your chances of success, you need to be motivated, have social support, an understanding of what to expect, and a personal game plan. It is possible to learn how to replace your smoking habits, manage your cravings, and join the millions of people who have kicked the habit for good.\par Smoking tobacco is both a psychological habit and a physical addiction. The act of smoking is ingrained as a daily ritual and, at the same time, the nicotine from cigarettes provides a temporary, and addictive, high. Eliminating that regular fix of nicotine will cause your body to experience physical withdrawal symptoms and cravings. To successfully quit smoking, you’ll need to address both the habit and the addiction by changing your behavior and dealing with nicotine withdrawal symptoms.\par Relieving unpleasant and overwhelming feelings without cigarettes\par Managing unpleasant feelings such as stress, depression, loneliness, fear, and anxiety are some of the most common reasons why adults smoke. When you have a bad day, it can seem like your cigarettes are your only friend. Smoking can temporarily make feelings such as sadness, stress, anxiety, depression, and boredom evaporate into thin air. As much comfort as cigarettes provide, though, it’s important to remember that there are healthier (and more effective) ways to keep unpleasant feelings in check. These may include exercising, meditating, using sensory relaxation strategies, and practicing simple breathing exercises. \par For many people, an important aspect of quitting smoking is to find alternate ways to handle these difficult feelings without smoking. Even when cigarettes are no longer a part of your life, the painful and unpleasant feelings that may have prompted you to smoke in the past will still remain. So, it’s worth spending some time thinking about the different ways you intend to deal with stressful situations and the daily irritations that would normally have you reaching for a cigarette.\par Tailoring a personal game plan to your specific needs and desires can be a big help. List the reasons why you want to quit and then keep copies of the list in the places where you’d normally keep your cigarettes, such as in your jacket, purse, or car. Your reasons for quitting smoking might include:\par I will feel healthier and have more energy, whiter teeth and fresher breath.\par I will lower my risk for cancer, heart attacks, strokes, early death, cataracts, and skin wrinkling.\par I will make myself and my partner, friends, and family proud of me.\par I will no longer expose my children and others to the dangers of my second-hand smoke.\par I will have a healthier baby (If you or your partner is pregnant).\par I will have more money to spend.\par I won't have to worry: "When will I get to smoke next?"\par Questions to ask yourself\par To successfully detach from smoking, you will need to identify and address your smoking habits, the true nature of your dependency, and the techniques that work for you. These types of questions can help:\par Do you feel the need to smoke at every meal?\par Are you more of a social smoker?\par Is it a very bad addiction (more than a pack a day)? Or would a simple nicotine patch do the job?\par Is your cigarette smoking linked to other addictions, such as alcohol or gambling?\par Are you open to hypnotherapy and/or acupuncture?\par Are you someone who is open to talking about your addiction with a therapist or counselor?\par Are you interested in getting into a fitness program?\par Take the time to think of what kind of smoker you are, which moments of your life call for a cigarette, and why. This will help you to identify which tips, techniques or therapies may be most beneficial for you. \par Start your stop smoking plan with START\par S = Set a quit date.\par T = Tell family, friends, and co-workers that you plan to quit.\par A = Anticipate and plan for the challenges you'll face while quitting.\par R = Remove cigarettes and other tobacco products from your home, car, and work.\par T = Talk to your doctor about getting help to quit.\par \par After quitting, you may feel dizzy, restless, or even have strong headaches because you’re lacking the immediate release of sugar that comes from nicotine. You may also have a bigger appetite. These sugar-related cravings should only last a few days until your body adjusts so keep your sugar levels a bit higher than usual on those days by drinking plenty of juice (unless you’re a diabetic). It will help prevent the craving symptoms and help your body re-adjust back to normal.\par Tips for managing other cigarette cravings\par Cravings associated with meals\par For some smokers, ending a meal means lighting up, and the prospect of giving that up may appear daunting. TIP: replace that moment after a meal with something such as a piece of fruit, a (healthy) dessert, a square of chocolate, or a stick of gum.\par \par Alcohol and cigarettes\par Many people have a habit of smoking when they have an alcoholic drink. TIP: try non-alcoholic drinks, or try drinking only in bars, restaurant-bars, or friends’ houses where smoking inside is prohibited. Or try snacking on nuts and chips, or chewing on a straw or cocktail stick.\par \par Cravings associated with social smoking\par When friends, family, and co-workers smoke around you, it is doubly difficult to quit or avoid relapse. TIP: Your social circles need to know that you are changing your habits so talk about your decision to quit. Let them know they won’t be able to smoke when you’re in the car with them or taking a coffee break together. \par \par In your workplace, don’t take all your coffee breaks with smokers only, do something else instead, or find non-smokers to have your breaks with.\par Additional tips to deal with cravings and withdrawal symptoms\par Stay active: Keep yourself distracted and occupied, go for walks.\par Keep your hands/fingers busy: Squeeze balls, pencils, or paper clips are good substitutes to satisfy that need for tactile stimulation.\par Keep your mind busy: Read a book or magazine, listen to some music you love.\par Find an oral substitute: Keep other things around to pop in your mouth when you’re craving a cigarette.\par Good choices include mints, hard candy, carrot or celery sticks, gum, and sunflower seeds.\par Drink lots of water: Flushing toxins from your body minimizes withdrawal symptoms and helps cravings pass faster.\par Look for new ways to relax and to cope with depression or anxiety: There are a lot of ways to improve your mood without smoking. \par \par Finding the resources and support to quit smoking\par There are many different methods that have successfully helped people to quit smoking, including:\par Quitting smoking cold turkey.\par Systematically decreasing the number of cigarettes you smoke.\par Reducing your intake of nicotine gradually over time.\par Using nicotine replacement therapy or non-nicotine medications to reduce withdrawal symptoms.\par Utilizing nicotine support groups.\par Trying hypnosis, acupuncture, or counseling using cognitive behavioral techniques.\par You may be successful with the first method you try. More likely, you’ll have to try a number of different methods or a combination of treatments to find the ones that work best for you.\par \par Medication therapy\par Smoking cessation medications can ease withdrawal symptoms and reduce cravings, and are most effective when used as part of a comprehensive stop smoking program monitored by your physician. Talk to your doctor about your options and whether an anti-smoking medication is right for you. U.S. Food and Drug Administration (FDA) approved options are: \par \par Nicotine Replacement Therapy\par Nicotine replacement therapy involves "replacing" cigarettes with other nicotine substitutes, such as nicotine gum or a nicotine patch. It works by delivering small and steady doses of nicotine into the body to relieve some of the withdrawal symptoms without the tars and poisonous gases found in cigarettes. This type of treatment helps smokers focus on breaking their psychological addiction and makes it easier to concentrate on learning new behaviors and coping skills.\par \par Non-Nicotine Medication\par These medications help you stop smoking by reducing cravings and withdrawal symptoms without the use of nicotine. Medications such as bupropion (Zyban) and varenicline (Chantix) are intended for short-term use only.\par \par Non-medication therapies\par There are several things you can do to stop smoking that don’t involve nicotine replacement therapy or prescription medications:\par Hypnosis\par A popular option that has produced good results. Forget anything you may have seen from stage hypnotists, hypnosis works by getting you into a deeply relaxed state where you are open to suggestions that strengthen your resolve to quit smoking and increase your negative feelings toward cigarettes. Ask your doctor to recommend a qualified smoking cessation hypnotherapist in your area or refer to the American Society of Clinical Hypnosis (ASCH) for guidelines on selecting a qualified professional.\par Acupuncture\par One of the oldest known medical techniques, acupuncture is believed to work by triggering the release of endorphins (natural pain relievers) that allow the body to relax. As a smoking cessation aid, acupuncture can be helpful in managing smoking withdrawal symptoms. Ask your doctor for a referral or search for a local practitioner at the American Association of Acupuncture and [place] [employer] (AAAOM).\par Behavioral Therapy\par Nicotine addiction is related to the habitual behaviors (the “rituals”) involved in smoking. Behavior therapy focuses on learning new coping skills and breaking those habits. The American Lung Association offers a free online smoking cessation program that focuses on behavioral change. To find a local behavioral therapist, check with your doctor or search at the Association for Behavioral and Cognitive Therapies (ABCT).\par Motivational Therapies\par Self-help books and websites can provide a number of ways to motivate yourself to quit smoking. One well known example is calculating the monetary savings. Some people have been able to find the motivation to quit just by calculating how much money they will save after they quit. It may be enough to pay for a summer vacation.\par \par [facility]\par [address]\[place] [address]\par [phone]\par [email or web address]\par \par \par WEIGHT GOALS:\par Category				BMI range - kg/m2	BMI Prime\par Very severely underweight		less than 15		less than 0.60	\par Severely underweight			from 15.0 to 16.0		from 0.60 to 0.64	\par Underweight				from 16.0 to 18.5		from 0.64 to 0.74	\par Normal (healthy weight)			from 18.5 to 25		from 0.74 to 1.0	\par Overweight				from 25 to 30		from 1.0 to 1.2	\par Obese Class I (Moderately obese)		from 30 to 35		from 1.2 to 1.4	\par Obese Class II (Severely obese)		from 35 to 40		from 1.4 to 1.6	\par Obese Class III (Very severely obese)	over 40	over 1.6				\par \par Your current weight is 99 lbs (99 lbs on 11/2/21; 91 lbs on 2/23/21; 98 lbs on 8/12/21; 100 lbs on 2/5/2020; 105 lbs on 7/31/19; 85 lbs on 1/16/19; 81 lbs on 6/21/18; 85 lbs on 1/9/18). Given current weight and height 5'4", your calculated body mass index (BMI) is 17 kg/sqm. Normal BMI is 18.5-25 kg/sqm. Thus current weight is in the underweight category. Abdominal waist circumference is measured at the level of the umbilicus and is thus not a pants waist measurement. Your current abdominal waist circumference is 31.5" in (31 in on 2/24/21). Normal abdominal waist circumference is < 32 inches for women and < 37 inches for men.
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
## 2023-04-05 NOTE — CARDIOLOGY SUMMARY
[de-identified] : 4/5/23, normal sinus rhythm at 72 bpm with non-specific ST-segment and T-wave changes. Prior ECG 9/28/22 showed normal sinus rhythm at 71 bpm, with occasional ectopic ventricular beat. There was nonspecific ST depression   +   negative T-waves, noted previously. Rhythm / RR – interval analysis 4/5/23 observed 105 beats over 90 sec with mean HR 72 bpm; mean  ms ([phone]); Max/Min 121%; RR SD 31 and RR CV 3%. There were no PVCs or PACs [de-identified] : 6/18/21, small moderate anteroapical and distal inferior defects that are fixed, consistent with infarct. No segmental wall motion abnormality; LVEF 61%. There was no significant change compared to prior study 3/7/17. [de-identified] : 11/14/22, DAGO ACE RAND on A2/P2.  The device is well seated and attached, with minimal mitral regurgitation. The peak / mean gradients were 11/4 mm Hg (HR 82 bpm). Aortic root: 3.2 cm, LVOT diameter: 2 cm. The aortic valves was minimally calcified trileaflet with mild - mod regurgitation. The LA was normal in size with volume index 27 cc/sqm. The LV was normal in size. There was basal inferior and inferolateral wall hypokinesis. Overall systolic function was normal with EF 51%. The RA was normal in size. T=RV size and function were normal. The tricuspid and pulmonic valves appeared normal, both with minimal regurgitation. The pericardium appeared normal, with no effusion. Compared with study 5/9/2022, there were no significant overall changes [de-identified] : RHC / LHC: 6/2/21, Ostial LM 30 % stenosis. pLAD 60% stenosis in the distal third of the vessel segment, at the origin of D1. dLCX 50% stenosis in the proximal third of the vessel segment. p RCA 60 % stenosis. mRCA 60 % stenosis

## 2023-04-05 NOTE — COUNSELING
[Cessation strategies including cessation program discussed] : Cessation strategies including cessation program discussed [Use of nicotine replacement therapies and other medications discussed] : Use of nicotine replacement therapies and other medications discussed [Encouraged to pick a quit date and identify support needed to quit] : Encouraged to pick a quit date and identify support needed to quit [No] : Not willing to quit smoking [Yes] : Risk of tobacco use and health benefits of smoking cessation discussed: Yes [FreeTextEntry3] : 15

## 2023-04-05 NOTE — REVIEW OF SYSTEMS
[Under Stress] : under stress [Negative] : Heme/Lymph [Fever] : no fever [Headache] : no headache [Chills] : no chills [Weight Gain (___ Lbs)] : no recent weight gain [Feeling Fatigued] : not feeling fatigued [Weight Loss (___ Lbs)] : no recent weight loss [de-identified] : see HPI [de-identified] : see HPI
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
## 2023-05-07 LAB
25(OH)D3 SERPL-MCNC: 35.7 NG/ML
APPEARANCE: CLEAR
BACTERIA: ABNORMAL /HPF
BILIRUBIN URINE: NEGATIVE
BLOOD URINE: NEGATIVE
CAST: 0 /LPF
CHOLEST SERPL-MCNC: 169 MG/DL
CK SERPL-CCNC: 63 U/L
COLOR: YELLOW
CREAT SPEC-SCNC: 44 MG/DL
CRP SERPL HS-MCNC: 4.11 MG/L
EPITHELIAL CELLS: 8 /HPF
ESTIMATED AVERAGE GLUCOSE: 105 MG/DL
GLUCOSE QUALITATIVE U: NEGATIVE MG/DL
HBA1C MFR BLD HPLC: 5.3 %
HDLC SERPL-MCNC: 56 MG/DL
KETONES URINE: NEGATIVE MG/DL
LDLC SERPL CALC-MCNC: 92 MG/DL
LDLC SERPL DIRECT ASSAY-MCNC: 86 MG/DL
LEUKOCYTE ESTERASE URINE: ABNORMAL
MAGNESIUM SERPL-MCNC: 2.2 MG/DL
MICROALBUMIN 24H UR DL<=1MG/L-MCNC: <1.2 MG/DL
MICROALBUMIN/CREAT 24H UR-RTO: NORMAL MG/G
MICROSCOPIC-UA: NORMAL
NITRITE URINE: POSITIVE
NONHDLC SERPL-MCNC: 113 MG/DL
NT-PROBNP SERPL-MCNC: 154 PG/ML
PH URINE: 6.5
PROTEIN URINE: NEGATIVE MG/DL
RED BLOOD CELLS URINE: 0 /HPF
REVIEW: NORMAL
SPECIFIC GRAVITY URINE: 1.01
T4 FREE SERPL-MCNC: 0.8 NG/DL
TRIGL SERPL-MCNC: 107 MG/DL
TSH SERPL-ACNC: 1.23 UIU/ML
UROBILINOGEN URINE: 0.2 MG/DL
WHITE BLOOD CELLS URINE: 3 /HPF

## 2023-05-12 ENCOUNTER — APPOINTMENT (OUTPATIENT)
Dept: NEUROLOGY | Facility: CLINIC | Age: 68
End: 2023-05-12

## 2023-05-16 ENCOUNTER — APPOINTMENT (OUTPATIENT)
Dept: NEUROLOGY | Facility: CLINIC | Age: 68
End: 2023-05-16
Payer: MEDICARE

## 2023-05-16 VITALS
SYSTOLIC BLOOD PRESSURE: 166 MMHG | WEIGHT: 99 LBS | HEIGHT: 64 IN | DIASTOLIC BLOOD PRESSURE: 92 MMHG | HEART RATE: 87 BPM | BODY MASS INDEX: 16.9 KG/M2

## 2023-05-16 PROCEDURE — 99214 OFFICE O/P EST MOD 30 MIN: CPT
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
## 2023-05-16 NOTE — DISCUSSION/SUMMARY
[Medically Refractory (seizure within the last year)] : Medically Refractory (seizure within the last year) [Complex Partial] : complex partial [Symptomatic] : symptomatic [Risks Associated with Driving/[place] Law] : As per my usual protocol, the patient was advised in regards to risks and driving privileges associated with the [place] Guidelines.  [Safety Recommendations] : The patient was advised in regards to the risk of seizures and general seizure safety recommendations including not to be bathing alone, climbing to high places and operating heavy machinery. [Compliance with Medications] : The importance of compliance with medications was reinforced. [Bone Health Education] : Patient was educated in regards to bone health and an increased risk of osteoporosis in patients with epilepsy. [Sleep Hygiene/Sleep Disruption Risks] : Sleep hygiene and the risks of sleep disruption were discussed. [Surgical Options/Risks/Benefits] : Surgical options/risks/benefits for intractable epilepsy were discussed. [Risk of Death] : Risk of death associated with seizures / SUDEP was discussed. [Opioids] : Patient was explained in detail about pain control by using opioids. Patient has signed and fully understands our guidelines for medication and drug screening.  Patient understands the side effects of opioids, including, but not limited to, drug tolerance, dependence, potential for addiction. This class of drugs is habit-forming and ELIJAH regulated. The sedative effects of opioids can be potentiated by taking alcohol or any sleeping pills, along with opioids. The decision to drive is patient’s responsibility, as opioids can affect his/her driving ability and ability to concentrate. The long-term place is not clear, however, patient understands that once the pain control optimizes, the goal will be to wean off the opioids. All the issues regarding opioid treatment have been addressed satisfactorily.  [FreeTextEntry1] : 67 year old woman with complex partial refractory epilepsy on Klonopin and CBZ s/p surgery over 20 years ago with chronic headache as well.\par \par -The VEEG showed left sided seizures from the Fronto-temporal region.  We discussed the possibility of epilepsy sx in detail.  She had neuropsych testing which showed diffuse cerebral dysfunction, right greater than left.\par \par Plan:\par - Taking Clobazam 40mg bid and doing better\par - Discussed risks of CBZ in osteoporosis, but risk of stopping medication high in increasing her seizures.  She knows risk of fracture at this time and is being treated by her PMD. Did poorly on Carbatrol and back on CBZ.\par - encouraged 3 servings Ca+ in diet and low weight bearing exercises\par - reviewed seizure triggers\par - f/u CBZ  (CBC,CMP) next visit. Done PMD and staple\par -follow up in 2-3 months.\par \par Total time 32 min\par \par .

## 2023-05-18 ENCOUNTER — APPOINTMENT (OUTPATIENT)
Dept: NEUROLOGY | Facility: CLINIC | Age: 68
End: 2023-05-18
Payer: MEDICARE

## 2023-05-18 PROCEDURE — 96365 THER/PROPH/DIAG IV INF INIT: CPT

## 2023-05-30 ENCOUNTER — APPOINTMENT (OUTPATIENT)
Dept: PAIN MANAGEMENT | Facility: CLINIC | Age: 68
End: 2023-05-30
Payer: MEDICARE

## 2023-05-30 PROCEDURE — 99214 OFFICE O/P EST MOD 30 MIN: CPT

## 2023-05-30 NOTE — ASSESSMENT
[Opioids] : Patient was explained in detail about pain control by using opioids. Patient has signed and fully understands our guidelines for medication and drug screening.  Patient understands the side effects of opioids, including, but not limited to, drug tolerance, dependence, potential for addiction. This class of drugs is habit-forming and ELIJAH regulated. The sedative effects of opioids can be potentiated by taking alcohol or any sleeping pills, along with opioids. The decision to drive is patient’s responsibility, as opioids can affect his/her driving ability and ability to concentrate. The long-term place is not clear, however, patient understands that once the pain control optimizes, the goal will be to wean off the opioids. All the issues regarding opioid treatment have been addressed satisfactorily.  [FreeTextEntry1] : 66 y/o F with chronic pain and Migraines \par \par Discussed in detail the nature of chronic pain as well as chronic opioid use for non-malignant pain. Long term use of opioids is not recommended and can potentially lead to hyperalgesia, tolerance and addiction. Additionally, we extensively discussed the risks possible AE when taking opioids alone or in conjunction with benzodiazepines, hypnotics and other sedating medications including respiratory suppression and death. We have discussed the importance of exploring nonopioid pain management including PT, therapeutic massage, stretching, exercise program, acupuncture, CBT as well as topical medications, non-opioid pain medications. The patient had the opportunity to ask questions and all were answered to their satisfaction.  The patient verbalized understanding of the management plan and agreed with our recommendations.\par \par decrease hydrocodone 5/325 mg 4x/day. Consider further gradual taper. \par  Nurtec through [employer] assist. \par continue Vyepti tx for nw and will consider increasing dose\par UDS performed September 2022 and c/w prescribed. Repeated 5/30/2023\par I-Stop reviewed,  reference #: [id number]\par No signs of aberrant behavior and will continue to monitor for signs of toxicity.\par Reminded to continue to avoid alcohol.\par Patient denies other prescribers. \par Discussed OD prevention education and prescribed naloxone kit \par Safe storage of medication was reviewed. \par Opiate agreement was renewed Jan 26, 2023 \par \par \par \par \par \par \par

## 2023-05-30 NOTE — HISTORY OF PRESENT ILLNESS
[FreeTextEntry1] : 66 y/o F Pmhx HTN, HLD, CAD , severe MR s/p recent Tricuspid valve replacement , seizures s/p  temporal lobectomy, breast CA s/p surgery and chemo, migraines who presents today for follow up for chronic pain.  She had her second Vyepti infusion May 18th and 3 days later she developed seizure.  She is now having headaches,3 x/week described as a sharp pain on the right side of head 7-9/10 that can last hours to days associated with phonophobia.   Triggers include weather, changes to barometric pressure.  \par She started Nurtec \par Chronic pain in her left foot unchanged since last visit that comes and goes 6/10 on average exacerbated by changes in weather.  \par \par Prior meds include: Corgard, Venlafaxine, Botox AE, MM, Treximet, MS , \par Current meds: hydrocodone 5/325mg 4x/day; clobazam, carbamazepine 200 mg TID, MM ( not effective and last used 6 months ago) , Vyepti \par

## 2023-06-01 RX ORDER — RIMEGEPANT SULFATE 75 MG/75MG
TABLET, ORALLY DISINTEGRATING ORAL
Qty: 16 | Refills: 5 | Status: ACTIVE | COMMUNITY
Start: 2022-08-04 | End: 1900-01-01

## 2023-07-27 ENCOUNTER — APPOINTMENT (OUTPATIENT)
Dept: PAIN MANAGEMENT | Facility: CLINIC | Age: 68
End: 2023-07-27
Payer: MEDICARE

## 2023-07-27 ENCOUNTER — NON-APPOINTMENT (OUTPATIENT)
Age: 68
End: 2023-07-27

## 2023-07-27 VITALS
SYSTOLIC BLOOD PRESSURE: 133 MMHG | HEIGHT: 64 IN | BODY MASS INDEX: 17.93 KG/M2 | DIASTOLIC BLOOD PRESSURE: 86 MMHG | HEART RATE: 72 BPM | WEIGHT: 105 LBS

## 2023-07-27 PROCEDURE — 99214 OFFICE O/P EST MOD 30 MIN: CPT

## 2023-07-27 NOTE — HISTORY OF PRESENT ILLNESS
[FreeTextEntry1] : Since last visit, patient reports pain controlled with hydrocodone and headaches controlled with Nurtec. However, stopped taking it due to high cost. Patient noted having a "seizure"  3 days after taking Vyepti..No new medical problems. \par \par

## 2023-07-27 NOTE — ASSESSMENT
[FreeTextEntry1] : Chronic pain responsive to hydrocodone. Contract signed today. \par Migraines responsive to Nurtec...waiting to hear back from insurance company\par \par Will sw You re second trial of Vyepti.  [Opioids] : Patient was explained in detail about pain control by using opioids. Patient has signed and fully understands our guidelines for medication and drug screening.  Patient understands the side effects of opioids, including, but not limited to, drug tolerance, dependence, potential for addiction. This class of drugs is habit-forming and ELIJAH regulated. The sedative effects of opioids can be potentiated by taking alcohol or any sleeping pills, along with opioids. The decision to drive is patient’s responsibility, as opioids can affect his/her driving ability and ability to concentrate. The long-term place is not clear, however, patient understands that once the pain control optimizes, the goal will be to wean off the opioids. All the issues regarding opioid treatment have been addressed satisfactorily.

## 2023-08-07 ENCOUNTER — APPOINTMENT (OUTPATIENT)
Dept: NEUROLOGY | Facility: CLINIC | Age: 68
End: 2023-08-07

## 2023-08-22 ENCOUNTER — OUTPATIENT (OUTPATIENT)
Dept: OUTPATIENT SERVICES | Facility: HOSPITAL | Age: 68
LOS: 1 days | End: 2023-08-22
Payer: MEDICARE

## 2023-08-22 ENCOUNTER — APPOINTMENT (OUTPATIENT)
Dept: ULTRASOUND IMAGING | Facility: IMAGING CENTER | Age: 68
End: 2023-08-22
Payer: MEDICARE

## 2023-08-22 ENCOUNTER — RESULT REVIEW (OUTPATIENT)
Age: 68
End: 2023-08-22

## 2023-08-22 ENCOUNTER — APPOINTMENT (OUTPATIENT)
Dept: MAMMOGRAPHY | Facility: IMAGING CENTER | Age: 68
End: 2023-08-22
Payer: MEDICARE

## 2023-08-22 DIAGNOSIS — Z08 ENCOUNTER FOR FOLLOW-UP EXAMINATION AFTER COMPLETED TREATMENT FOR MALIGNANT NEOPLASM: ICD-10-CM

## 2023-08-22 DIAGNOSIS — Z90.89 ACQUIRED ABSENCE OF OTHER ORGANS: Chronic | ICD-10-CM

## 2023-08-22 DIAGNOSIS — Z94.5 SKIN TRANSPLANT STATUS: Chronic | ICD-10-CM

## 2023-08-22 PROCEDURE — 77067 SCR MAMMO BI INCL CAD: CPT | Mod: 26

## 2023-08-22 PROCEDURE — 76641 ULTRASOUND BREAST COMPLETE: CPT | Mod: 26,RT

## 2023-08-22 PROCEDURE — 77063 BREAST TOMOSYNTHESIS BI: CPT | Mod: 26

## 2023-08-22 PROCEDURE — 77067 SCR MAMMO BI INCL CAD: CPT

## 2023-08-22 PROCEDURE — 77063 BREAST TOMOSYNTHESIS BI: CPT

## 2023-08-22 PROCEDURE — 76641 ULTRASOUND BREAST COMPLETE: CPT

## 2023-09-29 ENCOUNTER — APPOINTMENT (OUTPATIENT)
Dept: NEUROLOGY | Facility: CLINIC | Age: 68
End: 2023-09-29
Payer: MEDICARE

## 2023-09-29 VITALS
BODY MASS INDEX: 17.93 KG/M2 | HEIGHT: 64 IN | DIASTOLIC BLOOD PRESSURE: 87 MMHG | HEART RATE: 108 BPM | SYSTOLIC BLOOD PRESSURE: 137 MMHG | WEIGHT: 105 LBS

## 2023-09-29 PROCEDURE — 99214 OFFICE O/P EST MOD 30 MIN: CPT

## 2023-10-06 ENCOUNTER — APPOINTMENT (OUTPATIENT)
Dept: PAIN MANAGEMENT | Facility: CLINIC | Age: 68
End: 2023-10-06
Payer: MEDICARE

## 2023-10-06 VITALS
DIASTOLIC BLOOD PRESSURE: 80 MMHG | HEART RATE: 78 BPM | HEIGHT: 64 IN | WEIGHT: 104 LBS | BODY MASS INDEX: 17.75 KG/M2 | SYSTOLIC BLOOD PRESSURE: 149 MMHG

## 2023-10-06 PROCEDURE — 99213 OFFICE O/P EST LOW 20 MIN: CPT

## 2023-10-18 ENCOUNTER — APPOINTMENT (OUTPATIENT)
Dept: CV DIAGNOSITCS | Facility: HOSPITAL | Age: 68
End: 2023-10-18

## 2023-11-05 ENCOUNTER — RX RENEWAL (OUTPATIENT)
Age: 68
End: 2023-11-05

## 2023-11-05 RX ORDER — ROSUVASTATIN CALCIUM 5 MG/1
5 TABLET, FILM COATED ORAL
Qty: 90 | Refills: 3 | Status: ACTIVE | COMMUNITY
Start: 2022-11-14 | End: 1900-01-01

## 2023-11-30 ENCOUNTER — APPOINTMENT (OUTPATIENT)
Dept: CARDIOTHORACIC SURGERY | Facility: CLINIC | Age: 68
End: 2023-11-30
Payer: MEDICARE

## 2023-11-30 ENCOUNTER — OUTPATIENT (OUTPATIENT)
Dept: OUTPATIENT SERVICES | Facility: HOSPITAL | Age: 68
LOS: 1 days | End: 2023-11-30
Payer: SUBSIDIZED

## 2023-11-30 VITALS
RESPIRATION RATE: 15 BRPM | HEART RATE: 89 BPM | DIASTOLIC BLOOD PRESSURE: 78 MMHG | TEMPERATURE: 98.7 F | SYSTOLIC BLOOD PRESSURE: 127 MMHG | OXYGEN SATURATION: 95 %

## 2023-11-30 DIAGNOSIS — I35.0 NONRHEUMATIC AORTIC (VALVE) STENOSIS: ICD-10-CM

## 2023-11-30 DIAGNOSIS — Z90.89 ACQUIRED ABSENCE OF OTHER ORGANS: Chronic | ICD-10-CM

## 2023-11-30 DIAGNOSIS — Z94.5 SKIN TRANSPLANT STATUS: Chronic | ICD-10-CM

## 2023-11-30 PROCEDURE — 93306 TTE W/DOPPLER COMPLETE: CPT | Mod: 26

## 2023-11-30 PROCEDURE — 93306 TTE W/DOPPLER COMPLETE: CPT

## 2023-11-30 PROCEDURE — 99214 OFFICE O/P EST MOD 30 MIN: CPT

## 2023-11-30 RX ORDER — ROSUVASTATIN CALCIUM 10 MG/1
10 TABLET, FILM COATED ORAL
Qty: 90 | Refills: 3 | Status: COMPLETED | COMMUNITY
Start: 2020-02-05 | End: 2023-11-30

## 2023-12-07 ENCOUNTER — NON-APPOINTMENT (OUTPATIENT)
Age: 68
End: 2023-12-07

## 2023-12-12 ENCOUNTER — NON-APPOINTMENT (OUTPATIENT)
Age: 68
End: 2023-12-12

## 2023-12-19 ENCOUNTER — APPOINTMENT (OUTPATIENT)
Dept: PAIN MANAGEMENT | Facility: CLINIC | Age: 68
End: 2023-12-19
Payer: MEDICARE

## 2023-12-19 VITALS
BODY MASS INDEX: 17.75 KG/M2 | WEIGHT: 104 LBS | SYSTOLIC BLOOD PRESSURE: 130 MMHG | HEIGHT: 64 IN | HEART RATE: 94 BPM | DIASTOLIC BLOOD PRESSURE: 79 MMHG

## 2023-12-19 PROCEDURE — 99213 OFFICE O/P EST LOW 20 MIN: CPT

## 2023-12-19 NOTE — ASSESSMENT
[Opioids] : Patient was explained in detail about pain control by using opioids. Patient has signed and fully understands our guidelines for medication and drug screening.  Patient understands the side effects of opioids, including, but not limited to, drug tolerance, dependence, potential for addiction. This class of drugs is habit-forming and ELIJAH regulated. The sedative effects of opioids can be potentiated by taking alcohol or any sleeping pills, along with opioids. The decision to drive is patient's responsibility, as opioids can affect his/her driving ability and ability to concentrate. The long-term place is not clear, however, patient understands that once the pain control optimizes, the goal will be to wean off the opioids. All the issues regarding opioid treatment have been addressed satisfactorily.  [FreeTextEntry1] : 69 y/o F with chronic pain and Migraines  Discussed in detail the nature of chronic pain as well as chronic opioid use for non-malignant pain. Long term use of opioids is not recommended and can potentially lead to hyperalgesia, tolerance and addiction. Additionally, we extensively discussed the risks possible AE when taking opioids alone or in conjunction with benzodiazepines, hypnotics and other sedating medications including respiratory suppression and death. We have discussed the importance of exploring nonopioid pain management including PT, therapeutic massage, stretching, exercise program, acupuncture, CBT as well as topical medications, non-opioid pain medications.  decrease hydrocodone 5/325 mg 4x/day. Consider further gradual taper.  Nurtec through [employer] assist. UDS performed September 2022 and c/w prescribed. Repeated 5/30/2023 c/w rx. I-Stop reviewed, reference #: [id number] No signs of aberrant behavior and will continue to monitor for signs of toxicity. Reminded to continue to avoid alcohol. Patient denies other prescribers. Discussed OD prevention education and prescribed naloxone kit Safe storage of medication was reviewed. Opiate agreement was renewed Jan 26, 2023   The patient had the opportunity to ask questions and all were answered to their satisfaction. The patient verbalized understanding of the management plan and agreed with our recommendations.

## 2023-12-19 NOTE — HISTORY OF PRESENT ILLNESS
[FreeTextEntry1] : 69 y/o F Pmhx HTN, HLD, CAD , severe MR s/p recent Tricuspid valve replacement, seizures s/p temporal lobectomy, breast CA s/p surgery and chemo, migraines who presents today for follow up for chronic pain.  Since her last visit reports no new medical issues, Recent cardio visit and no new med changes.   She is now having headaches,3 x/week described as a sharp pain on the right side of head 7-9/10 that can last hours to days associated with phonophobia.   Triggers include weather, changes to barometric pressure.   She started Nurtec  Chronic pain in her left foot unchanged since last visit that comes and goes 6/10 on average exacerbated by changes in weather.    Prior meds include: Corgard, Venlafaxine, Botox AE, MM, Treximet, MS ,  Vyepti , Nurtec no help.  Current meds: hydrocodone 5/325mg 4x/day; clobazam 40 mg 2x/day, carbamazepine 200 mg TID, MM ( not effective and last used 6 months ago) ,  She is  and lives in [place], children in [place].   brown heike, recent hernia repair. Remodeling kitchen.

## 2023-12-28 ENCOUNTER — APPOINTMENT (OUTPATIENT)
Dept: INTERNAL MEDICINE | Facility: CLINIC | Age: 68
End: 2023-12-28
Payer: MEDICARE

## 2023-12-28 VITALS
SYSTOLIC BLOOD PRESSURE: 126 MMHG | WEIGHT: 105 LBS | BODY MASS INDEX: 17.93 KG/M2 | HEIGHT: 64 IN | OXYGEN SATURATION: 95 % | DIASTOLIC BLOOD PRESSURE: 74 MMHG | HEART RATE: 83 BPM | TEMPERATURE: 97.7 F

## 2023-12-28 DIAGNOSIS — Z00.00 ENCOUNTER FOR GENERAL ADULT MEDICAL EXAMINATION W/OUT ABNORMAL FINDINGS: ICD-10-CM

## 2023-12-28 PROCEDURE — G0439: CPT

## 2023-12-28 NOTE — HEALTH RISK ASSESSMENT
[Fair] :  ~his/her~ mood as fair [No] : No [Two or more falls in past year] : Patient reported two or more falls in the past year [Little interest or pleasure doing things] : 1) Little interest or pleasure doing things [Feeling down, depressed, or hopeless] : 2) Feeling down, depressed, or hopeless [0] : 2) Feeling down, depressed, or hopeless: Not at all (0) [PHQ-2 Negative - No further assessment needed] : PHQ-2 Negative - No further assessment needed [de-identified] : minimal [de-identified] : regular  [[id number]] : 0 [Patient reported mammogram was normal] : Patient reported mammogram was normal [Change in mental status noted] : No change in mental status noted [None] : None [] :  [Sexually Active] : sexually active [Feels Safe at Home] : Feels safe at home [Fully functional (bathing, dressing, toileting, transferring, walking, feeding)] : Fully functional (bathing, dressing, toileting, transferring, walking, feeding) [Reports changes in hearing] : Reports no changes in hearing [Reports changes in vision] : Reports no changes in vision [Smoke Detector] : smoke detector [Reports changes in dental health] : Reports no changes in dental health [Safety elements used in home] : safety elements used in home [Seat Belt] :  uses seat belt [MammogramDate] : 8/23 [ColonoscopyDate] : 11/27 [ColonoscopyComments] : polyp  [de-identified] : needs help w/ transport / cooking [Name: ___] : Health Care Proxy's Name: [unfilled]  [Relationship: ___] : Relationship: [unfilled] [AdvancecareDate] : 12/28/23 [Current] : Current [20 or more] : 20 or more

## 2023-12-28 NOTE — PHYSICAL EXAM
[No Acute Distress] : no acute distress [Well Nourished] : well nourished [Normal Voice/Communication] : normal voice/communication [Normal Sclera/Conjunctiva] : normal sclera/conjunctiva [Normal Outer Ear/Nose] : the outer ears and nose were normal in appearance [No JVD] : no jugular venous distention [No Edema] : there was no peripheral edema [Normal] : no rash

## 2023-12-28 NOTE — HISTORY OF PRESENT ILLNESS
[Parent] : parent [FreeTextEntry1] : wellness check   complex h/o refractory epilepsy , MR- ( S/P repair) / HL / severe migraines , ch pain  following w/ multiple specialist  reviewed  neuro / heme- onc / pain management  note  breast cancer survivor   OP - was on Prolia - not had for a while

## 2023-12-28 NOTE — ASSESSMENT
[FreeTextEntry1] : 1) Wellness check   - Diet / exercise discussed  - home safety addressed  - check labs - optho-  - mammogram UTD - need PAP / CRC Screening - needs BMD - F/U endo  - vaccines UTD flu and Covid shot / RSV

## 2023-12-28 NOTE — REVIEW OF SYSTEMS
[Recent Change In Weight] : ~T recent weight change [Back Pain] : back pain [Fainting] : fainting [Anxiety] : anxiety [Negative] : Integumentary

## 2023-12-28 NOTE — COUNSELING
[Fall prevention counseling provided] : Fall prevention counseling provided [Adequate lighting] : Adequate lighting [No throw rugs] : No throw rugs [Yes] : Risk of tobacco use and health benefits of smoking cessation discussed: Yes [Cessation strategies including cessation program discussed] : Cessation strategies including cessation program discussed [ - Annual Lung Cancer Screening/Share Decision Making Discussion] : Annual Lung Cancer Screening/Share Decision Making Discussion. (I have advised this patient to have a Low Dose CT (LDCT) scan of the lungs and have discussed the following with the patient in a shared decision making discussion:   Benefits of Detection and Early Treatment: There is adequate evidence that annual screening for lung cancer with LDCT in a population of high-risk persons can prevent a substantial number of lung cancer-related deaths. The magnitude of benefit depends on the individual patient's risk for lung cancer, as those who are at highest risk are most likely to benefit. Screening cannot prevent most lung cancer-related deaths, and does not replace smoking cessation. Harms of Detection and Early Intervention and Treatment: The harms associated with LDCT screening include false-negative and false-positive results, incidental findings, over diagnosis, and radiation exposure. False-positive LDCT results occur in a substantial proportion of screened persons; 95% of all positive results do not lead to a diagnosis of cancer. In a high-quality screening program, further imaging can resolve most false-positive results; however, some patients may require invasive procedures. Radiation harms, including cancer resulting from cumulative exposure to radiation, vary depending on the age at the start of screening; the number of scans received; and the person's exposure to other sources of radiation, particularly other medical imaging.)

## 2023-12-29 LAB
ALBUMIN SERPL ELPH-MCNC: 4.1 G/DL
ALP BLD-CCNC: 114 U/L
ALT SERPL-CCNC: 11 U/L
ANION GAP SERPL CALC-SCNC: 19 MMOL/L
AST SERPL-CCNC: 18 U/L
BILIRUB SERPL-MCNC: 0.2 MG/DL
BUN SERPL-MCNC: 10 MG/DL
CALCIUM SERPL-MCNC: 9.2 MG/DL
CHLORIDE SERPL-SCNC: 105 MMOL/L
CO2 SERPL-SCNC: 18 MMOL/L
CREAT SERPL-MCNC: 0.53 MG/DL
EGFR: 101 ML/MIN/1.73M2
GLUCOSE SERPL-MCNC: 102 MG/DL
HCT VFR BLD CALC: 38.2 %
HGB BLD-MCNC: 12.8 G/DL
MCHC RBC-ENTMCNC: 33.1 PG
MCHC RBC-ENTMCNC: 33.5 GM/DL
MCV RBC AUTO: 98.7 FL
PLATELET # BLD AUTO: 182 K/UL
POTASSIUM SERPL-SCNC: 4.3 MMOL/L
PROT SERPL-MCNC: 7.3 G/DL
RBC # BLD: 3.87 M/UL
RBC # FLD: 13.4 %
SODIUM SERPL-SCNC: 142 MMOL/L
WBC # FLD AUTO: 6.68 K/UL

## 2024-02-02 ENCOUNTER — APPOINTMENT (OUTPATIENT)
Dept: NEUROLOGY | Facility: CLINIC | Age: 69
End: 2024-02-02
Payer: MEDICARE

## 2024-02-02 VITALS
WEIGHT: 105 LBS | BODY MASS INDEX: 17.93 KG/M2 | HEART RATE: 76 BPM | SYSTOLIC BLOOD PRESSURE: 149 MMHG | HEIGHT: 64 IN | DIASTOLIC BLOOD PRESSURE: 82 MMHG

## 2024-02-02 PROCEDURE — G2211 COMPLEX E/M VISIT ADD ON: CPT

## 2024-02-02 PROCEDURE — 99214 OFFICE O/P EST MOD 30 MIN: CPT
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
## 2024-02-02 NOTE — DISCUSSION/SUMMARY
[FreeTextEntry1] : 68 year old woman with complex partial refractory epilepsy on Klonopin and CBZ s/p surgery over 20 years ago with chronic headache as well.  -The VEEG showed left sided seizures from the Fronto-temporal region.  We discussed the possibility of epilepsy sx in detail.  She had neuropsych testing which showed diffuse cerebral dysfunction, right greater than left.  Plan: - Taking Clobazam 40mg bid and doing better - Discussed risks of CBZ in osteoporosis, but risk of stopping medication high in increasing her seizures.  She knows risk of fracture at this time and is being treated by her PMD. Did poorly on Carbatrol and back on CBZ. - encouraged 3 servings Ca+ in diet and low weight bearing exercises - reviewed seizure triggers - f/u CBZ  (CBC,CMP) next visit. Done PMD and stable in past. -follow up in 2-3 months.  Total time 32 min  . [Medically Refractory (seizure within the last year)] : Medically Refractory (seizure within the last year) [Complex Partial] : complex partial [Symptomatic] : symptomatic [Risks Associated with Driving/[place] Law] : As per my usual protocol, the patient was advised in regards to risks and driving privileges associated with the [place] Guidelines.  [Safety Recommendations] : The patient was advised in regards to the risk of seizures and general seizure safety recommendations including not to be bathing alone, climbing to high places and operating heavy machinery. [Compliance with Medications] : The importance of compliance with medications was reinforced. [Bone Health Education] : Patient was educated in regards to bone health and an increased risk of osteoporosis in patients with epilepsy. [Sleep Hygiene/Sleep Disruption Risks] : Sleep hygiene and the risks of sleep disruption were discussed. [Surgical Options/Risks/Benefits] : Surgical options/risks/benefits for intractable epilepsy were discussed. [Risk of Death] : Risk of death associated with seizures / SUDEP was discussed. [Opioids] : Patient was explained in detail about pain control by using opioids. Patient has signed and fully understands our guidelines for medication and drug screening.  Patient understands the side effects of opioids, including, but not limited to, drug tolerance, dependence, potential for addiction. This class of drugs is habit-forming and ELIJAH regulated. The sedative effects of opioids can be potentiated by taking alcohol or any sleeping pills, along with opioids. The decision to drive is patient's responsibility, as opioids can affect his/her driving ability and ability to concentrate. The long-term place is not clear, however, patient understands that once the pain control optimizes, the goal will be to wean off the opioids. All the issues regarding opioid treatment have been addressed satisfactorily.

## 2024-02-06 ENCOUNTER — APPOINTMENT (OUTPATIENT)
Dept: SURGICAL ONCOLOGY | Facility: CLINIC | Age: 69
End: 2024-02-06
Payer: MEDICARE

## 2024-02-06 VITALS
DIASTOLIC BLOOD PRESSURE: 80 MMHG | HEIGHT: 64 IN | HEART RATE: 80 BPM | WEIGHT: 105 LBS | OXYGEN SATURATION: 98 % | SYSTOLIC BLOOD PRESSURE: 154 MMHG | BODY MASS INDEX: 17.93 KG/M2

## 2024-02-06 PROCEDURE — 99213 OFFICE O/P EST LOW 20 MIN: CPT

## 2024-02-06 NOTE — ASSESSMENT
[FreeTextEntry1] : Imp: No evidence of new or recurrent lesions. Breast imaging failed to identify any suspicious lesions. No suspicious lesions on exam  Plan: Continue yearly surveillance with [facility]/US  8/2024  All medical entries were at my, Dr. Calvin Landaverde, direction. I have reviewed the chart and agree that the record accurately reflects my personal performance of the history, physical exam, assessment and plan. Our office Nurse Practitioner was present of the duration of the office visit.

## 2024-02-06 NOTE — HISTORY OF PRESENT ILLNESS
[de-identified] : 68 year old female presents for a  follow up visit for her annual breast exam.  Her history is notable for a left breast lumpectomy (1998) for stage 1 left breast cancer with adjuvant chemotherapy and radiation therapy. She suffered a recurrence and had re excision twice, and then completed 5 years of tamoxifen and 5 years of Femara under the supervision of Dr. Murray. Family history of breast cancer involves her maternal aunt. Patient is BRCA Negative.   Her most recent screening mammo/US was done on 8/22/23 which revealed no evidence of malignancy BI-RADS 2   Denies palpable breast masses, nipple discharge, skin changes, inversion or breast pain.  Pt. is s/p recent tricuspid valve replacement on 10/18/2021. Has a hx of migraines. Continues f/u with Neuro and Cardiology.

## 2024-02-06 NOTE — PHYSICAL EXAM
[Normal] : supple, no neck mass and thyroid not enlarged [Normal Supraclavicular Lymph Nodes] : normal supraclavicular lymph nodes [Normal Axillary Lymph Nodes] : normal axillary lymph nodes [Normal] : oriented to person, place and time, with appropriate affect [FreeTextEntry1] : GS present for exam  [de-identified] : Normal S1,S2. Regular rate and rhythm  [de-identified] : Complete breast exam performed in supine and upright position. No palpable masses, tenderness, nipple discharge, inversion, deviation or enlarged axillary or supraclavicular lymph nodes bilaterally.  [de-identified] : normal respiratory effort, lungs clear to auscultation

## 2024-02-23 ENCOUNTER — APPOINTMENT (OUTPATIENT)
Dept: CARDIOLOGY | Facility: CLINIC | Age: 69
End: 2024-02-23
Payer: MEDICARE

## 2024-02-23 ENCOUNTER — NON-APPOINTMENT (OUTPATIENT)
Age: 69
End: 2024-02-23

## 2024-02-23 VITALS — HEART RATE: 75 BPM | SYSTOLIC BLOOD PRESSURE: 138 MMHG | OXYGEN SATURATION: 96 % | DIASTOLIC BLOOD PRESSURE: 68 MMHG

## 2024-02-23 VITALS — HEART RATE: 75 BPM | SYSTOLIC BLOOD PRESSURE: 137 MMHG | OXYGEN SATURATION: 96 % | DIASTOLIC BLOOD PRESSURE: 69 MMHG

## 2024-02-23 VITALS — SYSTOLIC BLOOD PRESSURE: 136 MMHG | DIASTOLIC BLOOD PRESSURE: 69 MMHG | OXYGEN SATURATION: 97 % | HEART RATE: 79 BPM

## 2024-02-23 VITALS — OXYGEN SATURATION: 97 % | DIASTOLIC BLOOD PRESSURE: 75 MMHG | SYSTOLIC BLOOD PRESSURE: 136 MMHG | HEART RATE: 80 BPM

## 2024-02-23 VITALS — DIASTOLIC BLOOD PRESSURE: 72 MMHG | OXYGEN SATURATION: 97 % | HEART RATE: 82 BPM | SYSTOLIC BLOOD PRESSURE: 123 MMHG

## 2024-02-23 VITALS
DIASTOLIC BLOOD PRESSURE: 69 MMHG | BODY MASS INDEX: 17.24 KG/M2 | SYSTOLIC BLOOD PRESSURE: 144 MMHG | WEIGHT: 101 LBS | HEIGHT: 64 IN | OXYGEN SATURATION: 97 % | HEART RATE: 76 BPM | RESPIRATION RATE: 16 BRPM

## 2024-02-23 VITALS — HEART RATE: 73 BPM | DIASTOLIC BLOOD PRESSURE: 67 MMHG | SYSTOLIC BLOOD PRESSURE: 130 MMHG | OXYGEN SATURATION: 96 %

## 2024-02-23 VITALS — OXYGEN SATURATION: 96 % | DIASTOLIC BLOOD PRESSURE: 76 MMHG | HEART RATE: 79 BPM | SYSTOLIC BLOOD PRESSURE: 138 MMHG

## 2024-02-23 DIAGNOSIS — I25.10 ATHEROSCLEROTIC HEART DISEASE OF NATIVE CORONARY ARTERY W/OUT ANGINA PECTORIS: ICD-10-CM

## 2024-02-23 DIAGNOSIS — I49.3 VENTRICULAR PREMATURE DEPOLARIZATION: ICD-10-CM

## 2024-02-23 DIAGNOSIS — J44.9 CHRONIC OBSTRUCTIVE PULMONARY DISEASE, UNSPECIFIED: ICD-10-CM

## 2024-02-23 DIAGNOSIS — Z95.818 OTHER SPECIFIED POSTPROCEDURAL STATES: ICD-10-CM

## 2024-02-23 DIAGNOSIS — R07.89 OTHER CHEST PAIN: ICD-10-CM

## 2024-02-23 DIAGNOSIS — Z71.3 DIETARY COUNSELING AND SURVEILLANCE: ICD-10-CM

## 2024-02-23 DIAGNOSIS — Z98.890 OTHER SPECIFIED POSTPROCEDURAL STATES: ICD-10-CM

## 2024-02-23 DIAGNOSIS — Z71.6 TOBACCO ABUSE COUNSELING: ICD-10-CM

## 2024-02-23 DIAGNOSIS — I95.1 ORTHOSTATIC HYPOTENSION: ICD-10-CM

## 2024-02-23 DIAGNOSIS — R63.6 UNDERWEIGHT: ICD-10-CM

## 2024-02-23 DIAGNOSIS — Z95.4 PRESENCE OF OTHER HEART-VALVE REPLACEMENT: ICD-10-CM

## 2024-02-23 PROCEDURE — G2211 COMPLEX E/M VISIT ADD ON: CPT

## 2024-02-23 PROCEDURE — 99215 OFFICE O/P EST HI 40 MIN: CPT

## 2024-02-23 PROCEDURE — G2212 PROLONG OUTPT/OFFICE VIS: CPT

## 2024-02-23 PROCEDURE — 93000 ELECTROCARDIOGRAM COMPLETE: CPT

## 2024-02-24 RX ORDER — ASCORBIC ACID
200 CRYSTALS ORAL DAILY
Qty: 90 | Refills: 0 | Status: DISCONTINUED | COMMUNITY
Start: 2023-04-05 | End: 2024-02-24

## 2024-02-24 RX ORDER — EPTINEZUMAB-JJMR 100 MG/ML
100 INJECTION INTRAVENOUS
Qty: 1 | Refills: 3 | Status: DISCONTINUED | COMMUNITY
Start: 2023-01-26 | End: 2024-02-24

## 2024-02-24 NOTE — COUNSELING
[Yes] : Risk of tobacco use and health benefits of smoking cessation discussed: Yes [Cessation strategies including cessation program discussed] : Cessation strategies including cessation program discussed [Use of nicotine replacement therapies and other medications discussed] : Use of nicotine replacement therapies and other medications discussed [Encouraged to pick a quit date and identify support needed to quit] : Encouraged to pick a quit date and identify support needed to quit [No] : Not willing to quit smoking [Smoking Cessation Program Referral] : Smoking Cessation Program Referral  [FreeTextEntry3] : 15 [FreeTextEntry2] : [facility] Smoking Cessation Program

## 2024-02-24 NOTE — PHYSICAL EXAM
[General Appearance - Well Developed] : well developed [Normal Appearance] : normal appearance [Well Groomed] : well groomed [General Appearance - Well Nourished] : well nourished [No Deformities] : no deformities [General Appearance - In No Acute Distress] : no acute distress [Normal Conjunctiva] : the conjunctiva exhibited no abnormalities [Eyelids - No Xanthelasma] : the eyelids demonstrated no xanthelasmas [Normal Oral Mucosa] : normal oral mucosa [No Oral Pallor] : no oral pallor [No Oral Cyanosis] : no oral cyanosis [Normal Jugular Venous A Waves Present] : normal jugular venous A waves present [Normal Jugular Venous V Waves Present] : normal jugular venous V waves present [No Jugular Venous [place] A Waves] : no jugular venous [place] A waves [Respiration, Rhythm And Depth] : normal respiratory rhythm and effort [Prolonged Exp Time] : with prolonged expiratory time [Exaggerated Use Of Accessory Muscles For Inspiration] : no accessory muscle use [Increased E/I Ratio] : with increased expiratory/inspiratory ratio [Abdomen Soft] : soft [Abdomen Tenderness] : non-tender [Abdomen Mass (___ Cm)] : no abdominal mass palpated [Abnormal Walk] : normal gait [Gait - Sufficient For Exercise Testing] : the gait was sufficient for exercise testing [Nail Clubbing] : no clubbing of the fingernails [Cyanosis, Localized] : no localized cyanosis [Petechial Hemorrhages (___cm)] : no petechial hemorrhages [Skin Color & Pigmentation] : normal skin color and pigmentation [No Skin Ulcers] : no skin ulcer [No Venous Stasis] : no venous stasis [No Xanthoma] : no  xanthoma was observed [Oriented To Time, Place, And Person] : oriented to person, place, and time [Mood] : the mood was normal [Affect] : the affect was normal [5th Left ICS - MCL] : palpated at the 5th LICS in the midclavicular line [Normal] : normal [No Precordial Heave] : no precordial heave was noted [Normal Rate] : normal [Rhythm Regular] : regular [Normal S1] : normal S1 [No Gallop] : no gallop heard [Normal S2] : normal S2 [I] : a grade 1 [Bgodancendo] : osito [Blowing] : blowing [Medium] : medium pitched [Mid] : mid [] : decreases with inspiration [2+] : left 2+ [No Abnormalities] : the abdominal aorta was not enlarged and no bruit was heard [No Pitting Edema] : no pitting edema present [FreeTextEntry1] : L ankle circumf  6.75" ; R ankle 6.75" not over socks. Prior 6.0" and 7.0" respectively [S3] : no S3 [Apical Thrill] : no thrill palpable at the apex [S4] : no S4 [Click] : no click [Right Carotid Bruit] : no bruit heard over the right carotid [Pericardial Rub] : no pericardial rub [Left Carotid Bruit] : no bruit heard over the left carotid [Right Femoral Bruit] : no bruit heard over the right femoral artery [Lt] : no varicose veins of the left leg [Rt] : no varicose veins of the right leg [Left Femoral Bruit] : no bruit heard over the left femoral artery

## 2024-02-24 NOTE — REASON FOR VISIT
[Structural Heart and Valve Disease] : structural heart and valve disease [Follow-Up - Clinic] : a clinic follow-up of [Cardiomyopathy] : cardiomyopathy [Hyperlipidemia] : hyperlipidemia [Coronary Artery Disease] : coronary artery disease [Mitral Regurgitation] : mitral regurgitation [FreeTextEntry1] : isolated elevated BP

## 2024-02-24 NOTE — REVIEW OF SYSTEMS
[Under Stress] : under stress [Negative] : Integumentary [Fever] : no fever [Headache] : no headache [Feeling Fatigued] : not feeling fatigued [Chills] : no chills [Weight Gain (___ Lbs)] : no recent weight gain [Weight Loss (___ Lbs)] : no recent weight loss [de-identified] : see HPI [de-identified] : see HPI

## 2024-02-24 NOTE — ADDENDUM
[FreeTextEntry1] : I spent 60 minutes face to face time with the patient  from 13:30 to 14:30  on 2/23/24. Thirty minutes were devoted to patient counseling with emphasis on maintenance smoking cessation (see Narrative section). Approx 15 min were devoted to smoking cessation, with literature provided regarding [facility] Smoking Cessation Program. Prior to this visit, I reviewed office records of surgical oncology, neurology and structural heart and valve clinic. I personally reviewed images and findings from TTE 11/30/23. This preparation took 20 minutes. Thus, total visit time was 80 minutes.
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
## 2024-02-24 NOTE — CARDIOLOGY SUMMARY
[de-identified] : 2/23/24, normal sinus rhythm at 76 bpm, with non-specific ST-segment and T-wave changes. Prior ECG 4/5/23 showed normal sinus rhythm at 72 bpm with non-specific ST-segment and T-wave changes. Rhythm / RR - interval analysis 2/23/24 observed 118 beats over 90 sec with mean HR 78 bpm; mean  ms (676-850); Max/Min 125%; RR SD 46 amd RR CV 6%/ There were no PVCs or PACs [de-identified] : 6/18/21, small moderate anteroapical and distal inferior defects that are fixed, consistent with infarct. No segmental wall motion abnormality; LVEF 61%. There was no significant change compared to prior study 3/7/17. [de-identified] : RHC / LHC: 6/2/21, Ostial LM 30 % stenosis. pLAD 60% stenosis in the distal third of the vessel segment, at the origin of D1. dLCX 50% stenosis in the proximal third of the vessel segment. p RCA 60 % stenosis. mRCA 60 % stenosis [de-identified] : 11/30/23, The LV cavity was normal in size. LV systolic function was normal with EF 54 %. There was hypokinesis of the basal inferior and basal inferolateral walls. All remaining wall segments were normal. LV global longitudinal strain was -11.1 % which was abnormal (normal > -16%). Images were acquired with HR 78 bpm /79 mmHg. RV cavity was normal in size and systolic function. DAGO ACE RAND on A2/P2. The device was well seated and attached. The transmitral peak gradient was 11.6 mmHg and mean gradient 4.00 mmHg with HR 88 bpm. There was mild mitral regurgitation. Trace tricuspid regurgitation. Normal estimated pulmonary pressures. There was no pericardial effusion. Compared to the TTE 11/14/2022 there have been no significant interval changes

## 2024-02-24 NOTE — DISCUSSION/SUMMARY
[FreeTextEntry1] : SMOKING CESSATION: We all know the health risks of smoking, and most of us know that kicking the habit is the single biggest improvement to health a smoker can make. But that doesn't make it any easier to kick the habit. Whether you're a teen smoker or a lifetime pack-a-day smoker, quitting can be tough. To increase your chances of success, you need to be motivated, have social support, an understanding of what to expect, and a personal game plan. It is possible to learn how to replace your smoking habits, manage your cravings, and join the millions of people who have kicked the habit for good. Smoking tobacco is both a psychological habit and a physical addiction. The act of smoking is ingrained as a daily ritual and, at the same time, the nicotine from cigarettes provides a temporary, and addictive, high. Eliminating that regular fix of nicotine will cause your body to experience physical withdrawal symptoms and cravings. To successfully quit smoking, you'll need to address both the habit and the addiction by changing your behavior and dealing with nicotine withdrawal symptoms. Relieving unpleasant and overwhelming feelings without cigarettes Managing unpleasant feelings such as stress, depression, loneliness, fear, and anxiety are some of the most common reasons why adults smoke. When you have a bad day, it can seem like your cigarettes are your only friend. Smoking can temporarily make feelings such as sadness, stress, anxiety, depression, and boredom evaporate into thin air. As much comfort as cigarettes provide, though, it's important to remember that there are healthier (and more effective) ways to keep unpleasant feelings in check. These may include exercising, meditating, using sensory relaxation strategies, and practicing simple breathing exercises.  For many people, an important aspect of quitting smoking is to find alternate ways to handle these difficult feelings without smoking. Even when cigarettes are no longer a part of your life, the painful and unpleasant feelings that may have prompted you to smoke in the past will still remain. So, it's worth spending some time thinking about the different ways you intend to deal with stressful situations and the daily irritations that would normally have you reaching for a cigarette. Tailoring a personal game plan to your specific needs and desires can be a big help. List the reasons why you want to quit and then keep copies of the list in the places where you'd normally keep your cigarettes, such as in your jacket, purse, or car. Your reasons for quitting smoking might include: I will feel healthier and have more energy, whiter teeth and fresher breath. I will lower my risk for cancer, heart attacks, strokes, early death, cataracts, and skin wrinkling. I will make myself and my partner, friends, and family proud of me. I will no longer expose my children and others to the dangers of my second-hand smoke. I will have a healthier baby (If you or your partner is pregnant). I will have more money to spend. I won't have to worry: "When will I get to smoke next?" Questions to ask yourself To successfully detach from smoking, you will need to identify and address your smoking habits, the true nature of your dependency, and the techniques that work for you. These types of questions can help: Do you feel the need to smoke at every meal? Are you more of a social smoker? Is it a very bad addiction (more than a pack a day)? Or would a simple nicotine patch do the job? Is your cigarette smoking linked to other addictions, such as alcohol or gambling? Are you open to hypnotherapy and/or acupuncture? Are you someone who is open to talking about your addiction with a therapist or counselor? Are you interested in getting into a fitness program? Take the time to think of what kind of smoker you are, which moments of your life call for a cigarette, and why. This will help you to identify which tips, techniques or therapies may be most beneficial for you.  Start your stop smoking plan with START S = Set a quit date. T = Tell family, friends, and co-workers that you plan to quit. A = Anticipate and plan for the challenges you'll face while quitting. R = Remove cigarettes and other tobacco products from your home, car, and work. T = Talk to your doctor about getting help to quit.  After quitting, you may feel dizzy, restless, or even have strong headaches because you're lacking the immediate release of sugar that comes from nicotine. You may also have a bigger appetite. These sugar-related cravings should only last a few days until your body adjusts so keep your sugar levels a bit higher than usual on those days by drinking plenty of juice (unless you're a diabetic). It will help prevent the craving symptoms and help your body re-adjust back to normal. Tips for managing other cigarette cravings Cravings associated with meals For some smokers, ending a meal means lighting up, and the prospect of giving that up may appear daunting. TIP: replace that moment after a meal with something such as a piece of fruit, a (healthy) dessert, a square of chocolate, or a stick of gum.  Alcohol and cigarettes Many people have a habit of smoking when they have an alcoholic drink. TIP: try non-alcoholic drinks, or try drinking only in bars, restaurant-bars, or friends' houses where smoking inside is prohibited. Or try snacking on nuts and chips, or chewing on a straw or cocktail stick.  Cravings associated with social smoking When friends, family, and co-workers smoke around you, it is doubly difficult to quit or avoid relapse. TIP: Your social circles need to know that you are changing your habits so talk about your decision to quit. Let them know they won't be able to smoke when you're in the car with them or taking a coffee break together.   In your workplace, don't take all your coffee breaks with smokers only, do something else instead, or find non-smokers to have your breaks with. Additional tips to deal with cravings and withdrawal symptoms Stay active: Keep yourself distracted and occupied, go for walks. Keep your hands/fingers busy: Squeeze balls, pencils, or paper clips are good substitutes to satisfy that need for tactile stimulation. Keep your mind busy: Read a book or magazine, listen to some music you love. Find an oral substitute: Keep other things around to pop in your mouth when you're craving a cigarette. Good choices include mints, hard candy, carrot or celery sticks, gum, and sunflower seeds. Drink lots of water: Flushing toxins from your body minimizes withdrawal symptoms and helps cravings pass faster. Look for new ways to relax and to cope with depression or anxiety: There are a lot of ways to improve your mood without smoking.   Finding the resources and support to quit smoking There are many different methods that have successfully helped people to quit smoking, including: Quitting smoking cold turkey. Systematically decreasing the number of cigarettes you smoke. Reducing your intake of nicotine gradually over time. Using nicotine replacement therapy or non-nicotine medications to reduce withdrawal symptoms. Utilizing nicotine support groups. Trying hypnosis, acupuncture, or counseling using cognitive behavioral techniques. You may be successful with the first method you try. More likely, you'll have to try a number of different methods or a combination of treatments to find the ones that work best for you.  Medication therapy Smoking cessation medications can ease withdrawal symptoms and reduce cravings, and are most effective when used as part of a comprehensive stop smoking program monitored by your physician. Talk to your doctor about your options and whether an anti-smoking medication is right for you. U.S. Food and Drug Administration (FDA) approved options are:   Nicotine Replacement Therapy Nicotine replacement therapy involves "replacing" cigarettes with other nicotine substitutes, such as nicotine gum or a nicotine patch. It works by delivering small and steady doses of nicotine into the body to relieve some of the withdrawal symptoms without the tars and poisonous gases found in cigarettes. This type of treatment helps smokers focus on breaking their psychological addiction and makes it easier to concentrate on learning new behaviors and coping skills.  Non-Nicotine Medication These medications help you stop smoking by reducing cravings and withdrawal symptoms without the use of nicotine. Medications such as bupropion (Zyban) and varenicline (Chantix) are intended for short-term use only.  Non-medication therapies There are several things you can do to stop smoking that don't involve nicotine replacement therapy or prescription medications: Hypnosis A popular option that has produced good results. Forget anything you may have seen from stage hypnotists, hypnosis works by getting you into a deeply relaxed state where you are open to suggestions that strengthen your resolve to quit smoking and increase your negative feelings toward cigarettes. Ask your doctor to recommend a qualified smoking cessation hypnotherapist in your area or refer to the American Society of Clinical Hypnosis (ASCH) for guidelines on selecting a qualified professional. Acupuncture One of the oldest known medical techniques, acupuncture is believed to work by triggering the release of endorphins (natural pain relievers) that allow the body to relax. As a smoking cessation aid, acupuncture can be helpful in managing smoking withdrawal symptoms. Ask your doctor for a referral or search for a local practitioner at the American Association of Acupuncture and [place] [employer] (AAAOM). Behavioral Therapy Nicotine addiction is related to the habitual behaviors (the "rituals") involved in smoking. Behavior therapy focuses on learning new coping skills and breaking those habits. The American Lung Association offers a free online smoking cessation program that focuses on behavioral change. To find a local behavioral therapist, check with your doctor or search at the Association for Behavioral and Cognitive Therapies (ABCT). Motivational Therapies Self-help books and websites can provide a number of ways to motivate yourself to quit smoking. One well known example is calculating the monetary savings. Some people have been able to find the motivation to quit just by calculating how much money they will save after they quit. It may be enough to pay for a summer vacation.  [facility] [address] [place] [phone] [email or web address]   WEIGHT GOALS: Category				BMI range - kg/m2	BMI Prime Very severely underweight		less than 15		less than 0.60	 Severely underweight			from 15.0 to 16.0		from 0.60 to 0.64	 Underweight				from 16.0 to 18.5		from 0.64 to 0.74	 Normal (healthy weight)			from 18.5 to 25		from 0.74 to 1.0	 Overweight				from 25 to 30		from 1.0 to 1.2	 Obese Class I (Moderately obese)		from 30 to 35		from 1.2 to 1.4	 Obese Class II (Severely obese)		from 35 to 40		from 1.4 to 1.6	 Obese Class III (Very severely obese)	over 40	over 1.6				  Your current weight is 101 lbs (99 lbs on 4/5/23; 100 lbs on 2/5/2020; 105 lbs on 7/31/19; 85 lbs on 1/16/19; 81 lbs on 6/21/18). Given current weight and height 5'4", your calculated body mass index (BMI) is 17.34 kg/sqm. Normal BMI is 18.5-25 kg/sqm. Thus, current weight is in the underweight category. Abdominal waist circumference is measured at the level of the umbilicus and is thus not a pants waist measurement. Your current abdominal waist circumference is 31" in (31.5" on 4/5/23; 31 in on 2/24/21). Normal abdominal waist circumference is < 32 inches for women and < 37 inches for men.

## 2024-03-01 LAB
25(OH)D3 SERPL-MCNC: 23.6 NG/ML
ALBUMIN SERPL ELPH-MCNC: 4.2 G/DL
ALP BLD-CCNC: 121 U/L
ALT SERPL-CCNC: 7 U/L
ANION GAP SERPL CALC-SCNC: 12 MMOL/L
APPEARANCE: CLEAR
AST SERPL-CCNC: 15 U/L
BACTERIA: ABNORMAL /HPF
BILIRUB SERPL-MCNC: 0.2 MG/DL
BILIRUBIN URINE: NEGATIVE
BLOOD URINE: NEGATIVE
BUN SERPL-MCNC: 9 MG/DL
CALCIUM SERPL-MCNC: 9.3 MG/DL
CAST: 1 /LPF
CHLORIDE SERPL-SCNC: 104 MMOL/L
CHOLEST SERPL-MCNC: 147 MG/DL
CK SERPL-CCNC: 58 U/L
CO2 SERPL-SCNC: 24 MMOL/L
COLOR: YELLOW
CREAT SERPL-MCNC: 0.55 MG/DL
CREAT SPEC-SCNC: 113 MG/DL
EGFR: 100 ML/MIN/1.73M2
EPITHELIAL CELLS: 11 /HPF
ESTIMATED AVERAGE GLUCOSE: 100 MG/DL
GLUCOSE QUALITATIVE U: NEGATIVE MG/DL
GLUCOSE SERPL-MCNC: 108 MG/DL
HBA1C MFR BLD HPLC: 5.1 %
HCT VFR BLD CALC: 40.3 %
HDLC SERPL-MCNC: 52 MG/DL
HGB BLD-MCNC: 13.1 G/DL
KETONES URINE: NEGATIVE MG/DL
LDLC SERPL CALC-MCNC: 80 MG/DL
LDLC SERPL DIRECT ASSAY-MCNC: 80 MG/DL
LEUKOCYTE ESTERASE URINE: NEGATIVE
MAGNESIUM SERPL-MCNC: 2.1 MG/DL
MCHC RBC-ENTMCNC: 32.5 GM/DL
MCHC RBC-ENTMCNC: 32.8 PG
MCV RBC AUTO: 100.8 FL
MICROALBUMIN 24H UR DL<=1MG/L-MCNC: 1.2 MG/DL
MICROALBUMIN/CREAT 24H UR-RTO: NORMAL MG/G
MICROSCOPIC-UA: NORMAL
NITRITE URINE: POSITIVE
NONHDLC SERPL-MCNC: 95 MG/DL
NT-PROBNP SERPL-MCNC: 137 PG/ML
PH URINE: 5.5
PLATELET # BLD AUTO: 211 K/UL
POTASSIUM SERPL-SCNC: 4.4 MMOL/L
PROT SERPL-MCNC: 7.2 G/DL
PROTEIN URINE: NEGATIVE MG/DL
RBC # BLD: 4 M/UL
RBC # FLD: 13.5 %
RED BLOOD CELLS URINE: 2 /HPF
SODIUM SERPL-SCNC: 140 MMOL/L
SPECIFIC GRAVITY URINE: 1.03
T4 FREE SERPL-MCNC: 0.8 NG/DL
TRIGL SERPL-MCNC: 77 MG/DL
TSH SERPL-ACNC: 1.62 UIU/ML
UROBILINOGEN URINE: 0.2 MG/DL
WBC # FLD AUTO: 7.83 K/UL
WHITE BLOOD CELLS URINE: 2 /HPF

## 2024-03-07 ENCOUNTER — NON-APPOINTMENT (OUTPATIENT)
Age: 69
End: 2024-03-07

## 2024-04-02 ENCOUNTER — OUTPATIENT (OUTPATIENT)
Dept: OUTPATIENT SERVICES | Facility: HOSPITAL | Age: 69
LOS: 1 days | Discharge: HOME | End: 2024-04-02

## 2024-04-02 ENCOUNTER — APPOINTMENT (OUTPATIENT)
Dept: PAIN MANAGEMENT | Facility: CLINIC | Age: 69
End: 2024-04-02
Payer: MEDICARE

## 2024-04-02 ENCOUNTER — LABORATORY RESULT (OUTPATIENT)
Age: 69
End: 2024-04-02

## 2024-04-02 VITALS
BODY MASS INDEX: 17.24 KG/M2 | WEIGHT: 101 LBS | SYSTOLIC BLOOD PRESSURE: 128 MMHG | DIASTOLIC BLOOD PRESSURE: 77 MMHG | HEART RATE: 88 BPM | HEIGHT: 64 IN

## 2024-04-02 DIAGNOSIS — Z90.89 ACQUIRED ABSENCE OF OTHER ORGANS: Chronic | ICD-10-CM

## 2024-04-02 DIAGNOSIS — Z94.5 SKIN TRANSPLANT STATUS: Chronic | ICD-10-CM

## 2024-04-02 DIAGNOSIS — D64.9 ANEMIA, UNSPECIFIED: ICD-10-CM

## 2024-04-02 PROCEDURE — G2211 COMPLEX E/M VISIT ADD ON: CPT

## 2024-04-02 PROCEDURE — 99214 OFFICE O/P EST MOD 30 MIN: CPT

## 2024-04-02 NOTE — REVIEW OF SYSTEMS
[Fever] : no fever [Chest Pain] : no chest pain [Chills] : no chills [Palpitations] : no palpitations [Abdominal Pain] : no abdominal pain [Dizziness] : no dizziness [Constipation] : no constipation [Fainting] : no fainting

## 2024-04-02 NOTE — HISTORY OF PRESENT ILLNESS
[FreeTextEntry1] : 69 y/o F Pmhx HTN, HLD, CAD , severe MR s/p recent Tricuspid valve replacement, seizures s/p temporal lobectomy, breast CA s/p surgery and chemo, migraines who presents today for follow up for chronic pain.  Since her last visit reports no new medical issues.  Overall no significant change in headaches or pain levels.  She has increased frequency of headaches with cooler, damp or rainy weather. Severe HA earlier today somewhat improved with current meds.  She is now having headaches,3 x/week described as a sharp pain on the right side of head 7-9/10 that can last hours to days associated with phonophobia.   Triggers include weather, changes to barometric pressure.    Chronic pain in her left foot unchanged since last visit that comes and goes 6/10 on average exacerbated by changes in weather.    Prior meds include: Corgard, Venlafaxine, Botox AE, MM, Treximet, MS ,  Vyepti , Nurtec no help.  MM ( not effective)  Current meds: hydrocodone 5/325mg 4x/day; Nurtec, clobazam 40 mg 2x/day, carbamazepine 200 mg TID, rosuvastatin, MVI   She is  and lives in [place], children in [place].   brown belt, recent hernia repair. Remodeling kitchen.

## 2024-04-02 NOTE — END OF VISIT
Patient called to ask for the RNCC, Meg, phone number as he had lost it.  Phone number provided.    [Time Spent: ___ minutes] : I have spent [unfilled] minutes of time on the encounter.

## 2024-04-02 NOTE — PHYSICAL EXAM
[Oriented To Time, Place, And Person] : oriented to person, place, and time [Affect] : the affect was normal [Mood] : the mood was normal [Person] : oriented to person [Time] : oriented to time [Place] : oriented to place [Cranial Nerves Facial (VII)] : face symmetrical [Cranial Nerves Vestibulocochlear (VIII)] : hearing was intact bilaterally [Cranial Nerves Accessory (XI - Cranial And Spinal)] : head turning and shoulder shrug symmetric [Cranial Nerves Hypoglossal (XII)] : there was no tongue deviation with protrusion [Motor Strength] : muscle strength was normal in all four extremities [Motor Handedness Right-Handed] : the patient is right hand dominant [Abnormal Walk] : normal gait [Sclera] : the sclera and conjunctiva were normal [PERRL With Normal Accommodation] : pupils were equal in size, round, reactive to light, with normal accommodation [Extraocular Movements] : extraocular movements were intact [] : no respiratory distress [FreeTextEntry1] : no signs of toxicity [Paresis Pronator Drift Right-Sided] : no pronator drift on the right [Paresis Pronator Drift Left-Sided] : no pronator drift on the left [Motor Strength Upper Extremities Bilaterally] : strength was normal in both upper extremities [Motor Strength Lower Extremities Bilaterally] : strength was normal in both lower extremities

## 2024-04-02 NOTE — ASSESSMENT
[Opioids] : Patient was explained in detail about pain control by using opioids. Patient has signed and fully understands our guidelines for medication and drug screening.  Patient understands the side effects of opioids, including, but not limited to, drug tolerance, dependence, potential for addiction. This class of drugs is habit-forming and ELIJAH regulated. The sedative effects of opioids can be potentiated by taking alcohol or any sleeping pills, along with opioids. The decision to drive is patient's responsibility, as opioids can affect his/her driving ability and ability to concentrate. The long-term place is not clear, however, patient understands that once the pain control optimizes, the goal will be to wean off the opioids. All the issues regarding opioid treatment have been addressed satisfactorily.  [FreeTextEntry1] : 67 y/o F with chronic pain and Migraines  Discussed in detail the nature of chronic pain as well as chronic opioid use for non-malignant pain. Long term use of opioids is not recommended and can potentially lead to hyperalgesia, tolerance and addiction. Additionally, we extensively discussed the risks possible AE when taking opioids alone or in conjunction with benzodiazepines, hypnotics and other sedating medications including respiratory suppression and death. We have discussed the importance of exploring nonopioid pain management including PT, therapeutic massage, stretching, exercise program, acupuncture, CBT as well as topical medications, non-opioid pain medications.  continue hydrocodone 5/325 mg 4x/day. Consider further gradual taper.  Nurtec through studdex assist. UDS performed April 4th 2024 I-Stop reviewed, reference #: [id number] No signs of aberrant behavior and will continue to monitor for signs of toxicity. Reminded to continue to avoid alcohol. Patient denies other prescribers. Discussed OD prevention education and prescribed naloxone kit Safe storage of medication was reviewed. Opiate agreement was renewed Jan 26, 2023   The patient had the opportunity to ask questions and all were answered to their satisfaction. The patient verbalized understanding of the management plan and agreed with our recommendations.

## 2024-04-09 LAB
AMPHET UR-MCNC: NEGATIVE NG/ML
BARBITURATES UR-MCNC: NEGATIVE NG/ML
BENZODIAZ UR-MCNC: NORMAL NG/ML
COCAINE METAB.OTHER UR-MCNC: NEGATIVE NG/ML
CREATININE, URINE: 79.9 MG/DL
FENTANYL, URINE: NEGATIVE NG/ML
METHADONE UR-MCNC: NEGATIVE NG/ML
OPIATES UR-MCNC: NORMAL NG/ML
OXYCODONE/OXYMORPHONE, URINE: NEGATIVE NG/ML
PCP UR-MCNC: NEGATIVE NG/ML
PH, URINE: 5.5
PLEASE NOTE: DRUGSCRUR: NORMAL
THC UR QL: NEGATIVE NG/ML

## 2024-04-11 ENCOUNTER — APPOINTMENT (OUTPATIENT)
Dept: HEMATOLOGY ONCOLOGY | Facility: CLINIC | Age: 69
End: 2024-04-11
Payer: MEDICARE

## 2024-04-11 DIAGNOSIS — C50.919 MALIGNANT NEOPLASM OF UNSPECIFIED SITE OF UNSPECIFIED FEMALE BREAST: ICD-10-CM

## 2024-04-11 PROCEDURE — 99214 OFFICE O/P EST MOD 30 MIN: CPT

## 2024-04-11 NOTE — HISTORY OF PRESENT ILLNESS
[de-identified] : 68 F with past medical history of seizures, cardiomyopathy, HLD, CAD, MR, chronic lower back pain, osteoporosis, with history of recurrent left breast cancer, returning for medical oncology follow-up.  CASE SYNOPSIS: 1998-stage I left breast cancer; received adjuvant CMF and chest wall radiation.  [phone] 5 years of tamoxifen.  10/2004 -11/2009: Letrozole for left breast cancer recurrence (under the care of Dr. Murray).  6/21/2021-US breast bilateral/screening mammogram: Dense breasts; no mammographic or sonographic evidence of malignancy.  6/22/2022: Bilateral breast US/mammogram-no evidence of malignancy.   8/22/2023- Bilateral breast US/mammogram-no evidence of malignancy.  [FreeTextEntry1] : Off  HRT since 2009 [de-identified] : Last seen in the office in February 2023.Continues follow-up with pain management due to persistent headaches/migraines. Reports no seizures in the past 6-month.  Physical examination unchanged  .Last mammogram/breast US from August 2023 was negative for recurrent disease. Last DEXA/bone density done in October 2017. Hematologic profile from February 2024 showed stable CBC/vitamin D.  Ambulates with a cane, denies falls.  No significant weight gain and patient continues to smoke.

## 2024-04-11 NOTE — REVIEW OF SYSTEMS
[Joint Pain] : joint pain [Joint Stiffness] : joint stiffness [Negative] : Integumentary [de-identified] : continues to present seizures

## 2024-04-11 NOTE — PHYSICAL EXAM
[Ambulatory and capable of all self care but unable to carry out any work activities] : Status 2- Ambulatory and capable of all self care but unable to carry out any work activities. Up and about more than 50% of waking hours [Thin] : thin [Normal] : RRR, normal S1S2, no murmurs, rubs, gallops [de-identified] : s/p left breast lumpectomy, small circumareolar scar well healed

## 2024-04-11 NOTE — ASSESSMENT
[FreeTextEntry1] : Ms. GONZALEZ 's questions were answered to her satisfaction.  She  expressed her  understanding and willingness to comply with the above recommendations, and  will return to the office in 1 year.

## 2024-05-15 NOTE — ED ADULT NURSE NOTE - NSFALLRSKPSTHSTOCCUR_ED_ALL_ED
Get chest x-ray/lab at [facility].  Increase intake of clear liquids and rest.  Take Tylenol and/or Ibuprofen as needed for fever.  Continue plain Mucinex to thin secretions.  Follow up pending x-ray/lab results.  Follow up if symptoms persist or worsen.     Physiological Fall

## 2024-06-07 ENCOUNTER — APPOINTMENT (OUTPATIENT)
Dept: NEUROLOGY | Facility: CLINIC | Age: 69
End: 2024-06-07
Payer: MEDICARE

## 2024-06-07 PROCEDURE — G2211 COMPLEX E/M VISIT ADD ON: CPT

## 2024-06-07 PROCEDURE — 99214 OFFICE O/P EST MOD 30 MIN: CPT

## 2024-06-07 RX ORDER — HYDROCODONE BITARTRATE AND ACETAMINOPHEN 5; 325 MG/1; MG/1
5-325 TABLET ORAL 4 TIMES DAILY
Qty: 120 | Refills: 0 | Status: ACTIVE | COMMUNITY
Start: 2018-08-01 | End: 1900-01-01

## 2024-06-07 RX ORDER — CLOBAZAM 20 MG/1
20 TABLET ORAL TWICE DAILY
Qty: 120 | Refills: 5 | Status: ACTIVE | COMMUNITY
Start: 2019-05-21 | End: 1900-01-01

## 2024-06-07 NOTE — DISCUSSION/SUMMARY
[FreeTextEntry1] : 68 year old woman with complex partial refractory epilepsy on Klonopin and CBZ s/p surgery over 20 years ago with chronic headache as well.  -The VEEG showed left sided seizures from the Fronto-temporal region.  We discussed the possibility of epilepsy sx in detail.  She had neuropsych testing which showed diffuse cerebral dysfunction, right greater than left.  Plan: - Taking Clobazam 40mg bid and doing better - Discussed risks of CBZ in osteoporosis, but risk of stopping medication high in increasing her seizures.  She knows risk of fracture at this time and is being treated by her PMD. Did poorly on Carbatrol and back on CBZ. - encouraged 3 servings Ca+ in diet and low weight bearing exercises - reviewed seizure triggers - f/u CBZ  (CBC,CMP). Done PMD and stable in past and seeing PMD in next week to have it checked. -follow up in 2-3 months. -Also discussed Xcopri today as option.  Total time 32 min  . [Medically Refractory (seizure within the last year)] : Medically Refractory (seizure within the last year) [Complex Partial] : complex partial [Symptomatic] : symptomatic [Risks Associated with Driving/[place] Law] : As per my usual protocol, the patient was advised in regards to risks and driving privileges associated with the [place] Guidelines.  [Safety Recommendations] : The patient was advised in regards to the risk of seizures and general seizure safety recommendations including not to be bathing alone, climbing to high places and operating heavy machinery. [Compliance with Medications] : The importance of compliance with medications was reinforced. [Bone Health Education] : Patient was educated in regards to bone health and an increased risk of osteoporosis in patients with epilepsy. [Sleep Hygiene/Sleep Disruption Risks] : Sleep hygiene and the risks of sleep disruption were discussed. [Surgical Options/Risks/Benefits] : Surgical options/risks/benefits for intractable epilepsy were discussed. [Risk of Death] : Risk of death associated with seizures / SUDEP was discussed. [Opioids] : Patient was explained in detail about pain control by using opioids. Patient has signed and fully understands our guidelines for medication and drug screening.  Patient understands the side effects of opioids, including, but not limited to, drug tolerance, dependence, potential for addiction. This class of drugs is habit-forming and ELIJAH regulated. The sedative effects of opioids can be potentiated by taking alcohol or any sleeping pills, along with opioids. The decision to drive is patient's responsibility, as opioids can affect his/her driving ability and ability to concentrate. The long-term place is not clear, however, patient understands that once the pain control optimizes, the goal will be to wean off the opioids. All the issues regarding opioid treatment have been addressed satisfactorily.

## 2024-06-13 ENCOUNTER — APPOINTMENT (OUTPATIENT)
Dept: INTERNAL MEDICINE | Facility: CLINIC | Age: 69
End: 2024-06-13
Payer: MEDICARE

## 2024-06-13 VITALS
HEART RATE: 72 BPM | WEIGHT: 102 LBS | TEMPERATURE: 97.8 F | OXYGEN SATURATION: 98 % | SYSTOLIC BLOOD PRESSURE: 153 MMHG | DIASTOLIC BLOOD PRESSURE: 81 MMHG | BODY MASS INDEX: 17.42 KG/M2 | HEIGHT: 64 IN

## 2024-06-13 DIAGNOSIS — E78.2 MIXED HYPERLIPIDEMIA: ICD-10-CM

## 2024-06-13 DIAGNOSIS — M81.0 AGE-RELATED OSTEOPOROSIS W/OUT CURRENT PATHOLOGICAL FRACTURE: ICD-10-CM

## 2024-06-13 DIAGNOSIS — G40.909 EPILEPSY, UNSPECIFIED, NOT INTRACTABLE, W/OUT STATUS EPILEPTICUS: ICD-10-CM

## 2024-06-13 PROCEDURE — 99214 OFFICE O/P EST MOD 30 MIN: CPT

## 2024-06-13 PROCEDURE — 36415 COLL VENOUS BLD VENIPUNCTURE: CPT

## 2024-06-13 NOTE — ASSESSMENT
[FreeTextEntry1] : 1) HL - ct statin - low fat diet  2) Sz d/o - ct neuro f/u - check Carbamazepine level  3) CT lung- ct to smoke  4) OP -  BMD Ordered   low salt diet  check BP in 3 month

## 2024-06-13 NOTE — PHYSICAL EXAM
[No Acute Distress] : no acute distress [Normal Sclera/Conjunctiva] : normal sclera/conjunctiva [Normal] : soft, non-tender, non-distended, no masses palpated, no HSM and normal bowel sounds

## 2024-06-13 NOTE — HISTORY OF PRESENT ILLNESS
[FreeTextEntry1] : f.u   HL / sz disorder / breast cancer ( s/p L lumpectomy , chemo , RT ) OP  ct to have sz 2-4 x /week  no precipitating factiors

## 2024-06-14 LAB — CARBAMAZEPINE SERPL-MCNC: 9.9 UG/ML

## 2024-06-21 RX ORDER — CARBAMAZEPINE 200 MG/1
200 TABLET ORAL
Qty: 270 | Refills: 3 | Status: ACTIVE | COMMUNITY
Start: 2022-05-02 | End: 1900-01-01

## 2024-06-26 ENCOUNTER — NON-APPOINTMENT (OUTPATIENT)
Age: 69
End: 2024-06-26

## 2024-07-03 ENCOUNTER — APPOINTMENT (OUTPATIENT)
Dept: PAIN MANAGEMENT | Facility: CLINIC | Age: 69
End: 2024-07-03
Payer: MEDICARE

## 2024-07-03 VITALS
HEIGHT: 64 IN | SYSTOLIC BLOOD PRESSURE: 136 MMHG | DIASTOLIC BLOOD PRESSURE: 68 MMHG | BODY MASS INDEX: 17.42 KG/M2 | WEIGHT: 102 LBS | HEART RATE: 77 BPM

## 2024-07-03 PROCEDURE — 99213 OFFICE O/P EST LOW 20 MIN: CPT

## 2024-07-03 PROCEDURE — G2211 COMPLEX E/M VISIT ADD ON: CPT

## 2024-07-05 ENCOUNTER — OUTPATIENT (OUTPATIENT)
Dept: OUTPATIENT SERVICES | Facility: HOSPITAL | Age: 69
LOS: 1 days | End: 2024-07-05
Payer: MEDICARE

## 2024-07-05 ENCOUNTER — APPOINTMENT (OUTPATIENT)
Dept: RADIOLOGY | Facility: IMAGING CENTER | Age: 69
End: 2024-07-05
Payer: MEDICARE

## 2024-07-05 DIAGNOSIS — Z94.5 SKIN TRANSPLANT STATUS: Chronic | ICD-10-CM

## 2024-07-05 DIAGNOSIS — Z90.89 ACQUIRED ABSENCE OF OTHER ORGANS: Chronic | ICD-10-CM

## 2024-07-05 DIAGNOSIS — M81.0 AGE-RELATED OSTEOPOROSIS WITHOUT CURRENT PATHOLOGICAL FRACTURE: ICD-10-CM

## 2024-07-05 PROCEDURE — 77080 DXA BONE DENSITY AXIAL: CPT

## 2024-07-05 PROCEDURE — 77080 DXA BONE DENSITY AXIAL: CPT | Mod: 26

## 2024-07-09 DIAGNOSIS — M81.0 AGE-RELATED OSTEOPOROSIS W/OUT CURRENT PATHOLOGICAL FRACTURE: ICD-10-CM

## 2024-07-23 ENCOUNTER — LABORATORY RESULT (OUTPATIENT)
Age: 69
End: 2024-07-23

## 2024-07-23 ENCOUNTER — APPOINTMENT (OUTPATIENT)
Dept: ENDOCRINOLOGY | Facility: CLINIC | Age: 69
End: 2024-07-23
Payer: MEDICARE

## 2024-07-23 VITALS
DIASTOLIC BLOOD PRESSURE: 74 MMHG | BODY MASS INDEX: 17.51 KG/M2 | SYSTOLIC BLOOD PRESSURE: 138 MMHG | OXYGEN SATURATION: 98 % | WEIGHT: 102 LBS | HEART RATE: 68 BPM

## 2024-07-23 DIAGNOSIS — R74.8 ABNORMAL LEVELS OF OTHER SERUM ENZYMES: ICD-10-CM

## 2024-07-23 DIAGNOSIS — M81.0 AGE-RELATED OSTEOPOROSIS W/OUT CURRENT PATHOLOGICAL FRACTURE: ICD-10-CM

## 2024-07-23 DIAGNOSIS — M40.209 UNSPECIFIED KYPHOSIS, SITE UNSPECIFIED: ICD-10-CM

## 2024-07-23 DIAGNOSIS — E55.9 VITAMIN D DEFICIENCY, UNSPECIFIED: ICD-10-CM

## 2024-07-23 LAB
25(OH)D3 SERPL-MCNC: 24.5 NG/ML
ALBUMIN SERPL ELPH-MCNC: 4.2 G/DL
ALP BLD-CCNC: 129 U/L
ALT SERPL-CCNC: 9 U/L
ANION GAP SERPL CALC-SCNC: 16 MMOL/L
AST SERPL-CCNC: 13 U/L
BILIRUB SERPL-MCNC: 0.4 MG/DL
BUN SERPL-MCNC: 9 MG/DL
CALCIUM SERPL-MCNC: 9.2 MG/DL
CALCIUM SERPL-MCNC: 9.2 MG/DL
CHLORIDE SERPL-SCNC: 106 MMOL/L
CO2 SERPL-SCNC: 23 MMOL/L
CREAT SERPL-MCNC: 0.57 MG/DL
EGFR: 100 ML/MIN/1.73M2
GLUCOSE SERPL-MCNC: 102 MG/DL
MAGNESIUM SERPL-MCNC: 2 MG/DL
PARATHYROID HORMONE INTACT: 68 PG/ML
PHOSPHATE SERPL-MCNC: 3.4 MG/DL
POTASSIUM SERPL-SCNC: 3.9 MMOL/L
PROT SERPL-MCNC: 7.5 G/DL
SODIUM SERPL-SCNC: 144 MMOL/L

## 2024-07-23 PROCEDURE — 99205 OFFICE O/P NEW HI 60 MIN: CPT

## 2024-07-23 PROCEDURE — G2211 COMPLEX E/M VISIT ADD ON: CPT

## 2024-07-23 NOTE — PAST MEDICAL HISTORY
[History of Hormone Replacement Treatment] : has no history of hormone replacement treatment [de-identified] : amenorrhea at age 42

## 2024-07-23 NOTE — PROCEDURE
[FreeTextEntry1] : Bone Mineral Density: 07/5/2024  Indication: vs 2017 Spine: -2.7, osteoporosis, prior -2.5 Total hip: -3.2, osteoporosis, prior -3.0 Femoral neck: -3.2, osteoporosis, prior -2.8 Proximal radius: not measured in 2024, prior -3.1

## 2024-07-23 NOTE — REVIEW OF SYSTEMS
[Diarrhea] : diarrhea [As Noted in HPI] : as noted in HPI [Headaches] : headaches [Negative] : Heme/Lymph

## 2024-07-23 NOTE — HISTORY OF PRESENT ILLNESS
[FreeTextEntry1] : CHIEF COMPLAINT: Osteoporosis  HISTORY OF PRESENT ILLNESS: Patient has been told of osteoporosis since at least . I do not have all the bone density data / images available from that time. BMD drifted in  and pt states she started Fosamax at that time. She took it correctly for a few months but then developed diarrhea and discontinued. She was seen by GI and told of stomach ulcers and polyps. She also has a h/o epilepsy since she was a child. She has been on different medications including Dilantin and phenobarbital during pregnancy, then Tegretol and Onfi. Her last seizure was a few weeks ago. Pt states she is compliant with medication. States her Neurologist advised her to have a repeat DXA scan with Dr. Blanc (PCP). Repeat DXA  shows worsening osteoporosis. Pt was referred to discuss treatment options.   Menarche age 10. Regular menses. No difficulty getting pregnant. She had her third child via  at age 42 (). Denies BSO. States she was told of breast cancer after giving birth, unsure if on routine mammogram or physical exam. She had a L lumpectomy, chemo and RT. Then developed amenorrhea, age 42. States she was on tamoxifen ( - ) but then had reoccurrence of breast cancer. Pt underwent another lumpectomy, reports no LN involvement and was on Letrozole ( - ) to prevent breast cancer. UTD mammogram, due Aug 2024.    Active lifestyle.  Exercises every morning with , wilda kortney Fractures: wrist fx at home 10 years ago, unsure of exact circumstances; foot fracture s/p fall due to seizure >5 years ago and has been using a cane since. Frequent falls: denies any falls in the past 6 months or issues with balance. Fhx: mom had osteoporosis, denies h/o parental hip fracture.  Denies h/o disordered eating. Weighs 98 - 105 lbs as an adult (18+). Calcium: Mostly dairy free, also cannot tolerate gluten although she is unsure if she has Celiac disease. Has green, leafy vegetables. H/o Vit D deficiency - was taking Vit D3 supplement, unsure of dose. Pt told to stop all supplements due to diarrhea. Diarrhea resolved after she stopped. Denies history of kidney stones, excessive alcohol intake, excessive soda intake, malabsorption, nutritional deficiencies, GIB.    Smoking hx: active smoker, started age 21s, stopped with pregnancy, quit , restarted , smokes up to 5 cigarettes/day. Has medical marijuana card but not using as it did not help her migraines.    PMHx/PSHx:  Migraine headaches Abnormal heart rhythm, cardiomyopathy, ?h/o MI, MVR s/p clipping, HLD on Crestor, follows with cards Denies Paget's Dz, hyperparathyroidism, diabetes, blood clots, and chronic steroid use.   UTD DDS. Full upper and lower dentures.

## 2024-07-23 NOTE — REASON FOR VISIT
[Consultation] : a consultation visit [Osteoporosis] : osteoporosis [FreeTextEntry2] : Dr. Blanc (PCP)

## 2024-07-23 NOTE — ASSESSMENT
[FreeTextEntry1] : Initial consultation for osteoporosis, 07/23/2024.  Pt has a h/o breast cancer s/p lumpectomy, chemo, RT and h/o amenorrhea at age 42 (1998). States she was on tamoxifen x5 years but then had reoccurrence of breast cancer (2004). S/p another lumpectomy, reports no LN involvement. She was on Letrozole x5 years (completed 2009). UTD mammogram.  H/o wrist fx 10 years ago (2014). Told of osteoporosis since at least 2015 then BMD drifted in 2017. Pt states she tried Fosamax, took it correctly x months but developed diarrhea and discontinued. She was seen by GI and told of stomach ulcers and polyps. H/o foot fracture s/p fall due to seizure 2020. Pt has been using a cane since. Denies any falls in the past 6 months or issues with balance. Fh/o mom with osteoporosis.  She also has a h/o epilepsy. She has been on different medications including Dilantin, phenobarbital, currently on Tegretol and Onfi. Her last seizure was a few weeks ago. Seen by Neurology, note appreciated.  Pt has weighed 98 - 105 lbs as an adult (18+). Does not eat dairy or supplement with Ca/Vit D due to diarrhea. States she cannot tolerate gluten although she is unsure if she has Celiac disease.   Pt is also actively smoking up to 5 cigarettes daily. Has medical marijuana card for migraine headaches but not using as it did not.   BMD 07/5/2024 vs 2017 shows osteoporosis in the spine -2.7, prior -2.5, osteoporosis in the total hip -3.2, prior -3.0, osteoporosis in the femoral neck -3.2, prior -2.8, no proximal radius measurement in 2024, prior -3.1. BMD results were discussed with the patient.  Given the patient's history and BMD, the patient is at an increased risk of future fracture. Options of medical therapy were discussed in detail including risks and benefits.  I discussed Rx with bisphosphonate therapy, IV Reclast. I do not recommend PO due to gi hx including untreated stomach ulcers. Risks and benefits of bisphosphonate therapy were discussed including osteonecrosis of the jaw (ONJ), atypical femur fractures, and acute phase reaction.  I discussed Rx with twice a year Prolia injection. Risks and benefits of Prolia discussed including ONJ and atypical femur fracture. I discussed that the patient cannot stop Prolia without expecting rapid bone loss and an increase in risk for future fracture unless they transition to another Rx. Furthermore, there is 10 year safety data for Prolia.  I would avoid anabolic therapy due to h/o breast cancer s/p RT then reoccurrence, especially as bone is a common site for breast cancer metastasis. Also, pt has extensive cardiac history.   All questions were answered. Rx information handout provided. The patient understands and elects to do more research.  I discussed that weight bearing exercises, cardiovascular activities, and diet are beneficial to overall health, but are not adequate for bone density increase or alternative to osteoporosis medication.  Draw labs today including CMP, PTH, Phos, Mag, 25 Vit D, Comprehensive celiac cascade, and BSAP due to elevated alk phos.  I will monitor response to therapy with a baseline biochemical marker of bone turnover, CTx. The patient is aware this may not be covered by insurance.   Follow up TBD.  Komal MS, Amari SL, Indigo KL, Wade EM, Jose Antonio KG,  AJ, Beni ES. The clinician's guide to prevention and treatment of osteoporosis. Osteoporosis Int. 2022 Oct;33(10):8061-8665.

## 2024-07-23 NOTE — PHYSICAL EXAM
[Alert] : alert [No Acute Distress] : no acute distress [Normal Sclera/Conjunctiva] : normal sclera/conjunctiva [EOMI] : extra ocular movement intact [No Proptosis] : no proptosis [Thyroid Not Enlarged] : the thyroid was not enlarged [No Respiratory Distress] : no respiratory distress [No Accessory Muscle Use] : no accessory muscle use [Clear to Auscultation] : lungs were clear to auscultation bilaterally [Normal Rate] : heart rate was normal [No Edema] : no peripheral edema [Not Tender] : non-tender [Not Distended] : not distended [Soft] : abdomen soft [No Spinal Tenderness] : no spinal tenderness [Kyphosis] : kyphosis present [No Stigmata of Cushings Syndrome] : no stigmata of Cushings Syndrome [Normal Gait] : normal gait [Normal Strength/Tone] : muscle strength and tone were normal [No Tremors] : no tremors [Oriented x3] : oriented to person, place, and time [Acanthosis Nigricans] : no acanthosis nigricans [de-identified] : full upper and lower dentures, no nichole visualized [de-identified] : 2 fingerbreadths ribs to pelvis L>R [de-identified] : ambulates with cane, demonstrated safe use of AD

## 2024-07-26 LAB
ALP BONE SERPL-MCNC: 34.6 UG/L
CELIAC DISEASE INTERPRETATION: NORMAL
CELIAC GENE PAIRS PRESENT: NO
DQ ALPHA 1: NORMAL
DQ BETA 1: NORMAL
IMMUNOGLOBULIN A (IGA): 776 MG/DL

## 2024-07-28 LAB — COLLAGEN CTX SERPL-MCNC: 711 PG/ML

## 2024-08-27 ENCOUNTER — OUTPATIENT (OUTPATIENT)
Dept: OUTPATIENT SERVICES | Facility: HOSPITAL | Age: 69
LOS: 1 days | End: 2024-08-27
Payer: MEDICARE

## 2024-08-27 ENCOUNTER — RESULT REVIEW (OUTPATIENT)
Age: 69
End: 2024-08-27

## 2024-08-27 ENCOUNTER — NON-APPOINTMENT (OUTPATIENT)
Age: 69
End: 2024-08-27

## 2024-08-27 ENCOUNTER — APPOINTMENT (OUTPATIENT)
Dept: ULTRASOUND IMAGING | Facility: IMAGING CENTER | Age: 69
End: 2024-08-27
Payer: MEDICARE

## 2024-08-27 ENCOUNTER — APPOINTMENT (OUTPATIENT)
Dept: MAMMOGRAPHY | Facility: IMAGING CENTER | Age: 69
End: 2024-08-27
Payer: MEDICARE

## 2024-08-27 DIAGNOSIS — Z94.5 SKIN TRANSPLANT STATUS: Chronic | ICD-10-CM

## 2024-08-27 DIAGNOSIS — Z90.89 ACQUIRED ABSENCE OF OTHER ORGANS: Chronic | ICD-10-CM

## 2024-08-27 DIAGNOSIS — C50.919 MALIGNANT NEOPLASM OF UNSPECIFIED SITE OF UNSPECIFIED FEMALE BREAST: ICD-10-CM

## 2024-08-27 PROCEDURE — 76641 ULTRASOUND BREAST COMPLETE: CPT

## 2024-08-27 PROCEDURE — 77063 BREAST TOMOSYNTHESIS BI: CPT | Mod: 26

## 2024-08-27 PROCEDURE — 77067 SCR MAMMO BI INCL CAD: CPT | Mod: 26

## 2024-08-27 PROCEDURE — 77063 BREAST TOMOSYNTHESIS BI: CPT

## 2024-08-27 PROCEDURE — 76641 ULTRASOUND BREAST COMPLETE: CPT | Mod: 26,50

## 2024-08-27 PROCEDURE — 77067 SCR MAMMO BI INCL CAD: CPT

## 2024-08-28 NOTE — REVIEW OF SYSTEMS
8/28/2024      Preston Cai  [address]  [address]  [facility] [place] [address]      Dear Colleague,    Thank you for referring your patient, Preston Cai, to the [facility]. Please see a copy of my visit note below.    Preston Cai's goals for this visit include:   Chief Complaint   Patient presents with     Skin Check     Hx NMSC. He has an itchy back and a spot by his nose.       He requests these members of his care team be copied on today's visit information: no    PCP: Tyrel Manning    Referring Provider:  Referred Self, MD  No address on file    There were no vitals taken for this visit.    Do you need any medication refills at today's visit? No  Jasmine Alcala LPN        [facility] Dermatology Note  Encounter Date: Aug 28, 2024  Office Visit      Dermatology Problem List:  FBSE: 8/28/24    #NUB x 3, S/p Biopsy performed on 8/28/24  1. 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 AKs, s/p cryo  - x6 face and scalp cryo 02/21/2024  - pigmented AK - left jaw, s/p by 11/13/18  - HAK - right hand, s/p excision 5/2009  3. 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   ____________________________________________    Assessment & Plan:  # Neoplasm of unspecified behavior of the skin (D49.2) on the R medial canthus. The differential diagnosis includes NMSC vs other .   - Shave biopsy performed today, see procedure note below.   - Photographed today     # AKs x 8  # ISKs x 9  - cryotherapy performed, see procedure note below     # Neoplasm of unspecified behavior of the skin (D49.2) on the L helix  The differential diagnosis includes NMSC vs other .   - Shave biopsy performed today, see procedure note below.   - Photographed today     # Neoplasm of  unspecified behavior of the skin (D49.2) on the R frontal hairline . The differential diagnosis includes SK vs MM vs other.   - Shave biopsy performed today, see procedure note below.   - Photographed today     # Transient acantholytic dermatosis - [place] - controlled with triam - uses only on chest and shoulders  - continue triam for pruritus flares    # Hx of NMSC  - Sunscreen: Apply 20 minutes prior to going outdoors and reapply every two hours, when wet or sweating. We recommend using an SPF 30 or higher, and to use one that is water resistant.     - Advised to monitor for changing, non-healing, bleeding, painful, changing, or otherwise symptomatic lesions  - Continue annual skin exams    # Benign findings: multiple benign nevi, lentigines, cherry angiomas, SKs  - edu on benign etiology  - Signs and Symptoms of non-melanoma skin cancer and ABCDEs of melanoma reviewed with patient. Patient encouraged to perform monthly self skin exams and educated on how to perform them. UV precautions reviewed with patient. Patient was asked about new or changing moles/lesions on body.   - Sunscreen: Apply 20 minutes prior to going outdoors and reapply every two hours, when wet or sweating. We recommend using an SPF 30 or higher, and to use one that is water resistant.     - RTC for changes     Procedures Performed:   - Shave biopsy procedure note x 3 . After discussion of benefits and risks including but not limited to bleeding, infection, scar, incomplete removal, recurrence, and non-diagnostic biopsy, written consent and photographs were obtained. The area was cleaned with isopropyl alcohol. 0.5mL of 1% lidocaine with epinephrine was injected to obtain adequate anesthesia of lesion(s). Shave biopsy at site(s) performed. Hemostasis was achieved with aluminium chloride. Petrolatum ointment and a sterile dressing were applied. The patient tolerated the procedure and no complications were noted. The patient was provided with  verbal and written post care instructions.      CRYOTHERAPY PROCEDURE NOTE: 17 lesions in the above locations were treated with liquid nitrogen utilizing a 5-10sec thaw time. Patient was advised that the treated areas will become red, swollen, may develop a blister and then should crust and peal off in the next 1-2 weeks. Post-procedure instructions were provided.    Follow-up: pending path results    Staff and scribe:    Scribe Disclosure:   I, DORIS EDWARDS, am serving as a scribe; to document services personally performed by Jessika Armenta PA-C -based on data collection and the provider's statements to me.     Provider Disclosure:  I agree with above History, Review of Systems, Physical exam and Plan.  I have reviewed the content of the documentation and have edited it as needed. I have personally performed the services documented here and the documentation accurately represents those services and the decisions I have made.      Electronically signed by:    All risks, benefits and alternatives were discussed with patient.  Patient is in agreement and understands the assessment and plan.  All questions were answered.    Jessika Armenta PA-C, MPAS  [facility]: Phone: [phone], Fax: [phone]  [facility]: Phone: [phone],  Fax: [phone]  [facility]: Phone: [phone], Fax: [phone]  ____________________________________________    CC: Skin Check (Hx NMSC. He has an itchy back and a spot by his nose.)      Reviewed patients past medical history and pertinent chart review prior to patient's visit today.     HPI:  Mr. Preston Cai is a 88 year old male who presents today as a return patient for [facility]SC.     Today patient reported a spot of concern on his back and on his nose. Notes back has started to itch. He stopped using triam on the back because it was difficult to apply. Still uses  on chest and shoulders.     Has a hx of NMSC    Patient is otherwise feeling well, without additional concerns.    Labs:  N/A    Physical Exam:  Vitals: There were no vitals taken for this visit.  SKIN: Total skin excluding the undergarment areas was performed. The exam included the head/face, neck, both arms, chest, back, abdomen, both legs, digits and/or nails.    - - Glez's skin type II, has <100 nevi  - There are dome shaped bright red papules on the trunk.   - Multiple regular brown pigmented macules and papules are identified on the trunk and extremities.   - Scattered brown macules on sun exposed areas.  - There are waxy stuck on tan to brown papules on the trunk.     -There are well healed surgical scars without erythema, nodularity or telangiectasias on the prior NMSC site, NERD  - There is a tan to brown waxy stuck on papule with surrounding erythema on the:  x 9 on back  - There is an erythematous macule with overyling adherent scale on the: vertex scalp x 5, x 2 forehead, x 1 r temple   - R frontal hairline there is a 1 cm brown asymmetrical macule   - 7 mm pink shiny papule   - R medial canthus there is a 1 cm pink pearly lesion with central erosion in two areas   - No other lesions of concern on areas examined.                 Medications:  Current Outpatient Medications   Medication Sig Dispense Refill     acetaminophen 500 MG CAPS 2 x 500mg at 630/7p       brimonidine-timolol (COMBIGAN) 0.2-0.5 % ophthalmic solution Place 1 drop into the right eye 2 times daily 10 mL 3     dorzolamide (TRUSOPT) 2 % ophthalmic solution Place 1 drop into the right eye 3 times daily 10 mL 11     famotidine (PEPCID) 20 MG tablet Take 1 tablet (20 mg) by mouth 2 times daily 120 tablet 1     gabapentin (NEURONTIN) 100 MG capsule Takes 2-3 x 100mg capsule by mouth nightly 270 capsule 3     latanoprost (XALATAN) 0.005 % ophthalmic solution Place 1 drop into the right eye at bedtime 2.5 mL 4     primidone (MYSOLINE)  250 MG tablet 1/4 tab in morning and 1 in the evevning 135 tablet 3     triamcinolone (KENALOG) 0.1 % external cream Apply topically 2 times daily as needed (itchy rash of shoulders/chest) 454 g 3     trospium (SANCTURA XR) 60 MG CP24 24 hr capsule Take 1 capsule (60 mg) by mouth every morning (Patient not taking: Reported on 8/28/2024) 30 capsule 11     Vitamin D, Cholecalciferol, 25 MCG (1000 UT) CAPS Take 1,000 Units by mouth daily (Winter only) (Patient not taking: Reported on 8/28/2024)       Current Facility-Administered Medications   Medication Dose Route Frequency Provider Last Rate Last Admin     methylPREDNISolone (DEPO-MEDROL) injection 40 mg  40 mg   Mau Miller DO   40 mg at 09/12/22 1002     methylPREDNISolone (DEPO-MEDROL) injection 40 mg  40 mg   Mau Miller DO   40 mg at 09/12/22 1002      Past Medical/Surgical History:   Patient Active Problem List   Diagnosis     Seborrheic dermatitis     Seasonal allergic rhinitis due to pollen     Cervical radiculopathy at C6     Personal history of malignant neoplasm of larynx     Essential tremor     CARDIOVASCULAR SCREENING; LDL GOAL LESS THAN 160     Benign prostatic hyperplasia     Hoarse voice quality     Pseudophakia     Hiatal hernia with GERD     Macular degeneration     Supraspinatus sprain, right, initial encounter     CARDIOVASCULAR SCREENING; LDL GOAL LESS THAN 100     Vitamin D deficiency     Chronic midline low back pain without sciatica     Sleep disorder, nonorganic     Other gastritis without hemorrhage, unspecified chronicity     BPH associated with nocturia     Weight loss     Pernicious anemia     Arthritis of right shoulder region     Past Medical History:   Diagnosis Date     Acid reflux disease      Allergic rhinitis, cause unspecified      H/O magnetic resonance imaging of brain and brain stem 10/10/2016    MRI BRAIN WITH AND WITHOUT CONTRAST August 17, 2009 8:07:00 PM   HISTORY: Severe dizzy spells with imbalance and  vomiting.   TECHNIQUE: Routine pulse sequences without and with contrast were performed including thin section images through the IACs. 20 mL Magnevist given.   FINDINGS: Diffusion-weighted images are normal. The brain parenchyma, brainstem, ventricular system, and subarachnoid spaces a     Hematuria     Hematuria  - in 1990's      History of anemia 2002    Anemia-Iron Deficit     History of gastric ulcer      Macular degeneration      Malignant neoplasm of laryngeal cartilages (H)     Laryngeal CA (Radiation 1982)     Nonsenile cataract      PONV (postoperative nausea and vomiting)      Tremor 10/03/2016                        The following medication was given:     MEDICATION:  Lidocaine with epinephrine 1% 1:[id number]  ROUTE: SQ  SITE: see procedure note  DOSE: 2cc  LOT #: [id number]  : [employer]  EXPIRATION DATE: 01/31/2026  NDC#: 21404-206-83  Was there drug waste? none  Multi-dose vial: Yes    Jasmine Alcala LPN  August 28, 2024      Again, thank you for allowing me to participate in the care of your patient.        Sincerely,        Jessika Armenta PA-C   [Joint Pain] : joint pain

## 2024-09-25 ENCOUNTER — APPOINTMENT (OUTPATIENT)
Dept: CARDIOLOGY | Facility: CLINIC | Age: 69
End: 2024-09-25

## 2024-09-25 ENCOUNTER — NON-APPOINTMENT (OUTPATIENT)
Age: 69
End: 2024-09-25

## 2024-09-25 VITALS — SYSTOLIC BLOOD PRESSURE: 148 MMHG | DIASTOLIC BLOOD PRESSURE: 72 MMHG | OXYGEN SATURATION: 99 % | HEART RATE: 80 BPM

## 2024-09-25 VITALS
OXYGEN SATURATION: 97 % | SYSTOLIC BLOOD PRESSURE: 138 MMHG | HEIGHT: 64 IN | RESPIRATION RATE: 16 BRPM | WEIGHT: 102 LBS | BODY MASS INDEX: 17.42 KG/M2 | DIASTOLIC BLOOD PRESSURE: 69 MMHG | HEART RATE: 76 BPM

## 2024-09-25 VITALS — DIASTOLIC BLOOD PRESSURE: 74 MMHG | OXYGEN SATURATION: 97 % | HEART RATE: 82 BPM | SYSTOLIC BLOOD PRESSURE: 138 MMHG

## 2024-09-25 VITALS — SYSTOLIC BLOOD PRESSURE: 135 MMHG | HEART RATE: 70 BPM | DIASTOLIC BLOOD PRESSURE: 71 MMHG | OXYGEN SATURATION: 100 %

## 2024-09-25 DIAGNOSIS — I95.1 ORTHOSTATIC HYPOTENSION: ICD-10-CM

## 2024-09-25 DIAGNOSIS — Z95.4 PRESENCE OF OTHER HEART-VALVE REPLACEMENT: ICD-10-CM

## 2024-09-25 DIAGNOSIS — Z71.6 TOBACCO ABUSE COUNSELING: ICD-10-CM

## 2024-09-25 DIAGNOSIS — Z71.3 DIETARY COUNSELING AND SURVEILLANCE: ICD-10-CM

## 2024-09-25 DIAGNOSIS — I25.10 ATHEROSCLEROTIC HEART DISEASE OF NATIVE CORONARY ARTERY W/OUT ANGINA PECTORIS: ICD-10-CM

## 2024-09-25 DIAGNOSIS — Z95.818 OTHER SPECIFIED POSTPROCEDURAL STATES: ICD-10-CM

## 2024-09-25 DIAGNOSIS — Z98.890 OTHER SPECIFIED POSTPROCEDURAL STATES: ICD-10-CM

## 2024-09-25 DIAGNOSIS — I49.3 VENTRICULAR PREMATURE DEPOLARIZATION: ICD-10-CM

## 2024-09-25 DIAGNOSIS — I10 ESSENTIAL (PRIMARY) HYPERTENSION: ICD-10-CM

## 2024-09-25 DIAGNOSIS — E78.5 HYPERLIPIDEMIA, UNSPECIFIED: ICD-10-CM

## 2024-09-25 PROCEDURE — G2212 PROLONG OUTPT/OFFICE VIS: CPT

## 2024-09-25 PROCEDURE — 93040 RHYTHM ECG WITH REPORT: CPT | Mod: 59

## 2024-09-25 PROCEDURE — 93000 ELECTROCARDIOGRAM COMPLETE: CPT

## 2024-09-25 PROCEDURE — 99215 OFFICE O/P EST HI 40 MIN: CPT | Mod: 25

## 2024-09-25 PROCEDURE — 99407 BEHAV CHNG SMOKING > 10 MIN: CPT

## 2024-09-25 PROCEDURE — 99417 PROLNG OP E/M EACH 15 MIN: CPT

## 2024-09-25 NOTE — COUNSELING
[Yes] : Risk of tobacco use and health benefits of smoking cessation discussed: Yes [Cessation strategies including cessation program discussed] : Cessation strategies including cessation program discussed [Use of nicotine replacement therapies and other medications discussed] : Use of nicotine replacement therapies and other medications discussed [Encouraged to pick a quit date and identify support needed to quit] : Encouraged to pick a quit date and identify support needed to quit [Smoking Cessation Program Referral] : Smoking Cessation Program Referral  [No] : Not willing to quit smoking [FreeTextEntry2] : [facility] Smoking Cessation Program [FreeTextEntry3] : 15

## 2024-09-25 NOTE — REVIEW OF SYSTEMS
[Under Stress] : under stress [Negative] : Heme/Lymph [Fever] : no fever [Headache] : no headache [Weight Gain (___ Lbs)] : no recent weight gain [Chills] : no chills [Feeling Fatigued] : not feeling fatigued [Weight Loss (___ Lbs)] : no recent weight loss [de-identified] : see HPI [de-identified] : see HPI

## 2024-09-25 NOTE — ADDENDUM
[FreeTextEntry1] : I spent 60 minutes face to face time with the patient from 10:00 to 11:00 on 9/25/24. Approx 15 min were devoted to smoking cessation, with literature provided regarding [facility] Smoking Cessation Program. Prior to this visit, I reviewed office records of surgical oncology, neurology and structural heart and valve clinic. Labs 2/29/24, 7/23/24 and 9/10/24 were reviewed. This preparation took 20 minutes. Thus, total visit time was 80 minutes.

## 2024-09-25 NOTE — CARDIOLOGY SUMMARY
[de-identified] : 9/25/24, normal sinus rhythm at 69 bpm. There were ST-segment and T-wave changes in leads II, III, aVF, V3-V6.  Prior ECG 2/23/24 showed normal sinus rhythm at 76 bpm, with non-specific ST-segment and T-wave changes. Rhythm / RR - interval analysis 9/25/24 observed 104 beats over 90 sec with mean HR 70 bpm; mean  ms (640-1108) Max/Min 173%; RR SD 36 and RR CV 4%. There was 1 PVC [de-identified] : 6/18/21, small moderate anteroapical and distal inferior defects that are fixed, consistent with infarct. No segmental wall motion abnormality; LVEF 61%. There was no significant change compared to prior study 3/7/17. [de-identified] : 11/30/23, The LV cavity was normal in size. LV systolic function was normal with EF 54 %. There was hypokinesis of the basal inferior and basal inferolateral walls. All remaining wall segments were normal. LV global longitudinal strain was -11.1 % which was abnormal (normal > -16%). Images were acquired with HR 78 bpm /79 mmHg. RV cavity was normal in size and systolic function. DAGO ACE RAND on A2/P2. The device was well seated and attached. The transmitral peak gradient was 11.6 mmHg and mean gradient 4.00 mmHg with HR 88 bpm. There was mild mitral regurgitation. Trace tricuspid regurgitation. Normal estimated pulmonary pressures. There was no pericardial effusion. Compared to the TTE 11/14/2022 there have been no significant interval changes [de-identified] : RHC / LHC: 6/2/21, Ostial LM 30 % stenosis. pLAD 60% stenosis in the distal third of the vessel segment, at the origin of D1. dLCX 50% stenosis in the proximal third of the vessel segment. p RCA 60 % stenosis. mRCA 60 % stenosis

## 2024-09-25 NOTE — COUNSELING
[Yes] : Risk of tobacco use and health benefits of smoking cessation discussed: Yes [Cessation strategies including cessation program discussed] : Cessation strategies including cessation program discussed [Use of nicotine replacement therapies and other medications discussed] : Use of nicotine replacement therapies and other medications discussed [Encouraged to pick a quit date and identify support needed to quit] : Encouraged to pick a quit date and identify support needed to quit [Smoking Cessation Program Referral] : Smoking Cessation Program Referral  [No] : Not willing to quit smoking [FreeTextEntry2] : [facility] Smoking Cessation Program [FreeTextEntry3] : 15

## 2024-09-25 NOTE — DISCUSSION/SUMMARY
[FreeTextEntry1] : SMOKING CESSATION: We all know the health risks of smoking, and most of us know that kicking the habit is the single biggest improvement to health a smoker can make. But that doesn't make it any easier to kick the habit. Whether you're a teen smoker or a lifetime pack-a-day smoker, quitting can be tough. To increase your chances of success, you need to be motivated, have social support, an understanding of what to expect, and a personal game plan. It is possible to learn how to replace your smoking habits, manage your cravings, and join the millions of people who have kicked the habit for good. Smoking tobacco is both a psychological habit and a physical addiction. The act of smoking is ingrained as a daily ritual and, at the same time, the nicotine from cigarettes provides a temporary, and addictive, high. Eliminating that regular fix of nicotine will cause your body to experience physical withdrawal symptoms and cravings. To successfully quit smoking, you'll need to address both the habit and the addiction by changing your behavior and dealing with nicotine withdrawal symptoms. Relieving unpleasant and overwhelming feelings without cigarettes Managing unpleasant feelings such as stress, depression, loneliness, fear, and anxiety are some of the most common reasons why adults smoke. When you have a bad day, it can seem like your cigarettes are your only friend. Smoking can temporarily make feelings such as sadness, stress, anxiety, depression, and boredom evaporate into thin air. As much comfort as cigarettes provide, though, it's important to remember that there are healthier (and more effective) ways to keep unpleasant feelings in check. These may include exercising, meditating, using sensory relaxation strategies, and practicing simple breathing exercises.  For many people, an important aspect of quitting smoking is to find alternate ways to handle these difficult feelings without smoking. Even when cigarettes are no longer a part of your life, the painful and unpleasant feelings that may have prompted you to smoke in the past will still remain. So, it's worth spending some time thinking about the different ways you intend to deal with stressful situations and the daily irritations that would normally have you reaching for a cigarette. Tailoring a personal game plan to your specific needs and desires can be a big help. List the reasons why you want to quit and then keep copies of the list in the places where you'd normally keep your cigarettes, such as in your jacket, purse, or car. Your reasons for quitting smoking might include: I will feel healthier and have more energy, whiter teeth and fresher breath. I will lower my risk for cancer, heart attacks, strokes, early death, cataracts, and skin wrinkling. I will make myself and my partner, friends, and family proud of me. I will no longer expose my children and others to the dangers of my second-hand smoke. I will have a healthier baby (If you or your partner is pregnant). I will have more money to spend. I won't have to worry: "When will I get to smoke next?" Questions to ask yourself To successfully detach from smoking, you will need to identify and address your smoking habits, the true nature of your dependency, and the techniques that work for you. These types of questions can help: Do you feel the need to smoke at every meal? Are you more of a social smoker? Is it a very bad addiction (more than a pack a day)? Or would a simple nicotine patch do the job? Is your cigarette smoking linked to other addictions, such as alcohol or gambling? Are you open to hypnotherapy and/or acupuncture? Are you someone who is open to talking about your addiction with a therapist or counselor? Are you interested in getting into a fitness program? Take the time to think of what kind of smoker you are, which moments of your life call for a cigarette, and why. This will help you to identify which tips, techniques or therapies may be most beneficial for you.  Start your stop smoking plan with START S = Set a quit date. T = Tell family, friends, and co-workers that you plan to quit. A = Anticipate and plan for the challenges you'll face while quitting. R = Remove cigarettes and other tobacco products from your home, car, and work. T = Talk to your doctor about getting help to quit.  After quitting, you may feel dizzy, restless, or even have strong headaches because you're lacking the immediate release of sugar that comes from nicotine. You may also have a bigger appetite. These sugar-related cravings should only last a few days until your body adjusts so keep your sugar levels a bit higher than usual on those days by drinking plenty of juice (unless you're a diabetic). It will help prevent the craving symptoms and help your body re-adjust back to normal. Tips for managing other cigarette cravings Cravings associated with meals For some smokers, ending a meal means lighting up, and the prospect of giving that up may appear daunting. TIP: replace that moment after a meal with something such as a piece of fruit, a (healthy) dessert, a square of chocolate, or a stick of gum.  Alcohol and cigarettes Many people have a habit of smoking when they have an alcoholic drink. TIP: try non-alcoholic drinks, or try drinking only in bars, restaurant-bars, or friends' houses where smoking inside is prohibited. Or try snacking on nuts and chips, or chewing on a straw or cocktail stick.  Cravings associated with social smoking When friends, family, and co-workers smoke around you, it is doubly difficult to quit or avoid relapse. TIP: Your social circles need to know that you are changing your habits so talk about your decision to quit. Let them know they won't be able to smoke when you're in the car with them or taking a coffee break together.   In your workplace, don't take all your coffee breaks with smokers only, do something else instead, or find non-smokers to have your breaks with. Additional tips to deal with cravings and withdrawal symptoms Stay active: Keep yourself distracted and occupied, go for walks. Keep your hands/fingers busy: Squeeze balls, pencils, or paper clips are good substitutes to satisfy that need for tactile stimulation. Keep your mind busy: Read a book or magazine, listen to some music you love. Find an oral substitute: Keep other things around to pop in your mouth when you're craving a cigarette. Good choices include mints, hard candy, carrot or celery sticks, gum, and sunflower seeds. Drink lots of water: Flushing toxins from your body minimizes withdrawal symptoms and helps cravings pass faster. Look for new ways to relax and to cope with depression or anxiety: There are a lot of ways to improve your mood without smoking.   Finding the resources and support to quit smoking There are many different methods that have successfully helped people to quit smoking, including: Quitting smoking cold turkey. Systematically decreasing the number of cigarettes you smoke. Reducing your intake of nicotine gradually over time. Using nicotine replacement therapy or non-nicotine medications to reduce withdrawal symptoms. Utilizing nicotine support groups. Trying hypnosis, acupuncture, or counseling using cognitive behavioral techniques. You may be successful with the first method you try. More likely, you'll have to try a number of different methods or a combination of treatments to find the ones that work best for you.  Medication therapy Smoking cessation medications can ease withdrawal symptoms and reduce cravings, and are most effective when used as part of a comprehensive stop smoking program monitored by your physician. Talk to your doctor about your options and whether an anti-smoking medication is right for you. [place] Food and Drug Administration (FDA) approved options are:   Nicotine Replacement Therapy Nicotine replacement therapy involves "replacing" cigarettes with other nicotine substitutes, such as nicotine gum or a nicotine patch. It works by delivering small and steady doses of nicotine into the body to relieve some of the withdrawal symptoms without the tars and poisonous gases found in cigarettes. This type of treatment helps smokers focus on breaking their psychological addiction and makes it easier to concentrate on learning new behaviors and coping skills.  Non-Nicotine Medication These medications help you stop smoking by reducing cravings and withdrawal symptoms without the use of nicotine. Medications such as bupropion (Zyban) and varenicline (Chantix) are intended for short-term use only.  Non-medication therapies There are several things you can do to stop smoking that don't involve nicotine replacement therapy or prescription medications: Hypnosis A popular option that has produced good results. Forget anything you may have seen from stage hypnotists, hypnosis works by getting you into a deeply relaxed state where you are open to suggestions that strengthen your resolve to quit smoking and increase your negative feelings toward cigarettes. Ask your doctor to recommend a qualified smoking cessation hypnotherapist in your area or refer to the American Society of Clinical Hypnosis (ASCH) for guidelines on selecting a qualified professional. Acupuncture One of the oldest known medical techniques, acupuncture is believed to work by triggering the release of endorphins (natural pain relievers) that allow the body to relax. As a smoking cessation aid, acupuncture can be helpful in managing smoking withdrawal symptoms. Ask your doctor for a referral or search for a local practitioner at the [employer] Association of Acup[employer] and [employer] (AAAOM). Behavioral Therapy Nicotine addiction is related to the habitual behaviors (the "rituals") involved in smoking. Behavior therapy focuses on learning new coping skills and breaking those habits. The American Lung Association offers a free online smoking cessation program that focuses on behavioral change. To find a local behavioral therapist, check with your doctor or search at the Association for Behavioral and Cognitive Therapies (ABCT). Motivational Therapies Self-help books and websites can provide a number of ways to motivate yourself to quit smoking. One well known example is calculating the monetary savings. Some people have been able to find the motivation to quit just by calculating how much money they will save after they quit. It may be enough to pay for a summer vacation.  WEIGHT GOALS: Category				BMI range - kg/m2	BMI Prime Very severely underweight		less than 15		less than 0.60	 Severely underweight			from 15.0 to 16.0		from 0.60 to 0.64	 Underweight				from 16.0 to 18.5		from 0.64 to 0.74	 Normal (healthy weight)			from 18.5 to 25		from 0.74 to 1.0	 Overweight				from 25 to 30		from 1.0 to 1.2	 Obese Class I (Moderately obese)		from 30 to 35		from 1.2 to 1.4	 Obese Class II (Severely obese)		from 35 to 40		from 1.4 to 1.6	 Obese Class III (Very severely obese)	over 40	over 1.6				  Your current weight is 101 lbs (101 lbs on 2/23/24; 99 lbs on 4/5/23; 100 lbs on 2/5/2020; 105 lbs on 7/31/19; 85 lbs on 1/16/19; 81 lbs on 6/21/18). Given current weight and height 5'4", your calculated body mass index (BMI) is 17.34 kg/sqm. Normal BMI is 18.5-25 kg/sqm. Thus, current weight is in the underweight category. Abdominal waist circumference is measured at the level of the umbilicus and is thus not a pants waist measurement. Your current abdominal waist circumference is 31" in (31" on 2/23/24; 31.5" on 4/5/23; 31 in on 2/24/21). Normal abdominal waist circumference is < 32 inches for women and < 37 inches for men.
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
## 2024-09-25 NOTE — ADDENDUM
[FreeTextEntry1] : I spent 60 minutes face to face time with the patient from 10:00 to 11:00 on 9/25/24. Approx 15 min were devoted to smoking cessation, with literature provided regarding [facility] Smoking Cessation Program. Prior to this visit, I reviewed office records of surgical oncology, neurology and structural heart and valve clinic. Labs 2/29/24, 7/23/24 and 9/10/24 were reviewed. This preparation took 20 minutes. Thus, total visit time was 80 minutes.

## 2024-09-25 NOTE — DISCUSSION/SUMMARY
[FreeTextEntry1] : SMOKING CESSATION: We all know the health risks of smoking, and most of us know that kicking the habit is the single biggest improvement to health a smoker can make. But that doesn't make it any easier to kick the habit. Whether you're a teen smoker or a lifetime pack-a-day smoker, quitting can be tough. To increase your chances of success, you need to be motivated, have social support, an understanding of what to expect, and a personal game plan. It is possible to learn how to replace your smoking habits, manage your cravings, and join the millions of people who have kicked the habit for good. Smoking tobacco is both a psychological habit and a physical addiction. The act of smoking is ingrained as a daily ritual and, at the same time, the nicotine from cigarettes provides a temporary, and addictive, high. Eliminating that regular fix of nicotine will cause your body to experience physical withdrawal symptoms and cravings. To successfully quit smoking, you'll need to address both the habit and the addiction by changing your behavior and dealing with nicotine withdrawal symptoms. Relieving unpleasant and overwhelming feelings without cigarettes Managing unpleasant feelings such as stress, depression, loneliness, fear, and anxiety are some of the most common reasons why adults smoke. When you have a bad day, it can seem like your cigarettes are your only friend. Smoking can temporarily make feelings such as sadness, stress, anxiety, depression, and boredom evaporate into thin air. As much comfort as cigarettes provide, though, it's important to remember that there are healthier (and more effective) ways to keep unpleasant feelings in check. These may include exercising, meditating, using sensory relaxation strategies, and practicing simple breathing exercises.  For many people, an important aspect of quitting smoking is to find alternate ways to handle these difficult feelings without smoking. Even when cigarettes are no longer a part of your life, the painful and unpleasant feelings that may have prompted you to smoke in the past will still remain. So, it's worth spending some time thinking about the different ways you intend to deal with stressful situations and the daily irritations that would normally have you reaching for a cigarette. Tailoring a personal game plan to your specific needs and desires can be a big help. List the reasons why you want to quit and then keep copies of the list in the places where you'd normally keep your cigarettes, such as in your jacket, purse, or car. Your reasons for quitting smoking might include: I will feel healthier and have more energy, whiter teeth and fresher breath. I will lower my risk for cancer, heart attacks, strokes, early death, cataracts, and skin wrinkling. I will make myself and my partner, friends, and family proud of me. I will no longer expose my children and others to the dangers of my second-hand smoke. I will have a healthier baby (If you or your partner is pregnant). I will have more money to spend. I won't have to worry: "When will I get to smoke next?" Questions to ask yourself To successfully detach from smoking, you will need to identify and address your smoking habits, the true nature of your dependency, and the techniques that work for you. These types of questions can help: Do you feel the need to smoke at every meal? Are you more of a social smoker? Is it a very bad addiction (more than a pack a day)? Or would a simple nicotine patch do the job? Is your cigarette smoking linked to other addictions, such as alcohol or gambling? Are you open to hypnotherapy and/or acupuncture? Are you someone who is open to talking about your addiction with a therapist or counselor? Are you interested in getting into a fitness program? Take the time to think of what kind of smoker you are, which moments of your life call for a cigarette, and why. This will help you to identify which tips, techniques or therapies may be most beneficial for you.  Start your stop smoking plan with START S = Set a quit date. T = Tell family, friends, and co-workers that you plan to quit. A = Anticipate and plan for the challenges you'll face while quitting. R = Remove cigarettes and other tobacco products from your home, car, and work. T = Talk to your doctor about getting help to quit.  After quitting, you may feel dizzy, restless, or even have strong headaches because you're lacking the immediate release of sugar that comes from nicotine. You may also have a bigger appetite. These sugar-related cravings should only last a few days until your body adjusts so keep your sugar levels a bit higher than usual on those days by drinking plenty of juice (unless you're a diabetic). It will help prevent the craving symptoms and help your body re-adjust back to normal. Tips for managing other cigarette cravings Cravings associated with meals For some smokers, ending a meal means lighting up, and the prospect of giving that up may appear daunting. TIP: replace that moment after a meal with something such as a piece of fruit, a (healthy) dessert, a square of chocolate, or a stick of gum.  Alcohol and cigarettes Many people have a habit of smoking when they have an alcoholic drink. TIP: try non-alcoholic drinks, or try drinking only in bars, restaurant-bars, or friends' houses where smoking inside is prohibited. Or try snacking on nuts and chips, or chewing on a straw or cocktail stick.  Cravings associated with social smoking When friends, family, and co-workers smoke around you, it is doubly difficult to quit or avoid relapse. TIP: Your social circles need to know that you are changing your habits so talk about your decision to quit. Let them know they won't be able to smoke when you're in the car with them or taking a coffee break together.   In your workplace, don't take all your coffee breaks with smokers only, do something else instead, or find non-smokers to have your breaks with. Additional tips to deal with cravings and withdrawal symptoms Stay active: Keep yourself distracted and occupied, go for walks. Keep your hands/fingers busy: Squeeze balls, pencils, or paper clips are good substitutes to satisfy that need for tactile stimulation. Keep your mind busy: Read a book or magazine, listen to some music you love. Find an oral substitute: Keep other things around to pop in your mouth when you're craving a cigarette. Good choices include mints, hard candy, carrot or celery sticks, gum, and sunflower seeds. Drink lots of water: Flushing toxins from your body minimizes withdrawal symptoms and helps cravings pass faster. Look for new ways to relax and to cope with depression or anxiety: There are a lot of ways to improve your mood without smoking.   Finding the resources and support to quit smoking There are many different methods that have successfully helped people to quit smoking, including: Quitting smoking cold turkey. Systematically decreasing the number of cigarettes you smoke. Reducing your intake of nicotine gradually over time. Using nicotine replacement therapy or non-nicotine medications to reduce withdrawal symptoms. Utilizing nicotine support groups. Trying hypnosis, acupuncture, or counseling using cognitive behavioral techniques. You may be successful with the first method you try. More likely, you'll have to try a number of different methods or a combination of treatments to find the ones that work best for you.  Medication therapy Smoking cessation medications can ease withdrawal symptoms and reduce cravings, and are most effective when used as part of a comprehensive stop smoking program monitored by your physician. Talk to your doctor about your options and whether an anti-smoking medication is right for you. [place] Food and Drug Administration (FDA) approved options are:   Nicotine Replacement Therapy Nicotine replacement therapy involves "replacing" cigarettes with other nicotine substitutes, such as nicotine gum or a nicotine patch. It works by delivering small and steady doses of nicotine into the body to relieve some of the withdrawal symptoms without the tars and poisonous gases found in cigarettes. This type of treatment helps smokers focus on breaking their psychological addiction and makes it easier to concentrate on learning new behaviors and coping skills.  Non-Nicotine Medication These medications help you stop smoking by reducing cravings and withdrawal symptoms without the use of nicotine. Medications such as bupropion (Zyban) and varenicline (Chantix) are intended for short-term use only.  Non-medication therapies There are several things you can do to stop smoking that don't involve nicotine replacement therapy or prescription medications: Hypnosis A popular option that has produced good results. Forget anything you may have seen from stage hypnotists, hypnosis works by getting you into a deeply relaxed state where you are open to suggestions that strengthen your resolve to quit smoking and increase your negative feelings toward cigarettes. Ask your doctor to recommend a qualified smoking cessation hypnotherapist in your area or refer to the American Society of Clinical Hypnosis (ASCH) for guidelines on selecting a qualified professional. Acupuncture One of the oldest known medical techniques, acupuncture is believed to work by triggering the release of endorphins (natural pain relievers) that allow the body to relax. As a smoking cessation aid, acupuncture can be helpful in managing smoking withdrawal symptoms. Ask your doctor for a referral or search for a local practitioner at the [employer] Association of Acup[employer] and [place] [employer] (AAAOM). Behavioral Therapy Nicotine addiction is related to the habitual behaviors (the "rituals") involved in smoking. Behavior therapy focuses on learning new coping skills and breaking those habits. The American [employer] Association offers a free online smoking cessation program that focuses on behavioral change. To find a local behavioral therapist, check with your doctor or search at the Association for Behavioral and Cognitive Therapies (ABCT). Motivational Therapies Self-help books and websites can provide a number of ways to motivate yourself to quit smoking. One well known example is calculating the monetary savings. Some people have been able to find the motivation to quit just by calculating how much money they will save after they quit. It may be enough to pay for a summer vacation.  WEIGHT GOALS: Category				BMI range - kg/m2	BMI Prime Very severely underweight		less than 15		less than 0.60	 Severely underweight			from 15.0 to 16.0		from 0.60 to 0.64	 Underweight				from 16.0 to 18.5		from 0.64 to 0.74	 Normal (healthy weight)			from 18.5 to 25		from 0.74 to 1.0	 Overweight				from 25 to 30		from 1.0 to 1.2	 Obese Class I (Moderately obese)		from 30 to 35		from 1.2 to 1.4	 Obese Class II (Severely obese)		from 35 to 40		from 1.4 to 1.6	 Obese Class III (Very severely obese)	over 40	over 1.6				  Your current weight is 101 lbs (101 lbs on 2/23/24; 99 lbs on 4/5/23; 100 lbs on 2/5/2020; 105 lbs on 7/31/19; 85 lbs on 1/16/19; 81 lbs on 6/21/18). Given current weight and height 5'4", your calculated body mass index (BMI) is 17.34 kg/sqm. Normal BMI is 18.5-25 kg/sqm. Thus, current weight is in the underweight category. Abdominal waist circumference is measured at the level of the umbilicus and is thus not a pants waist measurement. Your current abdominal waist circumference is 31" in (31" on 2/23/24; 31.5" on 4/5/23; 31 in on 2/24/21). Normal abdominal waist circumference is < 32 inches for women and < 37 inches for men.

## 2024-09-25 NOTE — CARDIOLOGY SUMMARY
[de-identified] : 9/25/24, normal sinus rhythm at 69 bpm. There were ST-segment and T-wave changes in leads II, III, aVF, V3-V6.  Prior ECG 2/23/24 showed normal sinus rhythm at 76 bpm, with non-specific ST-segment and T-wave changes. Rhythm / RR - interval analysis 9/25/24 observed 104 beats over 90 sec with mean HR 70 bpm; mean  ms (640-1108) Max/Min 173%; RR SD 36 and RR CV 4%. There was 1 PVC [de-identified] : 6/18/21, small moderate anteroapical and distal inferior defects that are fixed, consistent with infarct. No segmental wall motion abnormality; LVEF 61%. There was no significant change compared to prior study 3/7/17. [de-identified] : 11/30/23, The LV cavity was normal in size. LV systolic function was normal with EF 54 %. There was hypokinesis of the basal inferior and basal inferolateral walls. All remaining wall segments were normal. LV global longitudinal strain was -11.1 % which was abnormal (normal > -16%). Images were acquired with HR 78 bpm /79 mmHg. RV cavity was normal in size and systolic function. DAGO ACE RAND on A2/P2. The device was well seated and attached. The transmitral peak gradient was 11.6 mmHg and mean gradient 4.00 mmHg with HR 88 bpm. There was mild mitral regurgitation. Trace tricuspid regurgitation. Normal estimated pulmonary pressures. There was no pericardial effusion. Compared to the TTE 11/14/2022 there have been no significant interval changes [de-identified] : RHC / LHC: 6/2/21, Ostial LM 30 % stenosis. pLAD 60% stenosis in the distal third of the vessel segment, at the origin of D1. dLCX 50% stenosis in the proximal third of the vessel segment. p RCA 60 % stenosis. mRCA 60 % stenosis

## 2024-09-25 NOTE — REVIEW OF SYSTEMS
[Under Stress] : under stress [Negative] : Heme/Lymph [Fever] : no fever [Headache] : no headache [Weight Gain (___ Lbs)] : no recent weight gain [Chills] : no chills [Feeling Fatigued] : not feeling fatigued [Weight Loss (___ Lbs)] : no recent weight loss [de-identified] : see HPI [de-identified] : see HPI
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
## 2024-10-08 ENCOUNTER — APPOINTMENT (OUTPATIENT)
Dept: GASTROENTEROLOGY | Facility: CLINIC | Age: 69
End: 2024-10-08
Payer: MEDICARE

## 2024-10-08 VITALS
DIASTOLIC BLOOD PRESSURE: 80 MMHG | BODY MASS INDEX: 18.77 KG/M2 | HEART RATE: 78 BPM | HEIGHT: 62 IN | SYSTOLIC BLOOD PRESSURE: 142 MMHG | WEIGHT: 102 LBS

## 2024-10-08 DIAGNOSIS — K57.10 DIVERTICULOSIS OF SMALL INTESTINE W/OUT PERFORATION OR ABSCESS W/OUT BLEEDING: ICD-10-CM

## 2024-10-08 DIAGNOSIS — Z86.0100 PERSONAL HISTORY OF COLON POLYPS, UNSPECIFIED: ICD-10-CM

## 2024-10-08 DIAGNOSIS — R93.5 ABNORMAL FINDINGS ON DIAGNOSTIC IMAGING OF OTHER ABDOMINAL REGIONS, INCLUDING RETROPERITONEUM: ICD-10-CM

## 2024-10-08 DIAGNOSIS — K58.0 IRRITABLE BOWEL SYNDROME WITH DIARRHEA: ICD-10-CM

## 2024-10-08 DIAGNOSIS — Z85.3 PERSONAL HISTORY OF MALIGNANT NEOPLASM OF BREAST: ICD-10-CM

## 2024-10-08 DIAGNOSIS — R14.0 ABDOMINAL DISTENSION (GASEOUS): ICD-10-CM

## 2024-10-08 PROCEDURE — 82274 ASSAY TEST FOR BLOOD FECAL: CPT | Mod: QW

## 2024-10-08 PROCEDURE — 99205 OFFICE O/P NEW HI 60 MIN: CPT | Mod: 25

## 2024-10-08 RX ORDER — RIFAXIMIN 550 MG/1
550 TABLET ORAL
Qty: 42 | Refills: 2 | Status: ACTIVE | COMMUNITY
Start: 2024-10-08 | End: 1900-01-01

## 2024-10-09 ENCOUNTER — RESULT REVIEW (OUTPATIENT)
Age: 69
End: 2024-10-09

## 2024-10-09 ENCOUNTER — OUTPATIENT (OUTPATIENT)
Dept: OUTPATIENT SERVICES | Facility: HOSPITAL | Age: 69
LOS: 1 days | End: 2024-10-09
Payer: MEDICARE

## 2024-10-09 ENCOUNTER — APPOINTMENT (OUTPATIENT)
Dept: MAMMOGRAPHY | Facility: IMAGING CENTER | Age: 69
End: 2024-10-09
Payer: MEDICARE

## 2024-10-09 ENCOUNTER — APPOINTMENT (OUTPATIENT)
Dept: ULTRASOUND IMAGING | Facility: IMAGING CENTER | Age: 69
End: 2024-10-09
Payer: MEDICARE

## 2024-10-09 DIAGNOSIS — Z94.5 SKIN TRANSPLANT STATUS: Chronic | ICD-10-CM

## 2024-10-09 DIAGNOSIS — Z90.89 ACQUIRED ABSENCE OF OTHER ORGANS: Chronic | ICD-10-CM

## 2024-10-09 DIAGNOSIS — Z00.8 ENCOUNTER FOR OTHER GENERAL EXAMINATION: ICD-10-CM

## 2024-10-09 PROCEDURE — G0279: CPT | Mod: 26

## 2024-10-09 PROCEDURE — 76642 ULTRASOUND BREAST LIMITED: CPT | Mod: 26,50

## 2024-10-09 PROCEDURE — 77065 DX MAMMO INCL CAD UNI: CPT

## 2024-10-09 PROCEDURE — 77065 DX MAMMO INCL CAD UNI: CPT | Mod: 26,RT

## 2024-10-09 PROCEDURE — G0279: CPT

## 2024-10-09 PROCEDURE — 76642 ULTRASOUND BREAST LIMITED: CPT

## 2024-10-10 ENCOUNTER — APPOINTMENT (OUTPATIENT)
Dept: INTERNAL MEDICINE | Facility: CLINIC | Age: 69
End: 2024-10-10
Payer: MEDICARE

## 2024-10-10 VITALS
TEMPERATURE: 98.3 F | BODY MASS INDEX: 18.77 KG/M2 | SYSTOLIC BLOOD PRESSURE: 133 MMHG | HEIGHT: 62 IN | HEART RATE: 90 BPM | DIASTOLIC BLOOD PRESSURE: 87 MMHG | WEIGHT: 102 LBS | OXYGEN SATURATION: 97 %

## 2024-10-10 DIAGNOSIS — E78.5 HYPERLIPIDEMIA, UNSPECIFIED: ICD-10-CM

## 2024-10-10 DIAGNOSIS — C50.919 MALIGNANT NEOPLASM OF UNSPECIFIED SITE OF UNSPECIFIED FEMALE BREAST: ICD-10-CM

## 2024-10-10 DIAGNOSIS — Z00.00 ENCOUNTER FOR GENERAL ADULT MEDICAL EXAMINATION W/OUT ABNORMAL FINDINGS: ICD-10-CM

## 2024-10-10 DIAGNOSIS — R19.7 DIARRHEA, UNSPECIFIED: ICD-10-CM

## 2024-10-10 DIAGNOSIS — M81.0 AGE-RELATED OSTEOPOROSIS W/OUT CURRENT PATHOLOGICAL FRACTURE: ICD-10-CM

## 2024-10-10 DIAGNOSIS — G40.909 EPILEPSY, UNSPECIFIED, NOT INTRACTABLE, W/OUT STATUS EPILEPTICUS: ICD-10-CM

## 2024-10-10 PROCEDURE — G0008: CPT

## 2024-10-10 PROCEDURE — 99214 OFFICE O/P EST MOD 30 MIN: CPT

## 2024-10-10 PROCEDURE — 90662 IIV NO PRSV INCREASED AG IM: CPT

## 2024-10-14 ENCOUNTER — APPOINTMENT (OUTPATIENT)
Dept: NEUROLOGY | Facility: CLINIC | Age: 69
End: 2024-10-14
Payer: MEDICARE

## 2024-10-14 VITALS
DIASTOLIC BLOOD PRESSURE: 78 MMHG | WEIGHT: 102 LBS | HEIGHT: 62 IN | HEART RATE: 101 BPM | BODY MASS INDEX: 18.77 KG/M2 | SYSTOLIC BLOOD PRESSURE: 150 MMHG

## 2024-10-14 PROCEDURE — G2211 COMPLEX E/M VISIT ADD ON: CPT

## 2024-10-14 PROCEDURE — 99214 OFFICE O/P EST MOD 30 MIN: CPT

## 2024-10-18 ENCOUNTER — RESULT REVIEW (OUTPATIENT)
Age: 69
End: 2024-10-18

## 2024-10-18 ENCOUNTER — APPOINTMENT (OUTPATIENT)
Dept: ULTRASOUND IMAGING | Facility: IMAGING CENTER | Age: 69
End: 2024-10-18
Payer: MEDICARE

## 2024-10-18 ENCOUNTER — OUTPATIENT (OUTPATIENT)
Dept: OUTPATIENT SERVICES | Facility: HOSPITAL | Age: 69
LOS: 1 days | End: 2024-10-18
Payer: MEDICARE

## 2024-10-18 DIAGNOSIS — Z94.5 SKIN TRANSPLANT STATUS: Chronic | ICD-10-CM

## 2024-10-18 DIAGNOSIS — C50.919 MALIGNANT NEOPLASM OF UNSPECIFIED SITE OF UNSPECIFIED FEMALE BREAST: ICD-10-CM

## 2024-10-18 DIAGNOSIS — Z90.89 ACQUIRED ABSENCE OF OTHER ORGANS: Chronic | ICD-10-CM

## 2024-10-18 PROCEDURE — 77065 DX MAMMO INCL CAD UNI: CPT

## 2024-10-18 PROCEDURE — 88305 TISSUE EXAM BY PATHOLOGIST: CPT

## 2024-10-18 PROCEDURE — 19083 BX BREAST 1ST LESION US IMAG: CPT | Mod: LT

## 2024-10-18 PROCEDURE — 77065 DX MAMMO INCL CAD UNI: CPT | Mod: 26,LT

## 2024-10-18 PROCEDURE — 88305 TISSUE EXAM BY PATHOLOGIST: CPT | Mod: 26

## 2024-10-18 PROCEDURE — A4648: CPT

## 2024-10-18 PROCEDURE — 19083 BX BREAST 1ST LESION US IMAG: CPT

## 2024-10-22 LAB — SURGICAL PATHOLOGY STUDY: SIGNIFICANT CHANGE UP

## 2024-10-24 ENCOUNTER — RX RENEWAL (OUTPATIENT)
Age: 69
End: 2024-10-24

## 2024-11-06 ENCOUNTER — LABORATORY RESULT (OUTPATIENT)
Age: 69
End: 2024-11-06

## 2024-11-06 ENCOUNTER — NON-APPOINTMENT (OUTPATIENT)
Age: 69
End: 2024-11-06

## 2024-11-06 ENCOUNTER — APPOINTMENT (OUTPATIENT)
Dept: PAIN MANAGEMENT | Facility: CLINIC | Age: 69
End: 2024-11-06
Payer: MEDICARE

## 2024-11-06 VITALS
WEIGHT: 102 LBS | HEIGHT: 62 IN | SYSTOLIC BLOOD PRESSURE: 123 MMHG | DIASTOLIC BLOOD PRESSURE: 77 MMHG | BODY MASS INDEX: 18.77 KG/M2 | HEART RATE: 92 BPM

## 2024-11-06 DIAGNOSIS — G89.4 CHRONIC PAIN SYNDROME: ICD-10-CM

## 2024-11-06 PROCEDURE — 99214 OFFICE O/P EST MOD 30 MIN: CPT

## 2024-11-06 PROCEDURE — G2211 COMPLEX E/M VISIT ADD ON: CPT

## 2024-11-07 ENCOUNTER — RESULT REVIEW (OUTPATIENT)
Age: 69
End: 2024-11-07

## 2024-11-07 ENCOUNTER — OUTPATIENT (OUTPATIENT)
Dept: OUTPATIENT SERVICES | Facility: HOSPITAL | Age: 69
LOS: 1 days | End: 2024-11-07
Payer: MEDICARE

## 2024-11-07 ENCOUNTER — APPOINTMENT (OUTPATIENT)
Dept: CARDIOTHORACIC SURGERY | Facility: CLINIC | Age: 69
End: 2024-11-07
Payer: MEDICARE

## 2024-11-07 VITALS
HEART RATE: 72 BPM | RESPIRATION RATE: 18 BRPM | OXYGEN SATURATION: 97 % | WEIGHT: 102 LBS | DIASTOLIC BLOOD PRESSURE: 74 MMHG | BODY MASS INDEX: 17.42 KG/M2 | HEIGHT: 64 IN | SYSTOLIC BLOOD PRESSURE: 113 MMHG

## 2024-11-07 DIAGNOSIS — I34.0 NONRHEUMATIC MITRAL (VALVE) INSUFFICIENCY: ICD-10-CM

## 2024-11-07 DIAGNOSIS — I35.0 NONRHEUMATIC AORTIC (VALVE) STENOSIS: ICD-10-CM

## 2024-11-07 DIAGNOSIS — Z90.89 ACQUIRED ABSENCE OF OTHER ORGANS: Chronic | ICD-10-CM

## 2024-11-07 DIAGNOSIS — Z94.5 SKIN TRANSPLANT STATUS: Chronic | ICD-10-CM

## 2024-11-07 PROCEDURE — 93356 MYOCRD STRAIN IMG SPCKL TRCK: CPT

## 2024-11-07 PROCEDURE — 99213 OFFICE O/P EST LOW 20 MIN: CPT

## 2024-11-07 PROCEDURE — 93306 TTE W/DOPPLER COMPLETE: CPT | Mod: 26

## 2024-11-07 PROCEDURE — 93306 TTE W/DOPPLER COMPLETE: CPT

## 2024-11-13 LAB
AMPHET UR-MCNC: NEGATIVE NG/ML
BARBITURATES UR-MCNC: NEGATIVE NG/ML
BENZODIAZ UR-MCNC: NORMAL NG/ML
COCAINE METAB.OTHER UR-MCNC: NEGATIVE NG/ML
CREATININE, URINE: 115.9 MG/DL
FENTANYL, URINE: NEGATIVE NG/ML
METHADONE SCREEN, UR: NEGATIVE NG/ML
OPIATES UR-MCNC: NORMAL NG/ML
OXYCODONE/OXYMORPHONE, URINE: NEGATIVE NG/ML
PCP UR-MCNC: NEGATIVE NG/ML
PH, URINE: 5.8
THC UR QL: NEGATIVE NG/ML

## 2024-11-18 ENCOUNTER — NON-APPOINTMENT (OUTPATIENT)
Age: 69
End: 2024-11-18

## 2025-01-30 ENCOUNTER — NON-APPOINTMENT (OUTPATIENT)
Age: 70
End: 2025-01-30

## 2025-01-30 DIAGNOSIS — Z85.3 ENCOUNTER FOR FOLLOW-UP EXAMINATION AFTER COMPLETED TREATMENT FOR MALIGNANT NEOPLASM: ICD-10-CM

## 2025-01-30 DIAGNOSIS — Z08 ENCOUNTER FOR FOLLOW-UP EXAMINATION AFTER COMPLETED TREATMENT FOR MALIGNANT NEOPLASM: ICD-10-CM

## 2025-02-07 ENCOUNTER — APPOINTMENT (OUTPATIENT)
Dept: NEUROLOGY | Facility: CLINIC | Age: 70
End: 2025-02-07
Payer: MEDICARE

## 2025-02-07 ENCOUNTER — NON-APPOINTMENT (OUTPATIENT)
Age: 70
End: 2025-02-07

## 2025-02-07 VITALS
SYSTOLIC BLOOD PRESSURE: 139 MMHG | BODY MASS INDEX: 17.42 KG/M2 | HEART RATE: 81 BPM | HEIGHT: 64 IN | WEIGHT: 102 LBS | DIASTOLIC BLOOD PRESSURE: 85 MMHG

## 2025-02-07 PROCEDURE — 99214 OFFICE O/P EST MOD 30 MIN: CPT

## 2025-02-07 PROCEDURE — G2211 COMPLEX E/M VISIT ADD ON: CPT

## 2025-02-11 ENCOUNTER — APPOINTMENT (OUTPATIENT)
Dept: SURGICAL ONCOLOGY | Facility: CLINIC | Age: 70
End: 2025-02-11
Payer: MEDICARE

## 2025-02-11 VITALS
HEIGHT: 64 IN | DIASTOLIC BLOOD PRESSURE: 93 MMHG | RESPIRATION RATE: 17 BRPM | WEIGHT: 102 LBS | BODY MASS INDEX: 17.42 KG/M2 | OXYGEN SATURATION: 99 % | SYSTOLIC BLOOD PRESSURE: 154 MMHG | HEART RATE: 89 BPM

## 2025-02-11 DIAGNOSIS — M81.0 AGE-RELATED OSTEOPOROSIS W/OUT CURRENT PATHOLOGICAL FRACTURE: ICD-10-CM

## 2025-02-11 DIAGNOSIS — Z08 ENCOUNTER FOR FOLLOW-UP EXAMINATION AFTER COMPLETED TREATMENT FOR MALIGNANT NEOPLASM: ICD-10-CM

## 2025-02-11 DIAGNOSIS — Z85.3 ENCOUNTER FOR FOLLOW-UP EXAMINATION AFTER COMPLETED TREATMENT FOR MALIGNANT NEOPLASM: ICD-10-CM

## 2025-02-11 PROCEDURE — 99213 OFFICE O/P EST LOW 20 MIN: CPT

## 2025-02-19 DIAGNOSIS — M81.0 AGE-RELATED OSTEOPOROSIS W/OUT CURRENT PATHOLOGICAL FRACTURE: ICD-10-CM

## 2025-02-19 DIAGNOSIS — E55.9 VITAMIN D DEFICIENCY, UNSPECIFIED: ICD-10-CM

## 2025-02-19 DIAGNOSIS — E21.3 HYPERPARATHYROIDISM, UNSPECIFIED: ICD-10-CM

## 2025-03-01 LAB
25(OH)D3 SERPL-MCNC: 18.3 NG/ML
ALBUMIN SERPL ELPH-MCNC: 4.1 G/DL
ALP BLD-CCNC: 105 U/L
ALT SERPL-CCNC: 6 U/L
ANION GAP SERPL CALC-SCNC: 18 MMOL/L
AST SERPL-CCNC: 16 U/L
BILIRUB SERPL-MCNC: 0.2 MG/DL
BUN SERPL-MCNC: 11 MG/DL
CALCIUM SERPL-MCNC: 9.3 MG/DL
CALCIUM SERPL-MCNC: 9.3 MG/DL
CHLORIDE SERPL-SCNC: 107 MMOL/L
CO2 SERPL-SCNC: 21 MMOL/L
CREAT SERPL-MCNC: 0.64 MG/DL
EGFR: 96 ML/MIN/1.73M2
PARATHYROID HORMONE INTACT: 82 PG/ML
POTASSIUM SERPL-SCNC: 4.6 MMOL/L
PROT SERPL-MCNC: 7.3 G/DL
SODIUM SERPL-SCNC: 145 MMOL/L

## 2025-03-06 DIAGNOSIS — E55.9 VITAMIN D DEFICIENCY, UNSPECIFIED: ICD-10-CM

## 2025-03-06 DIAGNOSIS — E21.3 HYPERPARATHYROIDISM, UNSPECIFIED: ICD-10-CM

## 2025-03-06 RX ORDER — ERGOCALCIFEROL 1.25 MG/1
1.25 MG CAPSULE, LIQUID FILLED ORAL
Qty: 8 | Refills: 0 | Status: ACTIVE | COMMUNITY
Start: 2025-03-06 | End: 1900-01-01

## 2025-03-10 ENCOUNTER — APPOINTMENT (OUTPATIENT)
Dept: GASTROENTEROLOGY | Facility: CLINIC | Age: 70
End: 2025-03-10
Payer: MEDICARE

## 2025-03-10 ENCOUNTER — LABORATORY RESULT (OUTPATIENT)
Age: 70
End: 2025-03-10

## 2025-03-10 DIAGNOSIS — K25.9 GASTRIC ULCER, UNSPECIFIED AS ACUTE OR CHRONIC, W/OUT HEMORRHAGE OR PERFORATION: ICD-10-CM

## 2025-03-10 PROCEDURE — 43239 EGD BIOPSY SINGLE/MULTIPLE: CPT | Mod: 59

## 2025-03-10 PROCEDURE — 45380 COLONOSCOPY AND BIOPSY: CPT

## 2025-03-10 RX ORDER — OMEPRAZOLE 40 MG/1
40 CAPSULE, DELAYED RELEASE ORAL
Qty: 90 | Refills: 3 | Status: ACTIVE | COMMUNITY
Start: 2025-03-10 | End: 1900-01-01

## 2025-03-11 ENCOUNTER — NON-APPOINTMENT (OUTPATIENT)
Age: 70
End: 2025-03-11

## 2025-03-13 ENCOUNTER — APPOINTMENT (OUTPATIENT)
Dept: INTERNAL MEDICINE | Facility: CLINIC | Age: 70
End: 2025-03-13
Payer: MEDICARE

## 2025-03-13 VITALS
WEIGHT: 102 LBS | DIASTOLIC BLOOD PRESSURE: 94 MMHG | OXYGEN SATURATION: 97 % | HEIGHT: 64 IN | BODY MASS INDEX: 17.42 KG/M2 | SYSTOLIC BLOOD PRESSURE: 142 MMHG | TEMPERATURE: 97.7 F | HEART RATE: 102 BPM

## 2025-03-13 DIAGNOSIS — G40.909 EPILEPSY, UNSPECIFIED, NOT INTRACTABLE, W/OUT STATUS EPILEPTICUS: ICD-10-CM

## 2025-03-13 DIAGNOSIS — R56.9 UNSPECIFIED CONVULSIONS: ICD-10-CM

## 2025-03-13 DIAGNOSIS — Z00.00 ENCOUNTER FOR GENERAL ADULT MEDICAL EXAMINATION W/OUT ABNORMAL FINDINGS: ICD-10-CM

## 2025-03-13 DIAGNOSIS — J44.9 CHRONIC OBSTRUCTIVE PULMONARY DISEASE, UNSPECIFIED: ICD-10-CM

## 2025-03-13 DIAGNOSIS — C50.919 MALIGNANT NEOPLASM OF UNSPECIFIED SITE OF UNSPECIFIED FEMALE BREAST: ICD-10-CM

## 2025-03-13 PROCEDURE — G0439: CPT

## 2025-03-17 ENCOUNTER — NON-APPOINTMENT (OUTPATIENT)
Age: 70
End: 2025-03-17

## 2025-03-19 ENCOUNTER — APPOINTMENT (OUTPATIENT)
Dept: CARDIOLOGY | Facility: CLINIC | Age: 70
End: 2025-03-19
Payer: MEDICARE

## 2025-03-19 ENCOUNTER — NON-APPOINTMENT (OUTPATIENT)
Age: 70
End: 2025-03-19

## 2025-03-19 VITALS
DIASTOLIC BLOOD PRESSURE: 66 MMHG | TEMPERATURE: 97.6 F | RESPIRATION RATE: 16 BRPM | HEIGHT: 64 IN | HEART RATE: 70 BPM | SYSTOLIC BLOOD PRESSURE: 117 MMHG | OXYGEN SATURATION: 99 % | WEIGHT: 102 LBS | BODY MASS INDEX: 17.42 KG/M2

## 2025-03-19 VITALS — OXYGEN SATURATION: 99 % | DIASTOLIC BLOOD PRESSURE: 67 MMHG | HEART RATE: 73 BPM | SYSTOLIC BLOOD PRESSURE: 136 MMHG

## 2025-03-19 VITALS — HEART RATE: 70 BPM | SYSTOLIC BLOOD PRESSURE: 131 MMHG | DIASTOLIC BLOOD PRESSURE: 67 MMHG | OXYGEN SATURATION: 100 %

## 2025-03-19 VITALS — DIASTOLIC BLOOD PRESSURE: 69 MMHG | OXYGEN SATURATION: 99 % | SYSTOLIC BLOOD PRESSURE: 128 MMHG | HEART RATE: 68 BPM

## 2025-03-19 VITALS — OXYGEN SATURATION: 99 % | HEART RATE: 74 BPM | SYSTOLIC BLOOD PRESSURE: 137 MMHG | DIASTOLIC BLOOD PRESSURE: 69 MMHG

## 2025-03-19 DIAGNOSIS — I49.3 VENTRICULAR PREMATURE DEPOLARIZATION: ICD-10-CM

## 2025-03-19 DIAGNOSIS — Z98.890 OTHER SPECIFIED POSTPROCEDURAL STATES: ICD-10-CM

## 2025-03-19 DIAGNOSIS — I34.0 NONRHEUMATIC MITRAL (VALVE) INSUFFICIENCY: ICD-10-CM

## 2025-03-19 DIAGNOSIS — Z95.4 PRESENCE OF OTHER HEART-VALVE REPLACEMENT: ICD-10-CM

## 2025-03-19 DIAGNOSIS — E78.5 HYPERLIPIDEMIA, UNSPECIFIED: ICD-10-CM

## 2025-03-19 DIAGNOSIS — Z71.3 DIETARY COUNSELING AND SURVEILLANCE: ICD-10-CM

## 2025-03-19 DIAGNOSIS — F17.210 NICOTINE DEPENDENCE, CIGARETTES, UNCOMPLICATED: ICD-10-CM

## 2025-03-19 DIAGNOSIS — I10 ESSENTIAL (PRIMARY) HYPERTENSION: ICD-10-CM

## 2025-03-19 DIAGNOSIS — I95.1 ORTHOSTATIC HYPOTENSION: ICD-10-CM

## 2025-03-19 DIAGNOSIS — I25.10 ATHEROSCLEROTIC HEART DISEASE OF NATIVE CORONARY ARTERY W/OUT ANGINA PECTORIS: ICD-10-CM

## 2025-03-19 DIAGNOSIS — E78.2 MIXED HYPERLIPIDEMIA: ICD-10-CM

## 2025-03-19 DIAGNOSIS — Z95.818 OTHER SPECIFIED POSTPROCEDURAL STATES: ICD-10-CM

## 2025-03-19 PROCEDURE — 93000 ELECTROCARDIOGRAM COMPLETE: CPT

## 2025-03-19 PROCEDURE — 93040 RHYTHM ECG WITH REPORT: CPT | Mod: 59

## 2025-03-19 PROCEDURE — 99407 BEHAV CHNG SMOKING > 10 MIN: CPT

## 2025-03-19 PROCEDURE — 99215 OFFICE O/P EST HI 40 MIN: CPT | Mod: 25

## 2025-03-19 PROCEDURE — G2212 PROLONG OUTPT/OFFICE VIS: CPT

## 2025-03-21 DIAGNOSIS — F11.90 OPIOID USE, UNSPECIFIED, UNCOMPLICATED: ICD-10-CM

## 2025-04-04 ENCOUNTER — APPOINTMENT (OUTPATIENT)
Dept: NEUROLOGY | Facility: CLINIC | Age: 70
End: 2025-04-04

## 2025-04-08 ENCOUNTER — RESULT REVIEW (OUTPATIENT)
Age: 70
End: 2025-04-08

## 2025-04-08 ENCOUNTER — APPOINTMENT (OUTPATIENT)
Dept: ULTRASOUND IMAGING | Facility: IMAGING CENTER | Age: 70
End: 2025-04-08
Payer: MEDICARE

## 2025-04-08 ENCOUNTER — OUTPATIENT (OUTPATIENT)
Dept: OUTPATIENT SERVICES | Facility: HOSPITAL | Age: 70
LOS: 1 days | End: 2025-04-08
Payer: MEDICARE

## 2025-04-08 DIAGNOSIS — Z94.5 SKIN TRANSPLANT STATUS: Chronic | ICD-10-CM

## 2025-04-08 DIAGNOSIS — C50.919 MALIGNANT NEOPLASM OF UNSPECIFIED SITE OF UNSPECIFIED FEMALE BREAST: ICD-10-CM

## 2025-04-08 DIAGNOSIS — Z90.89 ACQUIRED ABSENCE OF OTHER ORGANS: Chronic | ICD-10-CM

## 2025-04-08 PROCEDURE — 76642 ULTRASOUND BREAST LIMITED: CPT | Mod: 26,LT

## 2025-04-08 PROCEDURE — 76642 ULTRASOUND BREAST LIMITED: CPT

## 2025-04-08 NOTE — ASSESSMENT
IMPROVE-DD Application Not Available [Opioids] : Patient was explained in detail about pain control by using opioids. Patient has signed and fully understands our guidelines for medication and drug screening.  Patient understands the side effects of opioids, including, but not limited to, drug tolerance, dependence, potential for addiction. This class of drugs is habit-forming and ELIJAH regulated. The sedative effects of opioids can be potentiated by taking alcohol or any sleeping pills, along with opioids. The decision to drive is patient’s responsibility, as opioids can affect his/her driving ability and ability to concentrate. The long-term place is not clear, however, patient understands that once the pain control optimizes, the goal will be to wean off the opioids. All the issues regarding opioid treatment have been addressed satisfactorily.

## 2025-04-09 ENCOUNTER — OUTPATIENT (OUTPATIENT)
Dept: OUTPATIENT SERVICES | Facility: HOSPITAL | Age: 70
LOS: 1 days | Discharge: HOME | End: 2025-04-09

## 2025-04-09 DIAGNOSIS — Z90.89 ACQUIRED ABSENCE OF OTHER ORGANS: Chronic | ICD-10-CM

## 2025-04-09 DIAGNOSIS — Z94.5 SKIN TRANSPLANT STATUS: Chronic | ICD-10-CM

## 2025-04-09 DIAGNOSIS — D64.9 ANEMIA, UNSPECIFIED: ICD-10-CM

## 2025-04-10 ENCOUNTER — RESULT REVIEW (OUTPATIENT)
Age: 70
End: 2025-04-10

## 2025-04-10 ENCOUNTER — APPOINTMENT (OUTPATIENT)
Dept: HEMATOLOGY ONCOLOGY | Facility: CLINIC | Age: 70
End: 2025-04-10
Payer: MEDICARE

## 2025-04-10 DIAGNOSIS — C50.919 MALIGNANT NEOPLASM OF UNSPECIFIED SITE OF UNSPECIFIED FEMALE BREAST: ICD-10-CM

## 2025-04-10 LAB
BASOPHILS # BLD AUTO: 0.06 K/UL — SIGNIFICANT CHANGE UP (ref 0–0.2)
BASOPHILS NFR BLD AUTO: 0.9 % — SIGNIFICANT CHANGE UP (ref 0–2)
EOSINOPHIL # BLD AUTO: 0.3 K/UL — SIGNIFICANT CHANGE UP (ref 0–0.5)
EOSINOPHIL NFR BLD AUTO: 4.3 % — SIGNIFICANT CHANGE UP (ref 0–6)
HCT VFR BLD CALC: 39.1 % — SIGNIFICANT CHANGE UP (ref 34.5–45)
HGB BLD-MCNC: 12.6 G/DL — SIGNIFICANT CHANGE UP (ref 11.5–15.5)
IMM GRANULOCYTES NFR BLD AUTO: 0.3 % — SIGNIFICANT CHANGE UP (ref 0–0.9)
LYMPHOCYTES # BLD AUTO: 2.04 K/UL — SIGNIFICANT CHANGE UP (ref 1–3.3)
LYMPHOCYTES # BLD AUTO: 29.3 % — SIGNIFICANT CHANGE UP (ref 13–44)
MCHC RBC-ENTMCNC: 32.2 G/DL — SIGNIFICANT CHANGE UP (ref 32–36)
MCHC RBC-ENTMCNC: 32.6 PG — SIGNIFICANT CHANGE UP (ref 27–34)
MCV RBC AUTO: 101 FL — HIGH (ref 80–100)
MONOCYTES # BLD AUTO: 0.54 K/UL — SIGNIFICANT CHANGE UP (ref 0–0.9)
MONOCYTES NFR BLD AUTO: 7.7 % — SIGNIFICANT CHANGE UP (ref 2–14)
NEUTROPHILS # BLD AUTO: 4.01 K/UL — SIGNIFICANT CHANGE UP (ref 1.8–7.4)
NEUTROPHILS NFR BLD AUTO: 57.5 % — SIGNIFICANT CHANGE UP (ref 43–77)
NRBC BLD AUTO-RTO: 0 /100 WBCS — SIGNIFICANT CHANGE UP (ref 0–0)
PLATELET # BLD AUTO: 180 K/UL — SIGNIFICANT CHANGE UP (ref 150–400)
RBC # BLD: 3.87 M/UL — SIGNIFICANT CHANGE UP (ref 3.8–5.2)
RBC # FLD: 13.9 % — SIGNIFICANT CHANGE UP (ref 10.3–14.5)
WBC # BLD: 6.97 K/UL — SIGNIFICANT CHANGE UP (ref 3.8–10.5)
WBC # FLD AUTO: 6.97 K/UL — SIGNIFICANT CHANGE UP (ref 3.8–10.5)

## 2025-04-10 PROCEDURE — 99214 OFFICE O/P EST MOD 30 MIN: CPT

## 2025-04-15 LAB
25(OH)D3 SERPL-MCNC: 28.2 NG/ML
ALBUMIN SERPL ELPH-MCNC: 4.4 G/DL
ALP BLD-CCNC: 115 U/L
ALT SERPL-CCNC: 5 U/L
ANION GAP SERPL CALC-SCNC: 13 MMOL/L
AST SERPL-CCNC: 11 U/L
BILIRUB SERPL-MCNC: 0.2 MG/DL
BUN SERPL-MCNC: 9 MG/DL
CALCIUM SERPL-MCNC: 9.5 MG/DL
CHLORIDE SERPL-SCNC: 106 MMOL/L
CO2 SERPL-SCNC: 26 MMOL/L
CREAT SERPL-MCNC: 0.68 MG/DL
EGFRCR SERPLBLD CKD-EPI 2021: 95 ML/MIN/1.73M2
GLUCOSE SERPL-MCNC: 135 MG/DL
POTASSIUM SERPL-SCNC: 4.7 MMOL/L
PROT SERPL-MCNC: 7.3 G/DL
SODIUM SERPL-SCNC: 145 MMOL/L

## 2025-04-16 ENCOUNTER — APPOINTMENT (OUTPATIENT)
Dept: RHEUMATOLOGY | Facility: CLINIC | Age: 70
End: 2025-04-16
Payer: MEDICARE

## 2025-04-16 VITALS
SYSTOLIC BLOOD PRESSURE: 144 MMHG | TEMPERATURE: 97.6 F | RESPIRATION RATE: 16 BRPM | DIASTOLIC BLOOD PRESSURE: 86 MMHG | HEART RATE: 73 BPM | OXYGEN SATURATION: 96 %

## 2025-04-16 VITALS
DIASTOLIC BLOOD PRESSURE: 81 MMHG | OXYGEN SATURATION: 97 % | HEART RATE: 77 BPM | RESPIRATION RATE: 16 BRPM | SYSTOLIC BLOOD PRESSURE: 136 MMHG

## 2025-04-16 PROCEDURE — 96374 THER/PROPH/DIAG INJ IV PUSH: CPT

## 2025-05-20 ENCOUNTER — NON-APPOINTMENT (OUTPATIENT)
Age: 70
End: 2025-05-20

## 2025-05-20 VITALS — HEIGHT: 64 IN | BODY MASS INDEX: 17.42 KG/M2 | WEIGHT: 102 LBS

## 2025-05-20 DIAGNOSIS — F17.200 NICOTINE DEPENDENCE, UNSPECIFIED, UNCOMPLICATED: ICD-10-CM

## 2025-06-17 ENCOUNTER — APPOINTMENT (OUTPATIENT)
Dept: PAIN MANAGEMENT | Facility: CLINIC | Age: 70
End: 2025-06-17
Payer: MEDICARE

## 2025-06-17 VITALS
DIASTOLIC BLOOD PRESSURE: 89 MMHG | SYSTOLIC BLOOD PRESSURE: 160 MMHG | WEIGHT: 102 LBS | BODY MASS INDEX: 17.42 KG/M2 | HEART RATE: 98 BPM | HEIGHT: 64 IN

## 2025-06-17 PROCEDURE — 99213 OFFICE O/P EST LOW 20 MIN: CPT

## 2025-06-20 ENCOUNTER — APPOINTMENT (OUTPATIENT)
Dept: NEUROLOGY | Facility: CLINIC | Age: 70
End: 2025-06-20
Payer: MEDICARE

## 2025-06-20 VITALS
WEIGHT: 102 LBS | HEART RATE: 83 BPM | BODY MASS INDEX: 17.42 KG/M2 | HEIGHT: 64 IN | SYSTOLIC BLOOD PRESSURE: 163 MMHG | DIASTOLIC BLOOD PRESSURE: 89 MMHG

## 2025-06-20 PROCEDURE — G2211 COMPLEX E/M VISIT ADD ON: CPT

## 2025-06-20 PROCEDURE — 99214 OFFICE O/P EST MOD 30 MIN: CPT

## 2025-08-12 ENCOUNTER — APPOINTMENT (OUTPATIENT)
Dept: MAMMOGRAPHY | Facility: IMAGING CENTER | Age: 70
End: 2025-08-12

## 2025-08-12 ENCOUNTER — APPOINTMENT (OUTPATIENT)
Dept: ULTRASOUND IMAGING | Facility: IMAGING CENTER | Age: 70
End: 2025-08-12

## 2025-09-11 ENCOUNTER — APPOINTMENT (OUTPATIENT)
Dept: INTERNAL MEDICINE | Facility: CLINIC | Age: 70
End: 2025-09-11
Payer: MEDICARE

## 2025-09-11 VITALS
OXYGEN SATURATION: 98 % | HEART RATE: 87 BPM | WEIGHT: 99 LBS | HEIGHT: 64 IN | TEMPERATURE: 97.7 F | SYSTOLIC BLOOD PRESSURE: 153 MMHG | BODY MASS INDEX: 16.9 KG/M2 | DIASTOLIC BLOOD PRESSURE: 90 MMHG

## 2025-09-11 VITALS — SYSTOLIC BLOOD PRESSURE: 133 MMHG | DIASTOLIC BLOOD PRESSURE: 71 MMHG

## 2025-09-11 DIAGNOSIS — M81.0 AGE-RELATED OSTEOPOROSIS W/OUT CURRENT PATHOLOGICAL FRACTURE: ICD-10-CM

## 2025-09-11 DIAGNOSIS — E78.5 HYPERLIPIDEMIA, UNSPECIFIED: ICD-10-CM

## 2025-09-11 DIAGNOSIS — R56.9 UNSPECIFIED CONVULSIONS: ICD-10-CM

## 2025-09-11 DIAGNOSIS — G89.4 CHRONIC PAIN SYNDROME: ICD-10-CM

## 2025-09-11 DIAGNOSIS — C50.919 MALIGNANT NEOPLASM OF UNSPECIFIED SITE OF UNSPECIFIED FEMALE BREAST: ICD-10-CM

## 2025-09-11 DIAGNOSIS — I10 ESSENTIAL (PRIMARY) HYPERTENSION: ICD-10-CM

## 2025-09-11 DIAGNOSIS — E27.9 DISORDER OF ADRENAL GLAND, UNSPECIFIED: ICD-10-CM

## 2025-09-11 LAB — TSH SERPL-ACNC: 1.35 UIU/ML

## 2025-09-11 PROCEDURE — 99214 OFFICE O/P EST MOD 30 MIN: CPT

## 2025-09-11 PROCEDURE — G2211 COMPLEX E/M VISIT ADD ON: CPT

## 2025-09-11 PROCEDURE — 36415 COLL VENOUS BLD VENIPUNCTURE: CPT

## 2025-09-12 LAB
CHOLEST SERPL-MCNC: 154 MG/DL
ESTIMATED AVERAGE GLUCOSE: 100 MG/DL
HBA1C MFR BLD HPLC: 5.1 %
HDLC SERPL-MCNC: 58 MG/DL
LDLC SERPL-MCNC: 77 MG/DL
NONHDLC SERPL-MCNC: 96 MG/DL
TRIGL SERPL-MCNC: 105 MG/DL

## 2025-09-16 ENCOUNTER — APPOINTMENT (OUTPATIENT)
Dept: MAMMOGRAPHY | Facility: IMAGING CENTER | Age: 70
End: 2025-09-16

## 2025-09-16 ENCOUNTER — RESULT REVIEW (OUTPATIENT)
Age: 70
End: 2025-09-16

## 2025-09-16 ENCOUNTER — APPOINTMENT (OUTPATIENT)
Dept: ULTRASOUND IMAGING | Facility: IMAGING CENTER | Age: 70
End: 2025-09-16

## 2025-09-16 ENCOUNTER — APPOINTMENT (OUTPATIENT)
Dept: CT IMAGING | Facility: IMAGING CENTER | Age: 70
End: 2025-09-16
Payer: MEDICARE

## 2025-09-16 PROCEDURE — 76641 ULTRASOUND BREAST COMPLETE: CPT | Mod: 26,50,GA

## 2025-09-16 PROCEDURE — 77063 BREAST TOMOSYNTHESIS BI: CPT | Mod: 26

## 2025-09-16 PROCEDURE — 77067 SCR MAMMO BI INCL CAD: CPT | Mod: 26

## 2025-09-16 PROCEDURE — 71271 CT THORAX LUNG CANCER SCR C-: CPT | Mod: 26

## 2025-09-24 PROBLEM — R93.89 ABNORMAL CT SCAN, CHEST: Status: ACTIVE | Noted: 2025-09-24
